# Patient Record
Sex: MALE | Race: WHITE | Employment: FULL TIME | URBAN - METROPOLITAN AREA
[De-identification: names, ages, dates, MRNs, and addresses within clinical notes are randomized per-mention and may not be internally consistent; named-entity substitution may affect disease eponyms.]

---

## 2017-06-12 ENCOUNTER — HOSPITAL ENCOUNTER (OUTPATIENT)
Dept: GENERAL RADIOLOGY | Age: 57
Discharge: HOME | End: 2017-06-12
Attending: PHYSICIAN ASSISTANT | Admitting: PHYSICIAN ASSISTANT

## 2017-06-12 LAB
ALBUMIN SERPL-MCNC: 4 GM/DL (ref 3.4–5)
ALP BLD-CCNC: 58 IU/L (ref 40–129)
ALT SERPL-CCNC: 21 U/L (ref 10–40)
ANION GAP SERPL CALCULATED.3IONS-SCNC: 10 MMOL/L (ref 4–16)
AST SERPL-CCNC: 22 IU/L (ref 15–37)
BASOPHILS ABSOLUTE: 0 K/CU MM
BASOPHILS RELATIVE PERCENT: 0.4 % (ref 0–1)
BILIRUB SERPL-MCNC: 0.5 MG/DL (ref 0–1)
BUN BLDV-MCNC: 19 MG/DL (ref 6–23)
CALCIUM SERPL-MCNC: 8.7 MG/DL (ref 8.3–10.6)
CHLORIDE BLD-SCNC: 105 MMOL/L (ref 99–110)
CO2: 23 MMOL/L (ref 21–32)
CREAT SERPL-MCNC: 1.1 MG/DL (ref 0.9–1.3)
D DIMER: <200 NG/ML(DDU)
DIFFERENTIAL TYPE: ABNORMAL
EOSINOPHILS ABSOLUTE: 0.4 K/CU MM
EOSINOPHILS RELATIVE PERCENT: 5.6 % (ref 0–3)
GFR AFRICAN AMERICAN: >60 ML/MIN/1.73M2
GFR NON-AFRICAN AMERICAN: >60 ML/MIN/1.73M2
GLUCOSE BLD-MCNC: 84 MG/DL (ref 70–140)
HCT VFR BLD CALC: 37.5 % (ref 42–52)
HEMOGLOBIN: 12.5 GM/DL (ref 13.5–18)
IMMATURE NEUTROPHIL %: 0.5 % (ref 0–0.43)
LYMPHOCYTES ABSOLUTE: 2.2 K/CU MM
LYMPHOCYTES RELATIVE PERCENT: 28.8 % (ref 24–44)
MCH RBC QN AUTO: 29.8 PG (ref 27–31)
MCHC RBC AUTO-ENTMCNC: 33.3 % (ref 32–36)
MCV RBC AUTO: 89.5 FL (ref 78–100)
MONOCYTES ABSOLUTE: 0.6 K/CU MM
MONOCYTES RELATIVE PERCENT: 7.8 % (ref 0–4)
NUCLEATED RBC %: 0 %
PDW BLD-RTO: 12.6 % (ref 11.7–14.9)
PLATELET # BLD: 139 K/CU MM (ref 140–440)
PMV BLD AUTO: 11.5 FL (ref 7.5–11.1)
POTASSIUM SERPL-SCNC: 4.3 MMOL/L (ref 3.5–5.1)
RBC # BLD: 4.19 M/CU MM (ref 4.6–6.2)
SEGMENTED NEUTROPHILS ABSOLUTE COUNT: 4.4 K/CU MM
SEGMENTED NEUTROPHILS RELATIVE PERCENT: 56.9 % (ref 36–66)
SODIUM BLD-SCNC: 138 MMOL/L (ref 135–145)
TOTAL IMMATURE NEUTOROPHIL: 0.04 K/CU MM
TOTAL NUCLEATED RBC: 0 K/CU MM
TOTAL PROTEIN: 6.1 GM/DL (ref 6.4–8.2)
TSH HIGH SENSITIVITY: 2.09 UIU/ML (ref 0.27–4.2)
WBC # BLD: 7.7 K/CU MM (ref 4–10.5)

## 2017-06-20 ENCOUNTER — HOSPITAL ENCOUNTER (OUTPATIENT)
Dept: GENERAL RADIOLOGY | Age: 57
Discharge: HOME | End: 2017-06-20
Attending: PHYSICIAN ASSISTANT | Admitting: PHYSICIAN ASSISTANT

## 2017-06-20 DIAGNOSIS — R06.02 SHORTNESS OF BREATH: ICD-10-CM

## 2017-07-05 ENCOUNTER — HOSPITAL ENCOUNTER (OUTPATIENT)
Dept: GENERAL RADIOLOGY | Age: 57
Discharge: HOME | End: 2017-07-05
Attending: PHYSICIAN ASSISTANT | Admitting: PHYSICIAN ASSISTANT

## 2017-07-05 DIAGNOSIS — R06.02 SHORTNESS OF BREATH: ICD-10-CM

## 2017-07-20 ENCOUNTER — HOSPITAL ENCOUNTER (OUTPATIENT)
Dept: CARDIOLOGY | Age: 57
Discharge: HOME | End: 2017-07-20
Attending: PHYSICIAN ASSISTANT | Admitting: PHYSICIAN ASSISTANT

## 2017-07-20 DIAGNOSIS — R07.9 CHEST PAIN, UNSPECIFIED: ICD-10-CM

## 2017-07-20 DIAGNOSIS — R06.09 OTHER FORMS OF DYSPNEA: ICD-10-CM

## 2017-07-20 LAB
LV EF: 49 %
LVEF MODALITY: NORMAL

## 2017-07-20 RX ORDER — SODIUM CHLORIDE 0.9 % (FLUSH) 0.9 %
10 SYRINGE (ML) INJECTION PRN
Status: DISCONTINUED | OUTPATIENT
Start: 2017-07-20 | End: 2017-07-21 | Stop reason: HOSPADM

## 2017-07-20 RX ADMIN — Medication 10 ML: at 11:21

## 2017-07-20 RX ADMIN — Medication 30 MILLICURIE: at 12:04

## 2017-07-20 RX ADMIN — Medication 10 MILLICURIE: at 12:04

## 2017-08-04 ENCOUNTER — HOSPITAL ENCOUNTER (OUTPATIENT)
Dept: GENERAL RADIOLOGY | Age: 57
Discharge: HOME | End: 2017-08-04
Attending: INTERNAL MEDICINE | Admitting: INTERNAL MEDICINE

## 2017-08-04 LAB
ANION GAP SERPL CALCULATED.3IONS-SCNC: 12 MMOL/L (ref 4–16)
APTT: 26 SECONDS (ref 21.2–33)
BASOPHILS ABSOLUTE: 0 K/CU MM
BASOPHILS RELATIVE PERCENT: 0.5 % (ref 0–1)
BUN BLDV-MCNC: 20 MG/DL (ref 6–23)
CALCIUM SERPL-MCNC: 8.8 MG/DL (ref 8.3–10.6)
CHLORIDE BLD-SCNC: 105 MMOL/L (ref 99–110)
CO2: 24 MMOL/L (ref 21–32)
CREAT SERPL-MCNC: 1.1 MG/DL (ref 0.9–1.3)
DIFFERENTIAL TYPE: ABNORMAL
EOSINOPHILS ABSOLUTE: 0.4 K/CU MM
EOSINOPHILS RELATIVE PERCENT: 5.1 % (ref 0–3)
GFR AFRICAN AMERICAN: >60 ML/MIN/1.73M2
GFR NON-AFRICAN AMERICAN: >60 ML/MIN/1.73M2
GLUCOSE BLD-MCNC: 95 MG/DL (ref 70–140)
HCT VFR BLD CALC: 39.9 % (ref 42–52)
HEMOGLOBIN: 13.6 GM/DL (ref 13.5–18)
IMMATURE NEUTROPHIL %: 0.7 % (ref 0–0.43)
INR BLD: 0.95 INDEX
LYMPHOCYTES ABSOLUTE: 3.1 K/CU MM
LYMPHOCYTES RELATIVE PERCENT: 40.7 % (ref 24–44)
MCH RBC QN AUTO: 30.4 PG (ref 27–31)
MCHC RBC AUTO-ENTMCNC: 34.1 % (ref 32–36)
MCV RBC AUTO: 89.1 FL (ref 78–100)
MONOCYTES ABSOLUTE: 0.6 K/CU MM
MONOCYTES RELATIVE PERCENT: 8.4 % (ref 0–4)
NUCLEATED RBC %: 0 %
PDW BLD-RTO: 11.8 % (ref 11.7–14.9)
PLATELET # BLD: 157 K/CU MM (ref 140–440)
PMV BLD AUTO: 12.6 FL (ref 7.5–11.1)
POTASSIUM SERPL-SCNC: 4.6 MMOL/L (ref 3.5–5.1)
PROTHROMBIN TIME: 10.8 SECONDS (ref 9.12–12.5)
RBC # BLD: 4.48 M/CU MM (ref 4.6–6.2)
SEGMENTED NEUTROPHILS ABSOLUTE COUNT: 3.4 K/CU MM
SEGMENTED NEUTROPHILS RELATIVE PERCENT: 44.6 % (ref 36–66)
SODIUM BLD-SCNC: 141 MMOL/L (ref 135–145)
TOTAL IMMATURE NEUTOROPHIL: 0.05 K/CU MM
TOTAL NUCLEATED RBC: 0 K/CU MM
WBC # BLD: 7.6 K/CU MM (ref 4–10.5)

## 2017-09-08 ENCOUNTER — HOSPITAL ENCOUNTER (OUTPATIENT)
Dept: SLEEP CENTER | Age: 57
Discharge: HOME | End: 2017-09-08
Attending: FAMILY MEDICINE | Admitting: FAMILY MEDICINE

## 2017-09-23 ENCOUNTER — HOSPITAL ENCOUNTER (OUTPATIENT)
Dept: SLEEP CENTER | Age: 57
Discharge: HOME | End: 2017-09-23
Attending: INTERNAL MEDICINE | Admitting: INTERNAL MEDICINE

## 2017-09-23 VITALS — WEIGHT: 273 LBS | HEIGHT: 74 IN | BODY MASS INDEX: 35.04 KG/M2

## 2017-09-23 RX ORDER — DOXYCYCLINE HYCLATE 100 MG/1
100 CAPSULE ORAL DAILY
COMMUNITY
End: 2019-08-30 | Stop reason: SDUPTHER

## 2017-09-23 RX ORDER — TADALAFIL 5 MG/1
5 TABLET ORAL PRN
COMMUNITY
End: 2019-08-30 | Stop reason: SDUPTHER

## 2017-09-23 RX ORDER — COLCHICINE 0.6 MG/1
0.6 TABLET ORAL DAILY PRN
Status: ON HOLD | COMMUNITY
End: 2019-12-16 | Stop reason: HOSPADM

## 2017-09-23 RX ORDER — NITROGLYCERIN 0.4 MG/1
0.4 TABLET SUBLINGUAL PRN
COMMUNITY
End: 2020-01-14

## 2017-10-13 ENCOUNTER — HOSPITAL ENCOUNTER (OUTPATIENT)
Dept: SLEEP CENTER | Age: 57
Discharge: HOME | End: 2017-10-13
Attending: INTERNAL MEDICINE | Admitting: INTERNAL MEDICINE

## 2017-10-13 VITALS — BODY MASS INDEX: 35.04 KG/M2 | WEIGHT: 273 LBS | HEIGHT: 74 IN

## 2017-10-13 ASSESSMENT — SLEEP AND FATIGUE QUESTIONNAIRES
HOW LIKELY ARE YOU TO NOD OFF OR FALL ASLEEP WHILE LYING DOWN TO REST IN THE AFTERNOON WHEN CIRCUMSTANCES PERMIT: 3
HOW LIKELY ARE YOU TO NOD OFF OR FALL ASLEEP WHILE SITTING QUIETLY AFTER LUNCH WITHOUT ALCOHOL: 2
HOW LIKELY ARE YOU TO NOD OFF OR FALL ASLEEP WHILE SITTING INACTIVE IN A PUBLIC PLACE: 2
NECK CIRCUMFERENCE (INCHES): 18
HOW LIKELY ARE YOU TO NOD OFF OR FALL ASLEEP IN A CAR, WHILE STOPPED FOR A FEW MINUTES IN TRAFFIC: 0
HOW LIKELY ARE YOU TO NOD OFF OR FALL ASLEEP WHILE SITTING AND TALKING TO SOMEONE: 0
HOW LIKELY ARE YOU TO NOD OFF OR FALL ASLEEP WHEN YOU ARE A PASSENGER IN A CAR FOR AN HOUR WITHOUT A BREAK: 1
HOW LIKELY ARE YOU TO NOD OFF OR FALL ASLEEP WHILE SITTING AND READING: 3
ESS TOTAL SCORE: 12
HOW LIKELY ARE YOU TO NOD OFF OR FALL ASLEEP WHILE WATCHING TV: 1

## 2017-10-20 NOTE — PROGRESS NOTES
10/13/2017  sleep study results for Carmen Stallings  1960 are finalized and available. Please see media tab.     Electronically signed by Brendan Craig on 10/20/2017 at 6:06 PM

## 2017-12-22 ENCOUNTER — HOSPITAL ENCOUNTER (OUTPATIENT)
Dept: SLEEP CENTER | Age: 57
Discharge: HOME | End: 2018-01-20
Attending: INTERNAL MEDICINE | Admitting: INTERNAL MEDICINE

## 2018-12-04 ENCOUNTER — HOSPITAL ENCOUNTER (OUTPATIENT)
Age: 58
Discharge: HOME | End: 2018-12-04
Payer: COMMERCIAL

## 2018-12-04 ENCOUNTER — HOSPITAL ENCOUNTER (OUTPATIENT)
Dept: GENERAL RADIOLOGY | Age: 58
Discharge: HOME | End: 2018-12-04
Payer: COMMERCIAL

## 2018-12-04 DIAGNOSIS — R06.02 SOB (SHORTNESS OF BREATH): ICD-10-CM

## 2018-12-04 PROCEDURE — 71046 X-RAY EXAM CHEST 2 VIEWS: CPT

## 2019-01-16 ENCOUNTER — HOSPITAL ENCOUNTER (OUTPATIENT)
Dept: PULMONOLOGY | Age: 59
Discharge: HOME | End: 2019-01-16
Payer: COMMERCIAL

## 2019-01-16 LAB
DLCO %PRED: 63 %
DLCO PRED: NORMAL ML/MIN/MMHG
DLCO/VA %PRED: NORMAL %
DLCO/VA PRED: NORMAL ML/MIN/MMHG
DLCO/VA: NORMAL ML/MIN/MMHG
DLCO: NORMAL ML/MIN/MMHG
EXPIRATORY TIME: NORMAL SEC
FEF 25-75% %PRED-PRE: NORMAL L/SEC
FEF 25-75% PRED: NORMAL L/SEC
FEF 25-75%-PRE: NORMAL L/SEC
FEV1 %PRED-PRE: 60 %
FEV1 PRED: NORMAL L
FEV1/FVC %PRED-PRE: NORMAL %
FEV1/FVC PRED: NORMAL %
FEV1/FVC: 73 %
FEV1: NORMAL L
FVC %PRED-PRE: NORMAL %
FVC PRED: NORMAL L
FVC: NORMAL L
GAW %PRED: NORMAL %
GAW PRED: NORMAL L/S/CMH2O
GAW: NORMAL L/S/CMH2O
IC %PRED: NORMAL %
IC PRED: NORMAL L
IC: NORMAL L
MVV %PRED-PRE: NORMAL %
MVV PRED: NORMAL L/MIN
MVV-PRE: NORMAL L/MIN
PEF %PRED-PRE: NORMAL L/SEC
PEF PRED: NORMAL L/SEC
PEF-PRE: NORMAL L/SEC
RAW %PRED: NORMAL %
RAW PRED: NORMAL CMH2O/L/S
RAW: NORMAL CMH2O/L/S
RV %PRED: NORMAL %
RV PRED: NORMAL L
RV: NORMAL L
SVC %PRED: NORMAL %
SVC PRED: NORMAL L
SVC: NORMAL L
TLC %PRED: 80 %
TLC PRED: NORMAL L
TLC: NORMAL L
VA %PRED: NORMAL %
VA PRED: NORMAL L
VA: NORMAL L
VTG %PRED: NORMAL %
VTG PRED: NORMAL L
VTG: NORMAL L

## 2019-01-16 PROCEDURE — 94726 PLETHYSMOGRAPHY LUNG VOLUMES: CPT

## 2019-01-16 PROCEDURE — 94729 DIFFUSING CAPACITY: CPT

## 2019-01-16 PROCEDURE — 94060 EVALUATION OF WHEEZING: CPT

## 2019-01-16 ASSESSMENT — PULMONARY FUNCTION TESTS
FEV1_PERCENT_PREDICTED_PRE: 60
FEV1/FVC: 73

## 2019-04-24 LAB
ANION GAP SERPL CALCULATED.3IONS-SCNC: 13 MMOL/L (ref 3–16)
BASOPHILS ABSOLUTE: 0 K/UL (ref 0–0.2)
BASOPHILS RELATIVE PERCENT: 0.4 %
BUN BLDV-MCNC: 22 MG/DL (ref 7–20)
CALCIUM SERPL-MCNC: 8.6 MG/DL (ref 8.3–10.6)
CHLORIDE BLD-SCNC: 102 MMOL/L (ref 99–110)
CO2: 24 MMOL/L (ref 21–32)
CREAT SERPL-MCNC: 1.1 MG/DL (ref 0.9–1.3)
EOSINOPHILS ABSOLUTE: 0.3 K/UL (ref 0–0.6)
EOSINOPHILS RELATIVE PERCENT: 4 %
GFR AFRICAN AMERICAN: >60
GFR NON-AFRICAN AMERICAN: >60
GLUCOSE BLD-MCNC: 97 MG/DL (ref 70–99)
HCT VFR BLD CALC: 39.5 % (ref 40.5–52.5)
HEMOGLOBIN: 13.5 G/DL (ref 13.5–17.5)
LYMPHOCYTES ABSOLUTE: 2.4 K/UL (ref 1–5.1)
LYMPHOCYTES RELATIVE PERCENT: 37 %
MCH RBC QN AUTO: 30.5 PG (ref 26–34)
MCHC RBC AUTO-ENTMCNC: 34.2 G/DL (ref 31–36)
MCV RBC AUTO: 89.2 FL (ref 80–100)
MONOCYTES ABSOLUTE: 0.5 K/UL (ref 0–1.3)
MONOCYTES RELATIVE PERCENT: 8.2 %
NEUTROPHILS ABSOLUTE: 3.3 K/UL (ref 1.7–7.7)
NEUTROPHILS RELATIVE PERCENT: 50.4 %
PDW BLD-RTO: 12.8 % (ref 12.4–15.4)
PLATELET # BLD: 160 K/UL (ref 135–450)
PMV BLD AUTO: 9.3 FL (ref 5–10.5)
POTASSIUM SERPL-SCNC: 4 MMOL/L (ref 3.5–5.1)
PROSTATE SPECIFIC ANTIGEN: 0.6 NG/ML (ref 0–4)
RBC # BLD: 4.42 M/UL (ref 4.2–5.9)
SODIUM BLD-SCNC: 139 MMOL/L (ref 136–145)
TSH SERPL DL<=0.05 MIU/L-ACNC: 2.62 UIU/ML (ref 0.27–4.2)
WBC # BLD: 6.5 K/UL (ref 4–11)

## 2019-04-27 LAB — TESTOSTERONE TOTAL: 144 NG/DL (ref 220–1000)

## 2019-06-02 ENCOUNTER — HOSPITAL ENCOUNTER (EMERGENCY)
Age: 59
Discharge: HOME | End: 2019-06-02
Payer: COMMERCIAL

## 2019-06-02 VITALS
HEART RATE: 65 BPM | DIASTOLIC BLOOD PRESSURE: 74 MMHG | TEMPERATURE: 98 F | WEIGHT: 280 LBS | BODY MASS INDEX: 35.94 KG/M2 | HEIGHT: 74 IN | RESPIRATION RATE: 17 BRPM | SYSTOLIC BLOOD PRESSURE: 127 MMHG | OXYGEN SATURATION: 99 %

## 2019-06-02 DIAGNOSIS — R10.9 ABDOMINAL PAIN, UNSPECIFIED ABDOMINAL LOCATION: ICD-10-CM

## 2019-06-02 DIAGNOSIS — R31.9 HEMATURIA, UNSPECIFIED TYPE: Primary | ICD-10-CM

## 2019-06-02 LAB
ANION GAP SERPL CALCULATED.3IONS-SCNC: 9 MMOL/L (ref 4–16)
APTT: 25.2 SECONDS (ref 21.2–33)
BACTERIA: NEGATIVE /HPF
BASOPHILS ABSOLUTE: 0 K/CU MM
BASOPHILS RELATIVE PERCENT: 0.5 % (ref 0–1)
BILIRUBIN URINE: NEGATIVE MG/DL
BLOOD, URINE: ABNORMAL
BUN BLDV-MCNC: 23 MG/DL (ref 6–23)
CALCIUM SERPL-MCNC: 8.9 MG/DL (ref 8.3–10.6)
CHLORIDE BLD-SCNC: 103 MMOL/L (ref 99–110)
CLARITY: CLEAR
CO2: 25 MMOL/L (ref 21–32)
COLOR: YELLOW
CREAT SERPL-MCNC: 1.1 MG/DL (ref 0.9–1.3)
DIFFERENTIAL TYPE: ABNORMAL
EOSINOPHILS ABSOLUTE: 0.3 K/CU MM
EOSINOPHILS RELATIVE PERCENT: 4.2 % (ref 0–3)
GFR AFRICAN AMERICAN: >60 ML/MIN/1.73M2
GFR NON-AFRICAN AMERICAN: >60 ML/MIN/1.73M2
GLUCOSE BLD-MCNC: 96 MG/DL (ref 70–99)
GLUCOSE, URINE: NEGATIVE MG/DL
HCT VFR BLD CALC: 40.9 % (ref 42–52)
HEMOGLOBIN: 13.2 GM/DL (ref 13.5–18)
HYALINE CASTS: 0 /LPF
IMMATURE NEUTROPHIL %: 0.9 % (ref 0–0.43)
INR BLD: 0.96 INDEX
KETONES, URINE: NEGATIVE MG/DL
LEUKOCYTE ESTERASE, URINE: NEGATIVE
LYMPHOCYTES ABSOLUTE: 2.6 K/CU MM
LYMPHOCYTES RELATIVE PERCENT: 39.8 % (ref 24–44)
MCH RBC QN AUTO: 29.1 PG (ref 27–31)
MCHC RBC AUTO-ENTMCNC: 32.3 % (ref 32–36)
MCV RBC AUTO: 90.3 FL (ref 78–100)
MONOCYTES ABSOLUTE: 0.6 K/CU MM
MONOCYTES RELATIVE PERCENT: 9.1 % (ref 0–4)
MUCUS: ABNORMAL HPF
NITRITE URINE, QUANTITATIVE: NEGATIVE
NUCLEATED RBC %: 0 %
PDW BLD-RTO: 12.1 % (ref 11.7–14.9)
PH, URINE: 5 (ref 5–8)
PLATELET # BLD: 155 K/CU MM (ref 140–440)
PMV BLD AUTO: 11.1 FL (ref 7.5–11.1)
POTASSIUM SERPL-SCNC: 4.2 MMOL/L (ref 3.5–5.1)
PROTEIN UA: NEGATIVE MG/DL
PROTHROMBIN TIME: 10.9 SECONDS (ref 9.12–12.5)
RBC # BLD: 4.53 M/CU MM (ref 4.6–6.2)
RBC URINE: 155 /HPF (ref 0–3)
SEGMENTED NEUTROPHILS ABSOLUTE COUNT: 2.9 K/CU MM
SEGMENTED NEUTROPHILS RELATIVE PERCENT: 45.5 % (ref 36–66)
SODIUM BLD-SCNC: 137 MMOL/L (ref 135–145)
SPECIFIC GRAVITY UA: 1.02 (ref 1–1.03)
SQUAMOUS EPITHELIAL: <1 /HPF
TOTAL IMMATURE NEUTOROPHIL: 0.06 K/CU MM
TOTAL NUCLEATED RBC: 0 K/CU MM
TRICHOMONAS: ABNORMAL /HPF
UROBILINOGEN, URINE: NORMAL MG/DL (ref 0.2–1)
WBC # BLD: 6.5 K/CU MM (ref 4–10.5)
WBC UA: ABNORMAL /HPF (ref 0–2)

## 2019-06-02 PROCEDURE — 99283 EMERGENCY DEPT VISIT LOW MDM: CPT

## 2019-06-02 PROCEDURE — 85610 PROTHROMBIN TIME: CPT

## 2019-06-02 PROCEDURE — 87086 URINE CULTURE/COLONY COUNT: CPT

## 2019-06-02 PROCEDURE — 80048 BASIC METABOLIC PNL TOTAL CA: CPT

## 2019-06-02 PROCEDURE — 81001 URINALYSIS AUTO W/SCOPE: CPT

## 2019-06-02 PROCEDURE — 85025 COMPLETE CBC W/AUTO DIFF WBC: CPT

## 2019-06-02 PROCEDURE — 85730 THROMBOPLASTIN TIME PARTIAL: CPT

## 2019-06-02 NOTE — ED TRIAGE NOTES
Pt reports having burning, pressure, urgency and bleeding upon urination. Reports these symptoms began today.

## 2019-06-03 NOTE — ED PROVIDER NOTES
Patient Identification  Melissa Zavala is a 66 y.o. male    Chief Complaint  Dysuria (Reports burning and bleeding upon urination)      HPI  (History provided by patient)  This is a 66 y.o. male who was brought in by self for chief complaint of dysuria and hematuria. Onset was this morning. Patient reports he's had intermittent episodes of right red blood in his urine as well as dysuria that occurs only when blood is in urine. Has episodes of normal urination in between these episodes. Reports he's had intermittent pain in his right lower abdomen for the last few days, mild. Does have a history of kidney stones. No urethral discharge, testicle pain, genital trauma, nausea or vomiting, fevers, bleeding from any other source noted. No recent antibiotics. States he had a similar problem a year ago and followed up with urology and they gave him \"medications\" and it cleared up. He does not remember what these were. He is currently on Plavix. REVIEW OF SYSTEMS    Constitutional:  Denies fever, chills  HENT:  Denies sore throat or ear pain   Eyes: Denies vision changes, eye pain  Cardiovascular:  Denies chest pain, syncope  Respiratory:  Denies shortness of breath, cough   GI:  Denies nausea, vomiting.  + RLQ pain. :  Denies discharge. + dysuria, hematuria  Musculoskeletal:  Denies back pain, joint pain  Skin:  Denies rash, pruritis  Neurologic:  Denies headache, focal weakness, or sensory changes     See HPI and nursing notes for additional information     I have reviewed the following nursing documentation:  Allergies: No Known Allergies    Past medical history:  has a past medical history of CAD (coronary artery disease), Heart disease, Hyperlipidemia, and Hypertension. Past surgical history:  has a past surgical history that includes back surgery; vascular surgery; and Coronary angioplasty with stent.     Home medications:   Prior to Admission medications    Medication Sig Start Date End Date Taking? Authorizing Provider   tadalafil (CIALIS) 5 MG tablet Take 5 mg by mouth daily    Historical Provider, MD   colchicine (COLCRYS) 0.6 MG tablet Take 0.6 mg by mouth daily    Historical Provider, MD   doxycycline hyclate (VIBRAMYCIN) 100 MG capsule Take 100 mg by mouth daily    Historical Provider, MD   nitroGLYCERIN (NITROSTAT) 0.4 MG SL tablet Place 0.4 mg under the tongue as needed for Chest pain up to max of 3 total doses. If no relief after 1 dose, call 911. Historical Provider, MD   aspirin 81 MG chewable tablet Take 1 tablet by mouth daily 8/9/17   Wilmer Cranker, MD   atorvastatin (LIPITOR) 20 MG tablet Take 1 tablet by mouth nightly 8/9/17   Wilmer Cranker, MD   clopidogrel (PLAVIX) 75 MG tablet Take 1 tablet by mouth daily 8/9/17   Wilmer Cranker, MD   HYDROcodone-acetaminophen (NORCO) 5-325 MG per tablet Take 1 tablet by mouth every 4 hours as needed for Pain . 12/18/16   Ric Gongora MD   dicyclomine (BENTYL) 10 MG capsule Take 1 capsule by mouth 4 times daily as needed 4/3/16   Daniel Enriquez MD   ondansetron [facility]) 4 MG tablet Take 1 tablet by mouth every 8 hours as needed for Nausea 4/3/16   Daniel Enriquez MD   ranitidine (ZANTAC) 150 MG tablet Take 150 mg by mouth 2 times daily. Historical Provider, MD   LISINOPRIL PO Take 20 mg by mouth daily     Historical Provider, MD   Tamsulosin HCl (FLOMAX PO) Take 0.4 mg by mouth daily     Historical Provider, MD   SIMVASTATIN PO Take 40 mg by mouth daily     Historical Provider, MD       Social history:  reports that he quit smoking about 2 years ago. His smoking use included cigarettes. He smoked 1.50 packs per day. He has never used smokeless tobacco. He reports that he drinks alcohol. He reports that he does not use drugs. Family history:  History reviewed. No pertinent family history.       Exam  /70   Pulse 58   Temp 97.9 °F (36.6 °C) (Oral)   Resp 18   Ht 6' 2\" (1.88 m)   Wt 280 lb (127 kg)   SpO2 98%   BMI 35.95 kg/m² NONE SEEN /HPF    Hyaline Casts, UA 0 /LPF   CBC Auto Differential   Result Value Ref Range    WBC 6.5 4.0 - 10.5 K/CU MM    RBC 4.53 (L) 4.6 - 6.2 M/CU MM    Hemoglobin 13.2 (L) 13.5 - 18.0 GM/DL    Hematocrit 40.9 (L) 42 - 52 %    MCV 90.3 78 - 100 FL    MCH 29.1 27 - 31 PG    MCHC 32.3 32.0 - 36.0 %    RDW 12.1 11.7 - 14.9 %    Platelets 340 377 - 052 K/CU MM    MPV 11.1 7.5 - 11.1 FL    Differential Type AUTOMATED DIFFERENTIAL     Segs Relative 45.5 36 - 66 %    Lymphocytes % 39.8 24 - 44 %    Monocytes % 9.1 (H) 0 - 4 %    Eosinophils % 4.2 (H) 0 - 3 %    Basophils % 0.5 0 - 1 %    Segs Absolute 2.9 K/CU MM    Lymphocytes # 2.6 K/CU MM    Monocytes # 0.6 K/CU MM    Eosinophils # 0.3 K/CU MM    Basophils # 0.0 K/CU MM    Nucleated RBC % 0.0 %    Total Nucleated RBC 0.0 K/CU MM    Total Immature Neutrophil 0.06 K/CU MM    Immature Neutrophil % 0.9 (H) 0 - 0.43 %   Basic Metabolic Panel   Result Value Ref Range    Sodium 137 135 - 145 MMOL/L    Potassium 4.2 3.5 - 5.1 MMOL/L    Chloride 103 99 - 110 mMol/L    CO2 25 21 - 32 MMOL/L    Anion Gap 9 4 - 16    BUN 23 6 - 23 MG/DL    CREATININE 1.1 0.9 - 1.3 MG/DL    Glucose 96 70 - 99 MG/DL    Calcium 8.9 8.3 - 10.6 MG/DL    GFR Non-African American >60 >60 mL/min/1.73m2    GFR African American >60 >60 mL/min/1.73m2   Protime/INR & PTT   Result Value Ref Range    Protime 10.9 9.12 - 12.5 SECONDS    INR 0.96 INDEX    aPTT 25.2 21.2 - 33.0 SECONDS         MDM  Patient presents for hematuria. He does have a small amount of right lower quadrant and flank tenderness. On physical exam he does not appear to have acute abdomen. His vital signs are unremarkable. Laboratory workup reveals no evidence of leukocytosis. He has a mild chronic unchanged anemia. INR and PTT are normal.  Renal function is normal.  UA shows hematuria without evidence of infection. Unclear source of symptoms, clinically he does not appear to have appendicitis.   He may have a small kidney stone he is passing that he appears in no distress currently. I have recommended he follow up with urology for evaluation of hematuria given that this is second ER visit in the past year and a half for this. Discussed that he may benefit from cystoscopy to rule out bladder carcinoma or vascular malformation. Recommended going to urology office tomorrow morning at 8 AM for follow-up appointment. If unable to follow up then and then make appointment wall at office. I estimate there is LOW risk for ACUTE APPENDICITIS, BOWEL OBSTRUCTION, CHOLECYSTITIS, DIVERTICULITIS, INCARCERATED HERNIA, PANCREATITIS, MESENTERIC ISCHEMIA, ABDOMINAL AORTIC ANEURYSM/DISSECTION, PERFORATED BOWEL, and PERFORATED ULCER, thus I consider the discharge disposition reasonable. Melissa Zvaala and I have discussed the diagnosis and risks, and we agree with discharging home with PCP and urology follow-up. We also discussed returning to the Emergency Department immediately if new or worsening symptoms occur. We have discussed the symptoms which are most concerning (e.g., bloody stool, fever, changing or worsening pain, intractable vomiting, chest pain) that necessitate immediate return. I have independently evaluated this patient. Final Impression  1. Hematuria, unspecified type    2. Abdominal pain, unspecified abdominal location        Blood pressure 138/70, pulse 58, temperature 97.9 °F (36.6 °C), temperature source Oral, resp. rate 18, height 6' 2\" (1.88 m), weight 280 lb (127 kg), SpO2 98 %. Disposition:  Discharge to home in stable condition. Patient was given scripts for the following medications. I counseled patient how to take these medications. New Prescriptions    No medications on file       This chart was generated using the [address] [place] [address] [employer] system. I created this record but it may contain dictation errors given the limitations of this technology.        [address] [facility]  06/02/19 [id number]

## 2019-06-03 NOTE — ED NOTES
Discharge instructions reviewed. All questions answered to pt satisfaction. Pt alert, oriented, and ambulatory upon discharge.      Jocelyn Lee, TAZ  06/02/19 [id number]

## 2019-06-04 LAB
CULTURE: NORMAL
Lab: NORMAL
SPECIMEN: NORMAL

## 2019-06-05 RX ORDER — MONTELUKAST SODIUM 10 MG/1
TABLET ORAL
Qty: 30 TABLET | Refills: 2 | Status: SHIPPED | OUTPATIENT
Start: 2019-06-05 | End: 2019-08-30

## 2019-06-07 ENCOUNTER — APPOINTMENT (OUTPATIENT)
Dept: CT IMAGING | Age: 59
End: 2019-06-07
Payer: COMMERCIAL

## 2019-06-07 ENCOUNTER — HOSPITAL ENCOUNTER (EMERGENCY)
Age: 59
Discharge: HOME | End: 2019-06-07
Payer: COMMERCIAL

## 2019-06-07 VITALS
OXYGEN SATURATION: 96 % | HEIGHT: 74 IN | WEIGHT: 282 LBS | TEMPERATURE: 98 F | RESPIRATION RATE: 16 BRPM | HEART RATE: 60 BPM | DIASTOLIC BLOOD PRESSURE: 75 MMHG | SYSTOLIC BLOOD PRESSURE: 119 MMHG | BODY MASS INDEX: 36.19 KG/M2

## 2019-06-07 DIAGNOSIS — R10.9 FLANK PAIN: Primary | ICD-10-CM

## 2019-06-07 LAB
ALBUMIN SERPL-MCNC: 4 GM/DL (ref 3.4–5)
ALP BLD-CCNC: 73 IU/L (ref 40–129)
ALT SERPL-CCNC: 24 U/L (ref 10–40)
ANION GAP SERPL CALCULATED.3IONS-SCNC: 10 MMOL/L (ref 4–16)
AST SERPL-CCNC: 25 IU/L (ref 15–37)
BACTERIA: NEGATIVE /HPF
BASOPHILS ABSOLUTE: 0 K/CU MM
BASOPHILS RELATIVE PERCENT: 0.5 % (ref 0–1)
BILIRUB SERPL-MCNC: 0.5 MG/DL (ref 0–1)
BILIRUBIN URINE: NEGATIVE MG/DL
BLOOD, URINE: NEGATIVE
BUN BLDV-MCNC: 19 MG/DL (ref 6–23)
CALCIUM SERPL-MCNC: 8.6 MG/DL (ref 8.3–10.6)
CHLORIDE BLD-SCNC: 102 MMOL/L (ref 99–110)
CLARITY: CLEAR
CO2: 25 MMOL/L (ref 21–32)
COLOR: YELLOW
CREAT SERPL-MCNC: 1.3 MG/DL (ref 0.9–1.3)
DIFFERENTIAL TYPE: ABNORMAL
EOSINOPHILS ABSOLUTE: 0.2 K/CU MM
EOSINOPHILS RELATIVE PERCENT: 3.5 % (ref 0–3)
GFR AFRICAN AMERICAN: >60 ML/MIN/1.73M2
GFR NON-AFRICAN AMERICAN: 57 ML/MIN/1.73M2
GLUCOSE BLD-MCNC: 135 MG/DL (ref 70–99)
GLUCOSE, URINE: NEGATIVE MG/DL
HCT VFR BLD CALC: 41.2 % (ref 42–52)
HEMOGLOBIN: 13.2 GM/DL (ref 13.5–18)
IMMATURE NEUTROPHIL %: 0.8 % (ref 0–0.43)
KETONES, URINE: NEGATIVE MG/DL
LEUKOCYTE ESTERASE, URINE: NEGATIVE
LIPASE: 34 IU/L (ref 13–60)
LYMPHOCYTES ABSOLUTE: 2.4 K/CU MM
LYMPHOCYTES RELATIVE PERCENT: 37.1 % (ref 24–44)
MCH RBC QN AUTO: 29.2 PG (ref 27–31)
MCHC RBC AUTO-ENTMCNC: 32 % (ref 32–36)
MCV RBC AUTO: 91.2 FL (ref 78–100)
MONOCYTES ABSOLUTE: 0.6 K/CU MM
MONOCYTES RELATIVE PERCENT: 8.7 % (ref 0–4)
MUCUS: ABNORMAL HPF
NITRITE URINE, QUANTITATIVE: NEGATIVE
NUCLEATED RBC %: 0 %
PDW BLD-RTO: 11.9 % (ref 11.7–14.9)
PH, URINE: 5 (ref 5–8)
PLATELET # BLD: 156 K/CU MM (ref 140–440)
PMV BLD AUTO: 10.7 FL (ref 7.5–11.1)
POTASSIUM SERPL-SCNC: 4 MMOL/L (ref 3.5–5.1)
PROTEIN UA: NEGATIVE MG/DL
RBC # BLD: 4.52 M/CU MM (ref 4.6–6.2)
RBC URINE: ABNORMAL /HPF (ref 0–3)
SEGMENTED NEUTROPHILS ABSOLUTE COUNT: 3.2 K/CU MM
SEGMENTED NEUTROPHILS RELATIVE PERCENT: 49.4 % (ref 36–66)
SODIUM BLD-SCNC: 137 MMOL/L (ref 135–145)
SPECIFIC GRAVITY UA: 1.02 (ref 1–1.03)
SQUAMOUS EPITHELIAL: <1 /HPF
TOTAL IMMATURE NEUTOROPHIL: 0.05 K/CU MM
TOTAL NUCLEATED RBC: 0 K/CU MM
TOTAL PROTEIN: 6.7 GM/DL (ref 6.4–8.2)
TRICHOMONAS: ABNORMAL /HPF
UROBILINOGEN, URINE: NORMAL MG/DL (ref 0.2–1)
WBC # BLD: 6.5 K/CU MM (ref 4–10.5)
WBC UA: <1 /HPF (ref 0–2)

## 2019-06-07 PROCEDURE — 81001 URINALYSIS AUTO W/SCOPE: CPT

## 2019-06-07 PROCEDURE — 74176 CT ABD & PELVIS W/O CONTRAST: CPT

## 2019-06-07 PROCEDURE — 80053 COMPREHEN METABOLIC PANEL: CPT

## 2019-06-07 PROCEDURE — 36415 COLL VENOUS BLD VENIPUNCTURE: CPT

## 2019-06-07 PROCEDURE — 85025 COMPLETE CBC W/AUTO DIFF WBC: CPT

## 2019-06-07 PROCEDURE — 6370000000 HC RX 637 (ALT 250 FOR IP): Performed by: PHYSICIAN ASSISTANT

## 2019-06-07 PROCEDURE — 99284 EMERGENCY DEPT VISIT MOD MDM: CPT

## 2019-06-07 PROCEDURE — 83690 ASSAY OF LIPASE: CPT

## 2019-06-07 RX ORDER — HYDROCODONE BITARTRATE AND ACETAMINOPHEN 5; 325 MG/1; MG/1
2 TABLET ORAL ONCE
Status: COMPLETED | OUTPATIENT
Start: 2019-06-07 | End: 2019-06-07

## 2019-06-07 RX ORDER — HYDROCODONE BITARTRATE AND ACETAMINOPHEN 5; 325 MG/1; MG/1
1 TABLET ORAL EVERY 6 HOURS PRN
Qty: 10 TABLET | Refills: 0 | Status: SHIPPED | OUTPATIENT
Start: 2019-06-07 | End: 2019-06-10

## 2019-06-07 RX ORDER — ONDANSETRON 4 MG/1
4 TABLET, ORALLY DISINTEGRATING ORAL EVERY 6 HOURS
Qty: 10 TABLET | Refills: 0 | Status: SHIPPED | OUTPATIENT
Start: 2019-06-07 | End: 2019-08-30

## 2019-06-07 RX ADMIN — HYDROCODONE BITARTRATE AND ACETAMINOPHEN 2 TABLET: 5; 325 TABLET ORAL at 21:14

## 2019-06-07 ASSESSMENT — PAIN SCALES - GENERAL
PAINLEVEL_OUTOF10: 8
PAINLEVEL_OUTOF10: 9

## 2019-06-07 ASSESSMENT — PAIN DESCRIPTION - DESCRIPTORS: DESCRIPTORS: DULL;ACHING

## 2019-06-07 ASSESSMENT — PAIN DESCRIPTION - PAIN TYPE: TYPE: ACUTE PAIN

## 2019-06-07 ASSESSMENT — PAIN DESCRIPTION - ORIENTATION: ORIENTATION: RIGHT

## 2019-06-07 ASSESSMENT — PAIN DESCRIPTION - LOCATION: LOCATION: ABDOMEN;FLANK

## 2019-06-08 NOTE — ED PROVIDER NOTES
eMERGENCY dEPARTMENT eNCOUnter      PCP: Camilo Harper MD    [facility]    Chief Complaint   Patient presents with    Flank Pain     right sided flank pain x 1 week; pt here on sunday due to blood in his urine; pt reports the urinary symptoms have resolved    Abdominal Pain     right lower back pain x 1 week; denies nausea or vomiting      Pt seen independently and was not evaluated personally by attending physician. HPI    Ja Sawant is a 66 y.o. male who presents with 1 week of right flank and back pain. Patient was seen on the second for dysuria hematuria. Did have blood in his urine but remaining workup was unremarkable. Patient does have a history of kidney stones and he did follow up with Dr. Jose Gamboa after this ED visit. Scheduled for a CT scan next week and a cystoscopy the following week. Patient reports that since previous ED visit he has had pain in his generalized lower abdomen and right flank and right back. He did not have any of the pain at previous ED visits. He does report that the dysuria hematuria has resolved. No fevers nausea or vomiting or other constitutional signs. No testicular symptoms.         REVIEW OF SYSTEMS    Constitutional:  Denies fever, chills, weight loss or weakness   HENT:  Denies sore throat or ear pain   Cardiovascular:  Denies chest pain, palpitations   Respiratory:  Denies cough or shortness of breath    GI:  See hpi  :  Denies any urinary symptoms   Musculoskeletal:  Right back pain,   Skin:  Denies rash  Neurologic:  Denies headache, focal weakness or sensory changes   Endocrine:  Denies polyuria or polydypsia   Lymphatic:  Denies swollen glands     All other review of systems are negative  See HPI and nursing notes for additional information     PAST MEDICAL AND SURGICAL HISTORY    Past Medical History:   Diagnosis Date    CAD (coronary artery disease)     Heart disease     Hyperlipidemia     Hypertension      Past Surgical History: Procedure Laterality Date    BACK SURGERY      CORONARY ANGIOPLASTY WITH STENT PLACEMENT      VASCULAR SURGERY         CURRENT MEDICATIONS    Current Outpatient Rx   Medication Sig Dispense Refill    montelukast (SINGULAIR) 10 MG tablet TAKE 1 TABLET BY MOUTH EVERY DAY 30 tablet 2    tadalafil (CIALIS) 5 MG tablet Take 5 mg by mouth daily      colchicine (COLCRYS) 0.6 MG tablet Take 0.6 mg by mouth daily      doxycycline hyclate (VIBRAMYCIN) 100 MG capsule Take 100 mg by mouth daily      nitroGLYCERIN (NITROSTAT) 0.4 MG SL tablet Place 0.4 mg under the tongue as needed for Chest pain up to max of 3 total doses. If no relief after 1 dose, call 911.  aspirin 81 MG chewable tablet Take 1 tablet by mouth daily 30 tablet 3    atorvastatin (LIPITOR) 20 MG tablet Take 1 tablet by mouth nightly 30 tablet 3    clopidogrel (PLAVIX) 75 MG tablet Take 1 tablet by mouth daily 30 tablet 3    HYDROcodone-acetaminophen (NORCO) 5-325 MG per tablet Take 1 tablet by mouth every 4 hours as needed for Pain . 10 tablet 0    dicyclomine (BENTYL) 10 MG capsule Take 1 capsule by mouth 4 times daily as needed 20 capsule 0    ondansetron (ZOFRAN) 4 MG tablet Take 1 tablet by mouth every 8 hours as needed for Nausea 10 tablet 0    ranitidine (ZANTAC) 150 MG tablet Take 150 mg by mouth 2 times daily.         LISINOPRIL PO Take 20 mg by mouth daily       Tamsulosin HCl (FLOMAX PO) Take 0.4 mg by mouth daily       SIMVASTATIN PO Take 40 mg by mouth daily          ALLERGIES    No Known Allergies    SOCIAL AND FAMILY HISTORY    Social History     Socioeconomic History    Marital status:      Spouse name: None    Number of children: None    Years of education: None    Highest education level: None   Occupational History    None   Social Needs    Financial resource strain: None    Food insecurity:     Worry: None     Inability: None    Transportation needs:     Medical: None     Non-medical: None   Tobacco Use  Smoking status: Former Smoker     Packs/day: 1.50     Types: Cigarettes     Last attempt to quit: 2017     Years since quittin.4    Smokeless tobacco: Never Used   Substance and Sexual Activity    Alcohol use: Yes     Comment: occ    Drug use: No    Sexual activity: None   Lifestyle    Physical activity:     Days per week: None     Minutes per session: None    Stress: None   Relationships    Social connections:     Talks on phone: None     Gets together: None     Attends Yazidi service: None     Active member of club or organization: None     Attends meetings of clubs or organizations: None     Relationship status: None    Intimate partner violence:     Fear of current or ex partner: None     Emotionally abused: None     Physically abused: None     Forced sexual activity: None   Other Topics Concern    None   Social History Narrative    None     History reviewed. No pertinent family history. PHYSICAL EXAM    VITAL SIGNS: /75   Pulse 60   Temp 98 °F (36.7 °C) (Oral)   Resp 16   Ht 6' 2\" (1.88 m)   Wt 282 lb (127.9 kg)   SpO2 96%   BMI 36.21 kg/m²    Constitutional:  Well developed, Well nourished  HENT:  Normocephalic, Atraumatic, PERRL. EOMI. Sclera clear. Conjunctiva normal, No discharge. Neck/Lymphatics: supple, no JVD, no swollen nodes  Cardiovascular:  Normal heart rate, Normal rhythm, No murmurs  Respiratory:  Nonlabored breathing. Normal breath sounds, No wheezing  Abdomen: Bowel sounds normal, Soft, No masses  Generalized lower abdominal tenderness without any focal or peritoneal findings  Right CVA tenderness, right flank tenderness to palpation  Musculoskeletal: No edema, No tenderness, No cyanosis  Integument:  Warm, Dry  Neurologic:  Alert & oriented , No focal deficits noted. Cranial nerves II through XII grossly intact. Finger to nose intact, rapid alternating movements intact.   Normal gross motor coordination & motor strength bilateral upper and lower extremities, lower extremity DTRs intact. Sensation intact.   Psychiatric:  Affect normal, Mood normal.       Labs:  Results for orders placed or performed during the hospital encounter of 06/07/19   CBC auto diff   Result Value Ref Range    WBC 6.5 4.0 - 10.5 K/CU MM    RBC 4.52 (L) 4.6 - 6.2 M/CU MM    Hemoglobin 13.2 (L) 13.5 - 18.0 GM/DL    Hematocrit 41.2 (L) 42 - 52 %    MCV 91.2 78 - 100 FL    MCH 29.2 27 - 31 PG    MCHC 32.0 32.0 - 36.0 %    RDW 11.9 11.7 - 14.9 %    Platelets 217 648 - 820 K/CU MM    MPV 10.7 7.5 - 11.1 FL    Differential Type AUTOMATED DIFFERENTIAL     Segs Relative 49.4 36 - 66 %    Lymphocytes % 37.1 24 - 44 %    Monocytes % 8.7 (H) 0 - 4 %    Eosinophils % 3.5 (H) 0 - 3 %    Basophils % 0.5 0 - 1 %    Segs Absolute 3.2 K/CU MM    Lymphocytes # 2.4 K/CU MM    Monocytes # 0.6 K/CU MM    Eosinophils # 0.2 K/CU MM    Basophils # 0.0 K/CU MM    Nucleated RBC % 0.0 %    Total Nucleated RBC 0.0 K/CU MM    Total Immature Neutrophil 0.05 K/CU MM    Immature Neutrophil % 0.8 (H) 0 - 0.43 %   CMP   Result Value Ref Range    Sodium 137 135 - 145 MMOL/L    Potassium 4.0 3.5 - 5.1 MMOL/L    Chloride 102 99 - 110 mMol/L    CO2 25 21 - 32 MMOL/L    BUN 19 6 - 23 MG/DL    CREATININE 1.3 0.9 - 1.3 MG/DL    Glucose 135 (H) 70 - 99 MG/DL    Calcium 8.6 8.3 - 10.6 MG/DL    Alb 4.0 3.4 - 5.0 GM/DL    Total Protein 6.7 6.4 - 8.2 GM/DL    Total Bilirubin 0.5 0.0 - 1.0 MG/DL    ALT 24 10 - 40 U/L    AST 25 15 - 37 IU/L    Alkaline Phosphatase 73 40 - 129 IU/L    GFR Non- 57 (L) >60 mL/min/1.73m2    GFR African American >60 >60 mL/min/1.73m2    Anion Gap 10 4 - 16   Urinalysis   Result Value Ref Range    Color, UA YELLOW YELLOW    Clarity, UA CLEAR CLEAR    Glucose, Urine NEGATIVE NEGATIVE MG/DL    Bilirubin Urine NEGATIVE NEGATIVE MG/DL    Ketones, Urine NEGATIVE NEGATIVE MG/DL    Specific Gravity, UA 1.016 1.001 - 1.035    Blood, Urine NEGATIVE NEGATIVE    pH, Urine 5.0 5.0 - 8.0    Protein, UA NEGATIVE NEGATIVE MG/DL    Urobilinogen, Urine NORMAL 0.2 - 1.0 MG/DL    Nitrite Urine, Quantitative NEGATIVE NEGATIVE    Leukocyte Esterase, Urine NEGATIVE NEGATIVE    RBC, UA NONE SEEN 0 - 3 /HPF    WBC, UA <1 0 - 2 /HPF    Bacteria, UA NEGATIVE NEGATIVE /HPF    Squam Epithel, UA <1 /HPF    Mucus, UA RARE (A) NEGATIVE HPF    Trichomonas, UA NONE SEEN NONE SEEN /HPF   Lipase   Result Value Ref Range    Lipase 34 13 - 60 IU/L       RADIOLOGY         CT ABDOMEN PELVIS WO CONTRAST Additional Contrast? None (Final result)   Result time 06/07/19 21:57:44   Final result by Andi Null MD (06/07/19 21:57:44)                Impression:    1. No nephrolithiasis or obstructive uropathy. 2. No other acute findings in the abdomen or pelvis. Narrative:    EXAMINATION:  CT OF THE ABDOMEN AND PELVIS WITHOUT CONTRAST 6/7/2019 9:31 pm    TECHNIQUE:  CT of the abdomen and pelvis was performed without the administration of  intravenous contrast. Multiplanar reformatted images are provided for review. Dose modulation, iterative reconstruction, and/or weight based adjustment of  the mA/kV was utilized to reduce the radiation dose to as low as reasonably  achievable. COMPARISON:  04/14/2018 CT    HISTORY:  ORDERING SYSTEM PROVIDED HISTORY: right flank and back pain  TECHNOLOGIST PROVIDED HISTORY:  Additional Contrast?->None  Ordering Physician Provided Reason for Exam: rt flank pain    FINDINGS:   System: The kidneys are normal in size.  There is no evidence of  nephrolithiasis or obstructive uropathy. The ureters taper normally.  The  bladder is normal.    Lower Chest:  The lung bases are clear.  The base of the heart is normal.    Organs: Gallbladder is absent.  The liver, biliary ducts, pancreas and spleen  are normal.  The adrenal are normal.    GI/Bowel:  The stomach, duodenum and small bowel are normal. The appendix is  absent.  The colon is normal.    Pelvis: Prostate is normal.    Peritoneum/Retroperitoneum: The aorta tapers normally.  No lymph node  enlargement. Bones/Soft Tissues: Posterior fusion L4-S1.  No evidence of complication. Additional degenerative changes are seen throughout the spine.  No acute  skeletal findings.  Remote displaced fractures of the right 9th through 11th  ribs are stable. ED COURSE & MEDICAL DECISION MAKING       Vital signs and nursing notes reviewed during ED course. Patient seen independently. Attending available throughout ed stay for consultation. All pertinent Lab data and radiographic results reviewed with patient at bedside. The patient and/or the family were informed of the results of any tests/labs/imaging, the treatment plan, and time was allotted to answer questions. Clinical  IMPRESSION    1. Flank pain      Pt presents as above. Has been following with urology for back and flank pain but new pain radiating into the abdomen. Has outpatient ct and cystoscopy scheduled and would like to obtain ct today. Given pain meds and on recheck feeling better. Discussed all results ith patient at this time and encouraged to call urology office to move up cystoscopy and appointment if possible now that ct complete. To return here for new or worsening symptoms. Pt verbalized understanding and agreement with the POC. Comment: Please note this report has been produced using speech recognition software and may contain errors related to that system including errors in grammar, punctuation, and spelling, as well as words and phrases that may be inappropriate. If there are any questions or concerns please feel free to contact the dictating provider for clarification.         Sakina Rubio PA-C  06/10/19 [id number]

## 2019-06-08 NOTE — ED NOTES
Patient discharged to home at this time. Discharge instructions and follow up care discussed, patient voices understanding.       Simuel Hamman, RN  06/07/19 [id number]

## 2019-06-17 RX ORDER — LORATADINE 10 MG/1
TABLET ORAL
Qty: 90 TABLET | Refills: 1 | Status: SHIPPED | OUTPATIENT
Start: 2019-06-17 | End: 2019-08-30 | Stop reason: SDUPTHER

## 2019-07-27 DIAGNOSIS — K21.9 GASTROESOPHAGEAL REFLUX DISEASE, ESOPHAGITIS PRESENCE NOT SPECIFIED: ICD-10-CM

## 2019-07-27 DIAGNOSIS — I25.10 CORONARY ARTERIOSCLEROSIS: ICD-10-CM

## 2019-07-27 DIAGNOSIS — N40.0 BENIGN PROSTATIC HYPERPLASIA, UNSPECIFIED WHETHER LOWER URINARY TRACT SYMPTOMS PRESENT: ICD-10-CM

## 2019-07-27 DIAGNOSIS — N52.9 IMPOTENCE: ICD-10-CM

## 2019-07-27 RX ORDER — TRIAMTERENE AND HYDROCHLOROTHIAZIDE 37.5; 25 MG/1; MG/1
1 TABLET ORAL DAILY
COMMUNITY
Start: 2019-04-21 | End: 2019-08-30 | Stop reason: SDUPTHER

## 2019-07-27 RX ORDER — ALLOPURINOL 100 MG/1
1 TABLET ORAL DAILY
COMMUNITY
Start: 2019-04-24 | End: 2019-08-30 | Stop reason: SDUPTHER

## 2019-07-27 RX ORDER — NAPROXEN 500 MG/1
1 TABLET ORAL 2 TIMES DAILY PRN
COMMUNITY
Start: 2019-04-24 | End: 2019-08-30 | Stop reason: SDUPTHER

## 2019-08-30 ENCOUNTER — OFFICE VISIT (OUTPATIENT)
Dept: FAMILY MEDICINE CLINIC | Age: 59
End: 2019-08-30
Payer: COMMERCIAL

## 2019-08-30 VITALS
HEIGHT: 71 IN | WEIGHT: 281 LBS | DIASTOLIC BLOOD PRESSURE: 86 MMHG | BODY MASS INDEX: 39.34 KG/M2 | SYSTOLIC BLOOD PRESSURE: 136 MMHG | HEART RATE: 84 BPM

## 2019-08-30 DIAGNOSIS — I25.10 CORONARY ARTERIOSCLEROSIS: Primary | ICD-10-CM

## 2019-08-30 DIAGNOSIS — E78.00 PURE HYPERCHOLESTEROLEMIA: ICD-10-CM

## 2019-08-30 DIAGNOSIS — I10 ESSENTIAL HYPERTENSION: ICD-10-CM

## 2019-08-30 DIAGNOSIS — N52.9 IMPOTENCE: ICD-10-CM

## 2019-08-30 PROBLEM — E78.5 HYPERLIPIDEMIA: Status: ACTIVE | Noted: 2019-08-30

## 2019-08-30 PROCEDURE — 99214 OFFICE O/P EST MOD 30 MIN: CPT | Performed by: FAMILY MEDICINE

## 2019-08-30 RX ORDER — DOXYCYCLINE HYCLATE 100 MG/1
100 CAPSULE ORAL DAILY
Qty: 90 CAPSULE | Refills: 1 | Status: SHIPPED | OUTPATIENT
Start: 2019-08-30 | End: 2020-02-26 | Stop reason: SDUPTHER

## 2019-08-30 RX ORDER — NAPROXEN 500 MG/1
500 TABLET ORAL 2 TIMES DAILY PRN
Qty: 180 TABLET | Refills: 1 | Status: ON HOLD | OUTPATIENT
Start: 2019-08-30 | End: 2019-12-16 | Stop reason: HOSPADM

## 2019-08-30 RX ORDER — TAMSULOSIN HYDROCHLORIDE 0.4 MG/1
0.4 CAPSULE ORAL DAILY
Qty: 90 CAPSULE | Refills: 1 | Status: SHIPPED | OUTPATIENT
Start: 2019-08-30 | End: 2020-03-12 | Stop reason: SDUPTHER

## 2019-08-30 RX ORDER — ALLOPURINOL 100 MG/1
100 TABLET ORAL DAILY
Qty: 90 TABLET | Refills: 1 | Status: ON HOLD | OUTPATIENT
Start: 2019-08-30 | End: 2020-01-24 | Stop reason: HOSPADM

## 2019-08-30 RX ORDER — NITROGLYCERIN 0.4 MG/1
0.4 TABLET SUBLINGUAL EVERY 5 MIN PRN
Qty: 25 TABLET | Refills: 3 | Status: ON HOLD | OUTPATIENT
Start: 2019-08-30 | End: 2020-01-24 | Stop reason: HOSPADM

## 2019-08-30 RX ORDER — TAMSULOSIN HYDROCHLORIDE 0.4 MG/1
1 CAPSULE ORAL DAILY
COMMUNITY
Start: 2019-06-17 | End: 2019-08-30 | Stop reason: SDUPTHER

## 2019-08-30 RX ORDER — LORATADINE 10 MG/1
TABLET ORAL
Qty: 90 TABLET | Refills: 1 | Status: SHIPPED | OUTPATIENT
Start: 2019-08-30 | End: 2020-03-12 | Stop reason: SDUPTHER

## 2019-08-30 RX ORDER — TRIAMTERENE AND HYDROCHLOROTHIAZIDE 37.5; 25 MG/1; MG/1
1 TABLET ORAL DAILY
Qty: 90 TABLET | Refills: 1 | Status: ON HOLD | OUTPATIENT
Start: 2019-08-30 | End: 2020-01-24 | Stop reason: HOSPADM

## 2019-08-30 RX ORDER — TADALAFIL 5 MG/1
5 TABLET ORAL DAILY
Qty: 90 TABLET | Refills: 1 | Status: ON HOLD | OUTPATIENT
Start: 2019-08-30 | End: 2020-01-24 | Stop reason: HOSPADM

## 2019-08-30 ASSESSMENT — ENCOUNTER SYMPTOMS
SORE THROAT: 0
SHORTNESS OF BREATH: 0
RHINORRHEA: 0
DIARRHEA: 0
CONSTIPATION: 0
ABDOMINAL PAIN: 0
SINUS PRESSURE: 0
CHEST TIGHTNESS: 0
COUGH: 0

## 2019-08-30 ASSESSMENT — PATIENT HEALTH QUESTIONNAIRE - PHQ9
SUM OF ALL RESPONSES TO PHQ9 QUESTIONS 1 & 2: 0
SUM OF ALL RESPONSES TO PHQ QUESTIONS 1-9: 0
1. LITTLE INTEREST OR PLEASURE IN DOING THINGS: 0
2. FEELING DOWN, DEPRESSED OR HOPELESS: 0
SUM OF ALL RESPONSES TO PHQ QUESTIONS 1-9: 0

## 2019-08-30 NOTE — PROGRESS NOTES
8/30/2019    Newton Nuñez    Chief Complaint   Patient presents with    Medication Refill     4 MONTH ROV.  Leg Swelling     SON IS CONCERNED WITH PATIENT'S LEGS SWELLING. THEY ARE WORSE WHEN HE COMES HOME FROM WORK. PATIENT C/O HAVING LEG CRAMPS AS WELL. HPI  Nahun Lorenzo is a 64 y.o. male who presents today with follow-up on multiple medical conditions. Patient denies orthostatic symptoms. He does not check home blood pressure numbers. His blood pressure is high today. He admits to eating salty snacks. We discussed nutrition improvements to include getting rid of potato chips and starting knots    Patient notes edema to his legs especially after working standing all day. He wears boots and socks but not supportive socks. States his legs are always smaller in the morning. He does not note varicose veins. Patient denies chest pain    REVIEW OF SYMPTOMS  Review of Systems   Constitutional: Negative for chills and fever. HENT: Negative for rhinorrhea, sinus pressure and sore throat. Respiratory: Negative for cough, chest tightness and shortness of breath. Gastrointestinal: Negative for abdominal pain, constipation and diarrhea. Genitourinary: Negative for dysuria and frequency. Musculoskeletal: Negative for myalgias.        PAST MEDICAL HISTORY  Past Medical History:   Diagnosis Date    BPH (benign prostatic hyperplasia) 7/27/2019    Coronary arteriosclerosis 7/27/2019    GERD (gastroesophageal reflux disease) 7/27/2019    Impotence 7/27/2019       FAMILY HISTORY  Family History   Problem Relation Age of Onset    Stroke Mother     Hypertension Mother     Coronary Art Dis Other 64        uncle       SOCIAL HISTORY  Social History     Socioeconomic History    Marital status:      Spouse name: Not on file    Number of children: Not on file    Years of education: Not on file    Highest education level: Not on file   Occupational History    Not on file   Social Needs MG tablet Take 1 tablet by mouth daily 90 tablet 1    doxycycline hyclate (VIBRAMYCIN) 100 MG capsule Take 1 capsule by mouth daily 90 capsule 1    naproxen (NAPROSYN) 500 MG tablet Take 1 tablet by mouth 2 times daily as needed for Pain 180 tablet 1    allopurinol (ZYLOPRIM) 100 MG tablet Take 1 tablet by mouth daily 90 tablet 1    nitroGLYCERIN (NITROSTAT) 0.4 MG SL tablet Place 1 tablet under the tongue every 5 minutes as needed for Chest pain up to max of 3 total doses. If no relief after 1 dose, call 911. 25 tablet 3    colchicine (COLCRYS) 0.6 MG tablet Take 0.6 mg by mouth daily as needed       nitroGLYCERIN (NITROSTAT) 0.4 MG SL tablet Place 0.4 mg under the tongue as needed for Chest pain up to max of 3 total doses. If no relief after 1 dose, call 911.  aspirin 81 MG chewable tablet Take 1 tablet by mouth daily 30 tablet 3    atorvastatin (LIPITOR) 20 MG tablet Take 1 tablet by mouth nightly 30 tablet 3    clopidogrel (PLAVIX) 75 MG tablet Take 1 tablet by mouth daily 30 tablet 3    ranitidine (ZANTAC) 150 MG tablet Take 150 mg by mouth 2 times daily. No current facility-administered medications for this visit. ALLERGIES  Allergies   Allergen Reactions    Chantix [Varenicline Tartrate] Other (See Comments)     depression    Lisinopril Other (See Comments)     cough    Testosterone Other (See Comments)     speeding    Wellbutrin [Bupropion]        PHYSICAL EXAM  /86 (Site: Left Upper Arm, Position: Sitting)   Pulse 84   Ht 5' 11\" (1.803 m)   Wt 281 lb (127.5 kg)   BMI 39.19 kg/m²     Physical Exam   Constitutional: He is oriented to person, place, and time. He appears well-developed. HENT:   Head: Normocephalic. Eyes: Conjunctivae and EOM are normal.   Neck: Neck supple. Cardiovascular: Normal rate, regular rhythm and normal heart sounds. Pulmonary/Chest: Effort normal and breath sounds normal.   Musculoskeletal: Normal range of motion.    Neurological: He is

## 2019-09-23 ENCOUNTER — TELEPHONE (OUTPATIENT)
Dept: FAMILY MEDICINE CLINIC | Age: 59
End: 2019-09-23

## 2019-09-23 RX ORDER — MONTELUKAST SODIUM 10 MG/1
10 TABLET ORAL NIGHTLY
Qty: 90 TABLET | Refills: 1 | Status: SHIPPED | OUTPATIENT
Start: 2019-09-23 | End: 2020-03-12 | Stop reason: SDUPTHER

## 2019-11-04 LAB
LV EF: 57 %
LVEF MODALITY: NORMAL

## 2019-12-06 PROBLEM — I71.20 THORACIC AORTIC ANEURYSM WITHOUT RUPTURE: Status: ACTIVE | Noted: 2019-12-06

## 2019-12-06 PROBLEM — J44.9 COPD (CHRONIC OBSTRUCTIVE PULMONARY DISEASE) (HCC): Status: ACTIVE | Noted: 2019-12-06

## 2019-12-06 PROBLEM — I35.9 AORTIC VALVE DISEASE: Status: ACTIVE | Noted: 2019-12-06

## 2019-12-09 ENCOUNTER — HOSPITAL ENCOUNTER (OUTPATIENT)
Age: 59
Discharge: HOME | End: 2019-12-09
Payer: COMMERCIAL

## 2019-12-09 LAB
ANION GAP SERPL CALCULATED.3IONS-SCNC: 11 MMOL/L (ref 4–16)
APTT: 26.3 SECONDS (ref 25.1–37.1)
BASOPHILS ABSOLUTE: 0 K/CU MM
BASOPHILS RELATIVE PERCENT: 0.4 % (ref 0–1)
BUN BLDV-MCNC: 19 MG/DL (ref 6–23)
CALCIUM SERPL-MCNC: 9 MG/DL (ref 8.3–10.6)
CHLORIDE BLD-SCNC: 105 MMOL/L (ref 99–110)
CO2: 24 MMOL/L (ref 21–32)
CREAT SERPL-MCNC: 1.1 MG/DL (ref 0.9–1.3)
DIFFERENTIAL TYPE: ABNORMAL
EOSINOPHILS ABSOLUTE: 0.3 K/CU MM
EOSINOPHILS RELATIVE PERCENT: 3.3 % (ref 0–3)
GFR AFRICAN AMERICAN: >60 ML/MIN/1.73M2
GFR NON-AFRICAN AMERICAN: >60 ML/MIN/1.73M2
GLUCOSE BLD-MCNC: 107 MG/DL (ref 70–99)
HCT VFR BLD CALC: 42.4 % (ref 42–52)
HEMOGLOBIN: 13.9 GM/DL (ref 13.5–18)
IMMATURE NEUTROPHIL %: 0.6 % (ref 0–0.43)
INR BLD: 0.92 INDEX
LYMPHOCYTES ABSOLUTE: 2.8 K/CU MM
LYMPHOCYTES RELATIVE PERCENT: 35.1 % (ref 24–44)
MCH RBC QN AUTO: 29.3 PG (ref 27–31)
MCHC RBC AUTO-ENTMCNC: 32.8 % (ref 32–36)
MCV RBC AUTO: 89.3 FL (ref 78–100)
MONOCYTES ABSOLUTE: 0.6 K/CU MM
MONOCYTES RELATIVE PERCENT: 7.9 % (ref 0–4)
NUCLEATED RBC %: 0 %
PDW BLD-RTO: 12 % (ref 11.7–14.9)
PLATELET # BLD: 166 K/CU MM (ref 140–440)
PMV BLD AUTO: 11 FL (ref 7.5–11.1)
POTASSIUM SERPL-SCNC: 3.7 MMOL/L (ref 3.5–5.1)
PROTHROMBIN TIME: 11.1 SECONDS (ref 11.7–14.5)
RBC # BLD: 4.75 M/CU MM (ref 4.6–6.2)
SEGMENTED NEUTROPHILS ABSOLUTE COUNT: 4.2 K/CU MM
SEGMENTED NEUTROPHILS RELATIVE PERCENT: 52.7 % (ref 36–66)
SODIUM BLD-SCNC: 140 MMOL/L (ref 135–145)
TOTAL IMMATURE NEUTOROPHIL: 0.05 K/CU MM
TOTAL NUCLEATED RBC: 0 K/CU MM
WBC # BLD: 8 K/CU MM (ref 4–10.5)

## 2019-12-09 PROCEDURE — 85610 PROTHROMBIN TIME: CPT

## 2019-12-09 PROCEDURE — 80048 BASIC METABOLIC PNL TOTAL CA: CPT

## 2019-12-09 PROCEDURE — 85730 THROMBOPLASTIN TIME PARTIAL: CPT

## 2019-12-09 PROCEDURE — 85025 COMPLETE CBC W/AUTO DIFF WBC: CPT

## 2019-12-09 PROCEDURE — 36415 COLL VENOUS BLD VENIPUNCTURE: CPT

## 2019-12-16 ENCOUNTER — HOSPITAL ENCOUNTER (OUTPATIENT)
Dept: CARDIAC CATH/INVASIVE PROCEDURES | Age: 59
Discharge: HOME | End: 2019-12-16
Attending: INTERNAL MEDICINE | Admitting: INTERNAL MEDICINE
Payer: COMMERCIAL

## 2019-12-16 ENCOUNTER — HOSPITAL ENCOUNTER (OUTPATIENT)
Dept: NON INVASIVE DIAGNOSTICS | Age: 59
Discharge: HOME | End: 2019-12-16
Payer: COMMERCIAL

## 2019-12-16 VITALS
OXYGEN SATURATION: 96 % | RESPIRATION RATE: 12 BRPM | SYSTOLIC BLOOD PRESSURE: 114 MMHG | BODY MASS INDEX: 35.29 KG/M2 | DIASTOLIC BLOOD PRESSURE: 81 MMHG | TEMPERATURE: 98.1 F | WEIGHT: 275 LBS | HEART RATE: 55 BPM | HEIGHT: 74 IN

## 2019-12-16 LAB
BASE EXCESS MIXED: ABNORMAL (ref 0–1.2)
CARBON MONOXIDE, BLOOD: 3 % (ref 0–5)
CO2 CONTENT: 26.7 MMOL/L (ref 19–24)
COMMENT: ABNORMAL
ESTIMATED AVERAGE GLUCOSE: 111 MG/DL
HBA1C MFR BLD: 5.5 % (ref 4.2–6.3)
HCO3 ARTERIAL: 25.4 MMOL/L (ref 18–23)
LV EF: 53 %
LVEF MODALITY: NORMAL
METHEMOGLOBIN ARTERIAL: 0.6 %
O2 SATURATION: 96.8 % (ref 96–97)
PCO2 ARTERIAL: 41 MMHG (ref 32–45)
PH BLOOD: 7.4 (ref 7.34–7.45)
PO2 ARTERIAL: 73 MMHG (ref 75–100)

## 2019-12-16 PROCEDURE — 93458 L HRT ARTERY/VENTRICLE ANGIO: CPT

## 2019-12-16 PROCEDURE — 93312 ECHO TRANSESOPHAGEAL: CPT

## 2019-12-16 PROCEDURE — C1894 INTRO/SHEATH, NON-LASER: HCPCS

## 2019-12-16 PROCEDURE — 82803 BLOOD GASES ANY COMBINATION: CPT

## 2019-12-16 PROCEDURE — 6360000004 HC RX CONTRAST MEDICATION

## 2019-12-16 PROCEDURE — 2709999900 HC NON-CHARGEABLE SUPPLY

## 2019-12-16 PROCEDURE — 7100000001 HC PACU RECOVERY - ADDTL 15 MIN

## 2019-12-16 PROCEDURE — 7100000000 HC PACU RECOVERY - FIRST 15 MIN

## 2019-12-16 PROCEDURE — 83036 HEMOGLOBIN GLYCOSYLATED A1C: CPT

## 2019-12-16 PROCEDURE — C1887 CATHETER, GUIDING: HCPCS

## 2019-12-16 PROCEDURE — C1769 GUIDE WIRE: HCPCS

## 2019-12-16 PROCEDURE — 6360000002 HC RX W HCPCS

## 2019-12-16 RX ORDER — ATROPINE SULFATE 0.4 MG/ML
0.5 AMPUL (ML) INJECTION
Status: DISCONTINUED | OUTPATIENT
Start: 2019-12-16 | End: 2019-12-16 | Stop reason: HOSPADM

## 2019-12-16 RX ORDER — SODIUM CHLORIDE 9 MG/ML
INJECTION, SOLUTION INTRAVENOUS CONTINUOUS
Status: DISCONTINUED | OUTPATIENT
Start: 2019-12-16 | End: 2019-12-16 | Stop reason: HOSPADM

## 2019-12-16 RX ORDER — DIPHENHYDRAMINE HCL 25 MG
25 TABLET ORAL ONCE
Status: DISCONTINUED | OUTPATIENT
Start: 2019-12-16 | End: 2019-12-16 | Stop reason: HOSPADM

## 2019-12-16 RX ORDER — SODIUM CHLORIDE 0.9 % (FLUSH) 0.9 %
10 SYRINGE (ML) INJECTION EVERY 12 HOURS SCHEDULED
Status: DISCONTINUED | OUTPATIENT
Start: 2019-12-16 | End: 2019-12-16 | Stop reason: HOSPADM

## 2019-12-16 RX ORDER — DIAZEPAM 5 MG/1
5 TABLET ORAL ONCE
Status: DISCONTINUED | OUTPATIENT
Start: 2019-12-16 | End: 2019-12-16 | Stop reason: HOSPADM

## 2019-12-16 RX ORDER — LOSARTAN POTASSIUM 50 MG/1
50 TABLET ORAL DAILY
Qty: 30 TABLET | Refills: 5 | Status: ON HOLD | OUTPATIENT
Start: 2019-12-16 | End: 2020-01-24 | Stop reason: HOSPADM

## 2019-12-16 RX ORDER — ACETAMINOPHEN 325 MG/1
650 TABLET ORAL EVERY 4 HOURS PRN
Status: DISCONTINUED | OUTPATIENT
Start: 2019-12-16 | End: 2019-12-16 | Stop reason: HOSPADM

## 2019-12-16 RX ORDER — MORPHINE SULFATE 2 MG/ML
1 INJECTION, SOLUTION INTRAMUSCULAR; INTRAVENOUS
Status: DISCONTINUED | OUTPATIENT
Start: 2019-12-16 | End: 2019-12-16 | Stop reason: HOSPADM

## 2019-12-16 RX ORDER — SODIUM CHLORIDE 0.9 % (FLUSH) 0.9 %
10 SYRINGE (ML) INJECTION PRN
Status: DISCONTINUED | OUTPATIENT
Start: 2019-12-16 | End: 2019-12-16 | Stop reason: HOSPADM

## 2020-01-06 RX ORDER — RANITIDINE 150 MG/1
TABLET ORAL
Qty: 180 TABLET | Refills: 0 | Status: SHIPPED | OUTPATIENT
Start: 2020-01-06 | End: 2020-03-12 | Stop reason: SDUPTHER

## 2020-01-10 ENCOUNTER — ANESTHESIA EVENT (OUTPATIENT)
Dept: OPERATING ROOM | Age: 60
DRG: 220 | End: 2020-01-10
Payer: COMMERCIAL

## 2020-01-10 NOTE — ANESTHESIA PRE PROCEDURE
Elizabeth Tatum MD   atorvastatin (LIPITOR) 20 MG tablet Take 1 tablet by mouth nightly 8/9/17   Elizabteh Tatum MD   clopidogrel (PLAVIX) 75 MG tablet Take 1 tablet by mouth daily 8/9/17   Elizabeth Tatum MD       Current medications:    Current Outpatient Medications   Medication Sig Dispense Refill    ranitidine (ZANTAC) 150 MG tablet TAKE 1 TABLET BY MOUTH TWICE A  tablet 0    losartan (COZAAR) 50 MG tablet Take 1 tablet by mouth daily 30 tablet 5    montelukast (SINGULAIR) 10 MG tablet Take 1 tablet by mouth nightly 90 tablet 1    triamterene-hydrochlorothiazide (MAXZIDE-25) 37.5-25 MG per tablet Take 1 tablet by mouth daily 90 tablet 1    tamsulosin (FLOMAX) 0.4 MG capsule Take 1 capsule by mouth daily 90 capsule 1    loratadine (CLARITIN) 10 MG tablet TAKE 1 TABLET BY MOUTH EVERY DAY 90 tablet 1    tadalafil (CIALIS) 5 MG tablet Take 1 tablet by mouth daily 90 tablet 1    doxycycline hyclate (VIBRAMYCIN) 100 MG capsule Take 1 capsule by mouth daily 90 capsule 1    allopurinol (ZYLOPRIM) 100 MG tablet Take 1 tablet by mouth daily 90 tablet 1    nitroGLYCERIN (NITROSTAT) 0.4 MG SL tablet Place 1 tablet under the tongue every 5 minutes as needed for Chest pain up to max of 3 total doses. If no relief after 1 dose, call 911. 25 tablet 3    nitroGLYCERIN (NITROSTAT) 0.4 MG SL tablet Place 0.4 mg under the tongue as needed for Chest pain up to max of 3 total doses. If no relief after 1 dose, call 911.  aspirin 81 MG chewable tablet Take 1 tablet by mouth daily 30 tablet 3    atorvastatin (LIPITOR) 20 MG tablet Take 1 tablet by mouth nightly 30 tablet 3    clopidogrel (PLAVIX) 75 MG tablet Take 1 tablet by mouth daily 30 tablet 3     No current facility-administered medications for this encounter. Allergies:     Allergies   Allergen Reactions    Chantix [Varenicline Tartrate] Other (See Comments)     depression    Lisinopril Other (See Comments)     cough    Testosterone Other (See around 15 mmHg to 20 mmHg present. 2.  Right coronary artery mid stents are widely patent. There is a  collateral circulation filling the OM-2 branch which is unchanged from  before. It fills large PD and PL branch. There is mild disease noted. 3.  Left main is patent. 4. LAD has mild disease noted. 5.  Circ has mid 99% stenosis present, which is also unchanged from before. 6.  OM-1 and OM-2 branches are filling. 7.  The patient has dilated aortic root aneurysm present. 8.  The patient has mid circ disease present, which is unchanged from before. RAY 2019  EF 50-55%   Left ventricular function is normal,   There is no evidence of thrombus in the Left Atrial Appendage. Bubble study was negative . Mild aortic regurgitation is noted. Mild mitral regurgitation is present. Dilation of the aortic root and the ascending aorta. CARDIOVASCULAR:  Normal apical impulse, regular rate and rhythm, 2/6 murmur RSB in sitting position    CP: NO   Exercise: NO       EK2020  Sinus bradycardia   Minimal voltage criteria for LVH, may be normal variant   Borderline ECG   When compared with ECG of 09-AUG-2017 07:06,   No significant change was found      Neuro/Psych:   (+) neuromuscular disease (s/p L4-5 carmelo, 2004):,             GI/Hepatic/Renal:   (+) hiatal hernia, GERD:, renal disease (BPH, slow stream):, morbid obesity (BMI: 35.9)         ROS comment: S/p marleen/appy. Endo/Other:              Pt had PAT visit. ROS comment: Hx genital warts     Fall with rib fracture of rib -2016 Abdominal:           Vascular:           ROS comment: Thoracic aneurysm  1.  Fusiform aneurysmal dilatation of the ascending thoracic aorta measures up to 4.5 x 5.2 cm. No prior imaging is available for comparison at the time of the dictation. These findings were discussed with Venus Haney  at 2019 10:36 AM EST.   2.  Mild fusiform aneurysmal dilatation of the descending thoracic aorta measures 2.6 x 3.1 cm.  3.  Small hiatal hernia. 4.  Calcifications of the right lobe of the thyroid gland, further evaluation with dedicated ultrasound of the thyroid gland is recommended. 5.  Coronary artery disease. Carotid US 1/14/2020  The right internal carotid artery demonstrates 0-50% stenosis .     The left internal carotid artery demonstrates 0-50% stenosis . Jennifer Mercedes Anesthesia Plan      general     ASA 3       Induction: intravenous. arterial line, BIS, central line, CVP, PA catheter and RAY  MIPS: Postoperative ventilation. Anesthetic plan and risks discussed with patient. Plan discussed with CRNA and attending.     Attending anesthesiologist reviewed and agrees with Pre Eval content              Puja Bond, FABIENNE - CNP   1/10/2020

## 2020-01-14 ENCOUNTER — HOSPITAL ENCOUNTER (OUTPATIENT)
Age: 60
Discharge: HOME | End: 2020-01-14
Payer: COMMERCIAL

## 2020-01-14 ENCOUNTER — HOSPITAL ENCOUNTER (OUTPATIENT)
Dept: GENERAL RADIOLOGY | Age: 60
Discharge: HOME | End: 2020-01-14
Payer: COMMERCIAL

## 2020-01-14 ENCOUNTER — HOSPITAL ENCOUNTER (OUTPATIENT)
Dept: PULMONOLOGY | Age: 60
Discharge: HOME | End: 2020-01-14
Payer: COMMERCIAL

## 2020-01-14 ENCOUNTER — HOSPITAL ENCOUNTER (OUTPATIENT)
Dept: ULTRASOUND IMAGING | Age: 60
Discharge: HOME | End: 2020-01-14
Payer: COMMERCIAL

## 2020-01-14 ENCOUNTER — HOSPITAL ENCOUNTER (OUTPATIENT)
Dept: PREADMISSION TESTING | Age: 60
Discharge: HOME | End: 2020-01-18
Payer: COMMERCIAL

## 2020-01-14 VITALS
BODY MASS INDEX: 35.83 KG/M2 | OXYGEN SATURATION: 98 % | TEMPERATURE: 97.6 F | SYSTOLIC BLOOD PRESSURE: 127 MMHG | HEIGHT: 74 IN | WEIGHT: 279.2 LBS | DIASTOLIC BLOOD PRESSURE: 89 MMHG | HEART RATE: 57 BPM | RESPIRATION RATE: 16 BRPM

## 2020-01-14 LAB
ANION GAP SERPL CALCULATED.3IONS-SCNC: 12 MMOL/L (ref 4–16)
ANISOCYTOSIS: ABNORMAL
APTT: 26.5 SECONDS (ref 25.1–37.1)
BACTERIA: ABNORMAL /HPF
BANDED NEUTROPHILS ABSOLUTE COUNT: 0.48 K/CU MM
BANDED NEUTROPHILS RELATIVE PERCENT: 6 % (ref 5–11)
BILIRUBIN URINE: NEGATIVE MG/DL
BLOOD, URINE: NEGATIVE
BUN BLDV-MCNC: 21 MG/DL (ref 6–23)
CALCIUM SERPL-MCNC: 9.2 MG/DL (ref 8.3–10.6)
CHLORIDE BLD-SCNC: 102 MMOL/L (ref 99–110)
CLARITY: CLEAR
CO2: 24 MMOL/L (ref 21–32)
COLOR: YELLOW
CREAT SERPL-MCNC: 1.1 MG/DL (ref 0.9–1.3)
DIFFERENTIAL TYPE: ABNORMAL
EKG ATRIAL RATE: 48 BPM
EKG DIAGNOSIS: NORMAL
EKG P AXIS: 55 DEGREES
EKG P-R INTERVAL: 148 MS
EKG Q-T INTERVAL: 450 MS
EKG QRS DURATION: 90 MS
EKG QTC CALCULATION (BAZETT): 402 MS
EKG R AXIS: -16 DEGREES
EKG T AXIS: 34 DEGREES
EKG VENTRICULAR RATE: 48 BPM
EOSINOPHILS ABSOLUTE: 0.2 K/CU MM
EOSINOPHILS RELATIVE PERCENT: 3 % (ref 0–3)
GFR AFRICAN AMERICAN: >60 ML/MIN/1.73M2
GFR NON-AFRICAN AMERICAN: >60 ML/MIN/1.73M2
GLUCOSE BLD-MCNC: 111 MG/DL (ref 70–99)
GLUCOSE, URINE: 50 MG/DL
HCT VFR BLD CALC: 44.1 % (ref 42–52)
HEMOGLOBIN: 14.3 GM/DL (ref 13.5–18)
INR BLD: 0.86 INDEX
KETONES, URINE: NEGATIVE MG/DL
LEUKOCYTE ESTERASE, URINE: NEGATIVE
LYMPHOCYTES ABSOLUTE: 3 K/CU MM
LYMPHOCYTES RELATIVE PERCENT: 38 % (ref 24–44)
MAGNESIUM: 1.9 MG/DL (ref 1.8–2.4)
MCH RBC QN AUTO: 28.9 PG (ref 27–31)
MCHC RBC AUTO-ENTMCNC: 32.4 % (ref 32–36)
MCV RBC AUTO: 89.3 FL (ref 78–100)
METAMYELOCYTES ABSOLUTE COUNT: 0.08 K/CU MM
METAMYELOCYTES PERCENT: 1 %
MONOCYTES ABSOLUTE: 0.8 K/CU MM
MONOCYTES RELATIVE PERCENT: 10 % (ref 0–4)
MUCUS: ABNORMAL HPF
NITRITE URINE, QUANTITATIVE: NEGATIVE
PDW BLD-RTO: 11.8 % (ref 11.7–14.9)
PH, URINE: 5 (ref 5–8)
PLATELET # BLD: 157 K/CU MM (ref 140–440)
PMV BLD AUTO: 11.4 FL (ref 7.5–11.1)
POTASSIUM SERPL-SCNC: 4.1 MMOL/L (ref 3.5–5.1)
PROTEIN UA: NEGATIVE MG/DL
PROTHROMBIN TIME: 10.4 SECONDS (ref 11.7–14.5)
RBC # BLD: 4.94 M/CU MM (ref 4.6–6.2)
RBC URINE: <1 /HPF (ref 0–3)
SEGMENTED NEUTROPHILS ABSOLUTE COUNT: 3.4 K/CU MM
SEGMENTED NEUTROPHILS RELATIVE PERCENT: 42 % (ref 36–66)
SODIUM BLD-SCNC: 138 MMOL/L (ref 135–145)
SPECIFIC GRAVITY UA: 1.02 (ref 1–1.03)
SQUAMOUS EPITHELIAL: <1 /HPF
TRICHOMONAS: ABNORMAL /HPF
UROBILINOGEN, URINE: NORMAL MG/DL (ref 0.2–1)
WBC # BLD: 8 K/CU MM (ref 4–10.5)
WBC # BLD: ABNORMAL 10*3/UL
WBC UA: 1 /HPF (ref 0–2)

## 2020-01-14 PROCEDURE — 86900 BLOOD TYPING SEROLOGIC ABO: CPT

## 2020-01-14 PROCEDURE — 71046 X-RAY EXAM CHEST 2 VIEWS: CPT

## 2020-01-14 PROCEDURE — 80048 BASIC METABOLIC PNL TOTAL CA: CPT

## 2020-01-14 PROCEDURE — P9016 RBC LEUKOCYTES REDUCED: HCPCS

## 2020-01-14 PROCEDURE — 85730 THROMBOPLASTIN TIME PARTIAL: CPT

## 2020-01-14 PROCEDURE — 85610 PROTHROMBIN TIME: CPT

## 2020-01-14 PROCEDURE — 93880 EXTRACRANIAL BILAT STUDY: CPT

## 2020-01-14 PROCEDURE — 85007 BL SMEAR W/DIFF WBC COUNT: CPT

## 2020-01-14 PROCEDURE — 87081 CULTURE SCREEN ONLY: CPT

## 2020-01-14 PROCEDURE — 81001 URINALYSIS AUTO W/SCOPE: CPT

## 2020-01-14 PROCEDURE — 85027 COMPLETE CBC AUTOMATED: CPT

## 2020-01-14 PROCEDURE — 86901 BLOOD TYPING SEROLOGIC RH(D): CPT

## 2020-01-14 PROCEDURE — 93005 ELECTROCARDIOGRAM TRACING: CPT | Performed by: SURGERY

## 2020-01-14 PROCEDURE — 93010 ELECTROCARDIOGRAM REPORT: CPT | Performed by: INTERNAL MEDICINE

## 2020-01-14 PROCEDURE — 86850 RBC ANTIBODY SCREEN: CPT

## 2020-01-14 PROCEDURE — 94010 BREATHING CAPACITY TEST: CPT

## 2020-01-14 PROCEDURE — 93971 EXTREMITY STUDY: CPT

## 2020-01-14 PROCEDURE — 86922 COMPATIBILITY TEST ANTIGLOB: CPT

## 2020-01-14 PROCEDURE — 83735 ASSAY OF MAGNESIUM: CPT

## 2020-01-14 RX ORDER — CEFAZOLIN SODIUM 2 G/100ML
2 INJECTION, SOLUTION INTRAVENOUS ONCE
Status: CANCELLED | OUTPATIENT
Start: 2020-01-20

## 2020-01-14 RX ORDER — SIMVASTATIN 40 MG
40 TABLET ORAL ONCE
Status: CANCELLED | OUTPATIENT
Start: 2020-01-20

## 2020-01-14 RX ORDER — CARVEDILOL 3.12 MG/1
3.12 TABLET ORAL ONCE
Status: CANCELLED | OUTPATIENT
Start: 2020-01-20

## 2020-01-14 RX ORDER — CHLORHEXIDINE GLUCONATE 0.12 MG/ML
30 RINSE ORAL ONCE
Status: CANCELLED | OUTPATIENT
Start: 2020-01-20

## 2020-01-14 ASSESSMENT — ENCOUNTER SYMPTOMS
SHORTNESS OF BREATH: 1
DYSPNEA ACTIVITY LEVEL: AFTER AMBULATING 1 FLIGHT OF STAIRS

## 2020-01-14 NOTE — PROGRESS NOTES
Cardiac Surgery Risk Factor Worksheet      STS Criteria  Possible Score Yes/No Score ICD 10 Notes   Emergency Case 6 No 0  Mag 1.9   Serum Creatinine      Ca 9.2   >1.2 0 No 0     >1.6 to <1.8mg/dl 1 No 0  Na 138   >.1.9 4 No 0  K 4.1   Acute/Chronic Renal Failure/Renal Insufficiency 0   No 0 GFR  Stage of CKD BUN 21  Creat 1.1   Left Ventricular Dysfunction(EF<40%) 3   No 0  EF 50-55%   Operative Mitral Valve Insufficiency 3   No 0  Mild MR   Reoperation 3 No 0     Age >71 and <74 years 1  No   0  Age 64   Age >75 years 2 No 0     Prior Vascular Surgery 2   Yes 2  PTCA 2010   COPD +History of Patient Use of Bronchodilators 2   Yes 2 Acuity of  COPD Moderately severe obstructive airways disease   COPD 0 Yes 0     Current Smoker of History of Smoking  0   Yes 0  Smoked 2 and 1/2 PPD x 41 years  Quit 2017   Anemia (Hemotocrit<0.34) 2   No 0  Hgb 14.3  Hct 44.1   ASA / Anticoagulants/ Bleeding Tendiencies / Antiplatelets 0   No 0  PT 10.4  INR 0.86   PTT 26.5  Plt 157   Operative Aortic Valve Stenosis 1 Yes 1  Moderate AI     Weight<143 lbs (  BMI<18.5) 1   No 0 Obesity with  BMI    Weight >200 lbs (BMI >30    0   Yes 0  Ht 6'2\"  Wt 126 kg  BMI 35.9   Diabetes Oral or Insulin Therapy 1   No 0 Type & Control A1c 5.5   Cerebrovascular Disease 1   No 0  0-50% bilateral carotids    Impaired Mobility 0 No 0     Walker/Cane/Wheelchair 0 No 0     Recent MI 0 No 0     Hypertension 0 Yes 0     Peripheral Vascular Disease 0 No 0     Lives Alone 0 No 0     Other (GI Auto Immune Hepatic etc) 0 No 0 Malnutrition &   Acuity    CHF & Type & Acuity GERD   TOTAL   5     Note The surgeon must be notified for all risk scores >7    Notified byLEFTY Gonzalez RN   1/14/2020

## 2020-01-14 NOTE — PROGRESS NOTES
his recovery period   He demonstrates a positive attitude and states he is willing to do the work of recovery   All questions answered to his satisfaction  Provided tour of [facility] waiting area and CVICU  And introduced to staff  Escorted to main entrance at 11:15

## 2020-01-15 LAB
CULTURE: NORMAL
Lab: NORMAL
SPECIMEN: NORMAL

## 2020-01-20 ENCOUNTER — HOSPITAL ENCOUNTER (INPATIENT)
Age: 60
LOS: 4 days | Discharge: HOME HEALTH | DRG: 220 | End: 2020-01-24
Attending: SURGERY | Admitting: SURGERY
Payer: COMMERCIAL

## 2020-01-20 ENCOUNTER — ANESTHESIA (OUTPATIENT)
Dept: OPERATING ROOM | Age: 60
DRG: 220 | End: 2020-01-20
Payer: COMMERCIAL

## 2020-01-20 ENCOUNTER — APPOINTMENT (OUTPATIENT)
Dept: GENERAL RADIOLOGY | Age: 60
DRG: 220 | End: 2020-01-20
Attending: SURGERY
Payer: COMMERCIAL

## 2020-01-20 VITALS
TEMPERATURE: 98.2 F | DIASTOLIC BLOOD PRESSURE: 67 MMHG | SYSTOLIC BLOOD PRESSURE: 99 MMHG | RESPIRATION RATE: 24 BRPM | OXYGEN SATURATION: 100 %

## 2020-01-20 PROBLEM — I71.21 ASCENDING AORTIC ANEURYSM (HCC): Status: ACTIVE | Noted: 2020-01-20

## 2020-01-20 LAB
ANION GAP SERPL CALCULATED.3IONS-SCNC: 14 MMOL/L (ref 4–16)
APTT: 27.7 SECONDS (ref 25.1–37.1)
BASE EXCESS MIXED: 0.6 (ref 0–1.2)
BASE EXCESS MIXED: 0.7 (ref 0–1.2)
BASE EXCESS MIXED: 0.7 (ref 0–1.2)
BASE EXCESS MIXED: 0.8 (ref 0–1.2)
BASE EXCESS MIXED: 1.2 (ref 0–1.2)
BASE EXCESS MIXED: 1.5 (ref 0–1.2)
BASE EXCESS MIXED: 1.8 (ref 0–1.2)
BASE EXCESS MIXED: 2.1 (ref 0–1.2)
BASE EXCESS MIXED: 2.2 (ref 0–1.2)
BASE EXCESS MIXED: 2.3 (ref 0–1.2)
BASE EXCESS MIXED: 2.6 (ref 0–1.2)
BASE EXCESS MIXED: 2.7 (ref 0–1.2)
BASE EXCESS MIXED: 3.1 (ref 0–1.2)
BASE EXCESS MIXED: 4.9 (ref 0–1.2)
BASE EXCESS MIXED: 6 (ref 0–1.2)
BASE EXCESS MIXED: 6.1 (ref 0–1.2)
BASE EXCESS: ABNORMAL (ref 0–3.3)
BUN BLDV-MCNC: 17 MG/DL (ref 6–23)
CALCIUM SERPL-MCNC: 8.1 MG/DL (ref 8.3–10.6)
CHLORIDE BLD-SCNC: 108 MMOL/L (ref 99–110)
CO2 CONTENT: 27.3 MMOL/L (ref 19–24)
CO2: 22 MMOL/L (ref 21–32)
CO2: 23 MMOL/L (ref 21–32)
CO2: 24 MMOL/L (ref 21–32)
CO2: 24 MMOL/L (ref 21–32)
CO2: 26 MMOL/L (ref 21–32)
CO2: 26 MMOL/L (ref 21–32)
CO2: 27 MMOL/L (ref 21–32)
CO2: 28 MMOL/L (ref 21–32)
CREAT SERPL-MCNC: 1.2 MG/DL (ref 0.9–1.3)
GFR AFRICAN AMERICAN: >60 ML/MIN/1.73M2
GFR AFRICAN AMERICAN: >60 ML/MIN/1.73M2
GFR NON-AFRICAN AMERICAN: 53 ML/MIN/1.73M2
GFR NON-AFRICAN AMERICAN: >60 ML/MIN/1.73M2
GLUCOSE BLD-MCNC: 103 MG/DL (ref 70–99)
GLUCOSE BLD-MCNC: 104 MG/DL (ref 70–99)
GLUCOSE BLD-MCNC: 108 MG/DL (ref 70–99)
GLUCOSE BLD-MCNC: 122 MG/DL (ref 70–99)
GLUCOSE BLD-MCNC: 124 MG/DL (ref 70–99)
GLUCOSE BLD-MCNC: 129 MG/DL (ref 70–99)
GLUCOSE BLD-MCNC: 132 MG/DL (ref 70–99)
GLUCOSE BLD-MCNC: 136 MG/DL (ref 70–99)
GLUCOSE BLD-MCNC: 138 MG/DL (ref 70–99)
GLUCOSE BLD-MCNC: 143 MG/DL (ref 70–99)
GLUCOSE BLD-MCNC: 147 MG/DL (ref 70–99)
GLUCOSE BLD-MCNC: 148 MG/DL (ref 70–99)
GLUCOSE BLD-MCNC: 150 MG/DL (ref 70–99)
GLUCOSE BLD-MCNC: 153 MG/DL (ref 70–99)
GLUCOSE BLD-MCNC: 171 MG/DL (ref 70–99)
GLUCOSE BLD-MCNC: 195 MG/DL (ref 70–99)
GLUCOSE BLD-MCNC: 81 MG/DL (ref 70–99)
HCO3 ARTERIAL: 20.6 MMOL/L (ref 18–23)
HCO3 ARTERIAL: 21.1 MMOL/L (ref 18–23)
HCO3 ARTERIAL: 21.2 MMOL/L (ref 18–23)
HCO3 ARTERIAL: 21.6 MMOL/L (ref 18–23)
HCO3 ARTERIAL: 22.1 MMOL/L (ref 18–23)
HCO3 ARTERIAL: 22.2 MMOL/L (ref 18–23)
HCO3 ARTERIAL: 23.1 MMOL/L (ref 18–23)
HCO3 ARTERIAL: 24.6 MMOL/L (ref 18–23)
HCO3 ARTERIAL: 24.6 MMOL/L (ref 18–23)
HCO3 ARTERIAL: 25 MMOL/L (ref 18–23)
HCO3 ARTERIAL: 25.1 MMOL/L (ref 18–23)
HCO3 ARTERIAL: 25.2 MMOL/L (ref 18–23)
HCO3 ARTERIAL: 26.1 MMOL/L (ref 18–23)
HCO3 ARTERIAL: 26.3 MMOL/L (ref 18–23)
HCO3 ARTERIAL: 26.6 MMOL/L (ref 18–23)
HCO3 ARTERIAL: 26.8 MMOL/L (ref 18–23)
HCT VFR BLD CALC: 24 % (ref 42–52)
HCT VFR BLD CALC: 25 % (ref 42–52)
HCT VFR BLD CALC: 26 % (ref 42–52)
HCT VFR BLD CALC: 27 % (ref 42–52)
HCT VFR BLD CALC: 28 % (ref 42–52)
HCT VFR BLD CALC: 28 % (ref 42–52)
HCT VFR BLD CALC: 30 % (ref 42–52)
HCT VFR BLD CALC: 32 % (ref 42–52)
HCT VFR BLD CALC: 32 % (ref 42–52)
HCT VFR BLD CALC: 33 % (ref 42–52)
HCT VFR BLD CALC: 33 % (ref 42–52)
HCT VFR BLD CALC: 35 % (ref 42–52)
HCT VFR BLD CALC: 35 % (ref 42–52)
HCT VFR BLD CALC: 37 % (ref 42–52)
HCT VFR BLD CALC: 38.3 % (ref 42–52)
HEMOGLOBIN: 10.2 GM/DL (ref 13.5–18)
HEMOGLOBIN: 10.7 GM/DL (ref 13.5–18)
HEMOGLOBIN: 11 GM/DL (ref 13.5–18)
HEMOGLOBIN: 11.3 GM/DL (ref 13.5–18)
HEMOGLOBIN: 11.4 GM/DL (ref 13.5–18)
HEMOGLOBIN: 11.8 GM/DL (ref 13.5–18)
HEMOGLOBIN: 11.8 GM/DL (ref 13.5–18)
HEMOGLOBIN: 12.3 GM/DL (ref 13.5–18)
HEMOGLOBIN: 12.6 GM/DL (ref 13.5–18)
HEMOGLOBIN: 8.2 GM/DL (ref 13.5–18)
HEMOGLOBIN: 8.6 GM/DL (ref 13.5–18)
HEMOGLOBIN: 8.9 GM/DL (ref 13.5–18)
HEMOGLOBIN: 9.3 GM/DL (ref 13.5–18)
HEMOGLOBIN: 9.4 GM/DL (ref 13.5–18)
HEMOGLOBIN: 9.6 GM/DL (ref 13.5–18)
INR BLD: 1.26 INDEX
MAGNESIUM: 2 MG/DL (ref 1.8–2.4)
MCH RBC QN AUTO: 29.4 PG (ref 27–31)
MCHC RBC AUTO-ENTMCNC: 32.1 % (ref 32–36)
MCV RBC AUTO: 91.4 FL (ref 78–100)
O2 SATURATION: 100 % (ref 96–97)
O2 SATURATION: 49.1 % (ref 96–97)
O2 SATURATION: 95 % (ref 96–97)
O2 SATURATION: 95.5 % (ref 96–97)
O2 SATURATION: 96 % (ref 96–97)
O2 SATURATION: 98.5 % (ref 96–97)
O2 SATURATION: 99.3 % (ref 96–97)
O2 SATURATION: 99.5 % (ref 96–97)
O2 SATURATION: 99.9 % (ref 96–97)
PCO2 ARTERIAL: 30.8 MMHG (ref 32–45)
PCO2 ARTERIAL: 32.4 MMHG (ref 32–45)
PCO2 ARTERIAL: 36.6 MMHG (ref 32–45)
PCO2 ARTERIAL: 38.3 MMHG (ref 32–45)
PCO2 ARTERIAL: 40 MMHG (ref 32–45)
PCO2 ARTERIAL: 40.2 MMHG (ref 32–45)
PCO2 ARTERIAL: 44.3 MMHG (ref 32–45)
PCO2 ARTERIAL: 44.6 MMHG (ref 32–45)
PCO2 ARTERIAL: 44.7 MMHG (ref 32–45)
PCO2 ARTERIAL: 46.3 MMHG (ref 32–45)
PCO2 ARTERIAL: 46.5 MMHG (ref 32–45)
PCO2 ARTERIAL: 46.9 MMHG (ref 32–45)
PCO2 ARTERIAL: 50.5 MMHG (ref 32–45)
PCO2 ARTERIAL: 50.8 MMHG (ref 32–45)
PCO2 ARTERIAL: 54.8 MMHG (ref 32–45)
PCO2 ARTERIAL: 55.6 MMHG (ref 32–45)
PDW BLD-RTO: 12.2 % (ref 11.7–14.9)
PH BLOOD: 7.25 (ref 7.34–7.45)
PH BLOOD: 7.26 (ref 7.34–7.45)
PH BLOOD: 7.26 (ref 7.34–7.45)
PH BLOOD: 7.27 (ref 7.34–7.45)
PH BLOOD: 7.29 (ref 7.34–7.45)
PH BLOOD: 7.3 (ref 7.34–7.45)
PH BLOOD: 7.34 (ref 7.34–7.45)
PH BLOOD: 7.35 (ref 7.34–7.45)
PH BLOOD: 7.36 (ref 7.34–7.45)
PH BLOOD: 7.36 (ref 7.34–7.45)
PH BLOOD: 7.37 (ref 7.34–7.45)
PH BLOOD: 7.39 (ref 7.34–7.45)
PH BLOOD: 7.42 (ref 7.34–7.45)
PH BLOOD: 7.43 (ref 7.34–7.45)
PH BLOOD: 7.45 (ref 7.34–7.45)
PH BLOOD: 7.45 (ref 7.34–7.45)
PLATELET # BLD: ABNORMAL K/CU MM (ref 140–440)
PMV BLD AUTO: 11.1 FL (ref 7.5–11.1)
PO2 ARTERIAL: 120.7 MMHG (ref 75–100)
PO2 ARTERIAL: 176.4 MMHG (ref 75–100)
PO2 ARTERIAL: 181.8 MMHG (ref 75–100)
PO2 ARTERIAL: 27.8 MMHG (ref 75–100)
PO2 ARTERIAL: 301.5 MMHG (ref 75–100)
PO2 ARTERIAL: 304.9 MMHG (ref 75–100)
PO2 ARTERIAL: 307.8 MMHG (ref 75–100)
PO2 ARTERIAL: 440.4 MMHG (ref 75–100)
PO2 ARTERIAL: 444 MMHG (ref 75–100)
PO2 ARTERIAL: 618.3 MMHG (ref 75–100)
PO2 ARTERIAL: 634.4 MMHG (ref 75–100)
PO2 ARTERIAL: 652 MMHG (ref 75–100)
PO2 ARTERIAL: 653.6 MMHG (ref 75–100)
PO2 ARTERIAL: 74 MMHG (ref 75–100)
PO2 ARTERIAL: 74.4 MMHG (ref 75–100)
PO2 ARTERIAL: 86.9 MMHG (ref 75–100)
POC ACT PLUS: 100 SEC
POC ACT PLUS: 106 SEC
POC ACT PLUS: 114 SEC
POC ACT PLUS: 407 SEC
POC ACT PLUS: 460 SEC
POC ACT PLUS: 463 SEC
POC ACT PLUS: 509 SEC
POC ACT PLUS: 540 SEC
POC CALCIUM: 1 MMOL/L (ref 1.12–1.32)
POC CALCIUM: 1.07 MMOL/L (ref 1.12–1.32)
POC CALCIUM: 1.08 MMOL/L (ref 1.12–1.32)
POC CALCIUM: 1.08 MMOL/L (ref 1.12–1.32)
POC CALCIUM: 1.16 MMOL/L (ref 1.12–1.32)
POC CALCIUM: 1.16 MMOL/L (ref 1.12–1.32)
POC CALCIUM: 1.17 MMOL/L (ref 1.12–1.32)
POC CALCIUM: 1.18 MMOL/L (ref 1.12–1.32)
POC CALCIUM: 1.2 MMOL/L (ref 1.12–1.32)
POC CALCIUM: 1.21 MMOL/L (ref 1.12–1.32)
POC CALCIUM: 1.24 MMOL/L (ref 1.12–1.32)
POC CALCIUM: 1.26 MMOL/L (ref 1.12–1.32)
POC CALCIUM: 1.27 MMOL/L (ref 1.12–1.32)
POC CALCIUM: 1.28 MMOL/L (ref 1.12–1.32)
POC CALCIUM: 1.34 MMOL/L (ref 1.12–1.32)
POC CALCIUM: 1.4 MMOL/L (ref 1.12–1.32)
POC CHLORIDE: 104 MMOL/L (ref 98–109)
POC CHLORIDE: 105 MMOL/L (ref 98–109)
POC CHLORIDE: 106 MMOL/L (ref 98–109)
POC CHLORIDE: 106 MMOL/L (ref 98–109)
POC CHLORIDE: 107 MMOL/L (ref 98–109)
POC CHLORIDE: 108 MMOL/L (ref 98–109)
POC CHLORIDE: 108 MMOL/L (ref 98–109)
POC CHLORIDE: 109 MMOL/L (ref 98–109)
POC CHLORIDE: 109 MMOL/L (ref 98–109)
POC CHLORIDE: 110 MMOL/L (ref 98–109)
POC CREATININE: 0.8 MG/DL (ref 0.9–1.3)
POC CREATININE: 0.8 MG/DL (ref 0.9–1.3)
POC CREATININE: 0.9 MG/DL (ref 0.9–1.3)
POC CREATININE: 1.1 MG/DL (ref 0.9–1.3)
POC CREATININE: 1.1 MG/DL (ref 0.9–1.3)
POC CREATININE: 1.2 MG/DL (ref 0.9–1.3)
POC CREATININE: 1.2 MG/DL (ref 0.9–1.3)
POC CREATININE: 1.3 MG/DL (ref 0.9–1.3)
POC CREATININE: 1.4 MG/DL (ref 0.9–1.3)
POC CREATININE: 3.7 MG/DL (ref 0.9–1.3)
POTASSIUM SERPL-SCNC: 3.6 MMOL/L (ref 3.5–4.5)
POTASSIUM SERPL-SCNC: 4 MMOL/L (ref 3.5–4.5)
POTASSIUM SERPL-SCNC: 4.2 MMOL/L (ref 3.5–4.5)
POTASSIUM SERPL-SCNC: 4.3 MMOL/L (ref 3.5–4.5)
POTASSIUM SERPL-SCNC: 4.3 MMOL/L (ref 3.5–4.5)
POTASSIUM SERPL-SCNC: 4.4 MMOL/L (ref 3.5–4.5)
POTASSIUM SERPL-SCNC: 4.5 MMOL/L (ref 3.5–4.5)
POTASSIUM SERPL-SCNC: 4.6 MMOL/L (ref 3.5–4.5)
POTASSIUM SERPL-SCNC: 4.7 MMOL/L (ref 3.5–4.5)
POTASSIUM SERPL-SCNC: 4.7 MMOL/L (ref 3.5–4.5)
POTASSIUM SERPL-SCNC: 4.7 MMOL/L (ref 3.5–5.1)
POTASSIUM SERPL-SCNC: 4.7 MMOL/L (ref 3.5–5.1)
PROTHROMBIN TIME: 15.3 SECONDS (ref 11.7–14.5)
RBC # BLD: 4.19 M/CU MM (ref 4.6–6.2)
SODIUM BLD-SCNC: 139 MMOL/L (ref 138–146)
SODIUM BLD-SCNC: 141 MMOL/L (ref 138–146)
SODIUM BLD-SCNC: 141 MMOL/L (ref 138–146)
SODIUM BLD-SCNC: 142 MMOL/L (ref 138–146)
SODIUM BLD-SCNC: 143 MMOL/L (ref 138–146)
SODIUM BLD-SCNC: 143 MMOL/L (ref 138–146)
SODIUM BLD-SCNC: 144 MMOL/L (ref 135–145)
SODIUM BLD-SCNC: 144 MMOL/L (ref 138–146)
SODIUM BLD-SCNC: 145 MMOL/L (ref 138–146)
SODIUM BLD-SCNC: 145 MMOL/L (ref 138–146)
SODIUM BLD-SCNC: 146 MMOL/L (ref 138–146)
SODIUM BLD-SCNC: 147 MMOL/L (ref 138–146)
SODIUM BLD-SCNC: 149 MMOL/L (ref 138–146)
SOURCE, BLOOD GAS: ABNORMAL
WBC # BLD: 17.9 K/CU MM (ref 4–10.5)

## 2020-01-20 PROCEDURE — 3600000018 HC SURGERY OHS ADDTL 15MIN: Performed by: SURGERY

## 2020-01-20 PROCEDURE — 6370000000 HC RX 637 (ALT 250 FOR IP): Performed by: SURGERY

## 2020-01-20 PROCEDURE — 6370000000 HC RX 637 (ALT 250 FOR IP)

## 2020-01-20 PROCEDURE — C1768 GRAFT, VASCULAR: HCPCS | Performed by: SURGERY

## 2020-01-20 PROCEDURE — 85730 THROMBOPLASTIN TIME PARTIAL: CPT

## 2020-01-20 PROCEDURE — 94640 AIRWAY INHALATION TREATMENT: CPT

## 2020-01-20 PROCEDURE — 6360000002 HC RX W HCPCS: Performed by: NURSE ANESTHETIST, CERTIFIED REGISTERED

## 2020-01-20 PROCEDURE — 2580000003 HC RX 258: Performed by: NURSE ANESTHETIST, CERTIFIED REGISTERED

## 2020-01-20 PROCEDURE — 6360000002 HC RX W HCPCS: Performed by: SURGERY

## 2020-01-20 PROCEDURE — 7100000000 HC PACU RECOVERY - FIRST 15 MIN

## 2020-01-20 PROCEDURE — 82803 BLOOD GASES ANY COMBINATION: CPT

## 2020-01-20 PROCEDURE — 84295 ASSAY OF SERUM SODIUM: CPT

## 2020-01-20 PROCEDURE — 82330 ASSAY OF CALCIUM: CPT

## 2020-01-20 PROCEDURE — C1751 CATH, INF, PER/CENT/MIDLINE: HCPCS | Performed by: SURGERY

## 2020-01-20 PROCEDURE — 2720000010 HC SURG SUPPLY STERILE: Performed by: SURGERY

## 2020-01-20 PROCEDURE — P9045 ALBUMIN (HUMAN), 5%, 250 ML: HCPCS | Performed by: SURGERY

## 2020-01-20 PROCEDURE — 2709999900 HC NON-CHARGEABLE SUPPLY: Performed by: SURGERY

## 2020-01-20 PROCEDURE — 2580000003 HC RX 258: Performed by: SURGERY

## 2020-01-20 PROCEDURE — 2500000003 HC RX 250 WO HCPCS: Performed by: SURGERY

## 2020-01-20 PROCEDURE — 02RX0JZ REPLACEMENT OF THORACIC AORTA, ASCENDING/ARCH WITH SYNTHETIC SUBSTITUTE, OPEN APPROACH: ICD-10-PCS | Performed by: SURGERY

## 2020-01-20 PROCEDURE — 6370000000 HC RX 637 (ALT 250 FOR IP): Performed by: NURSE ANESTHETIST, CERTIFIED REGISTERED

## 2020-01-20 PROCEDURE — 84132 ASSAY OF SERUM POTASSIUM: CPT

## 2020-01-20 PROCEDURE — L8670 VASCULAR GRAFT, SYNTHETIC: HCPCS | Performed by: SURGERY

## 2020-01-20 PROCEDURE — 2580000003 HC RX 258: Performed by: PHYSICIAN ASSISTANT

## 2020-01-20 PROCEDURE — 6370000000 HC RX 637 (ALT 250 FOR IP): Performed by: PHYSICIAN ASSISTANT

## 2020-01-20 PROCEDURE — 88305 TISSUE EXAM BY PATHOLOGIST: CPT

## 2020-01-20 PROCEDURE — 80048 BASIC METABOLIC PNL TOTAL CA: CPT

## 2020-01-20 PROCEDURE — 85018 HEMOGLOBIN: CPT

## 2020-01-20 PROCEDURE — 36620 INSERTION CATHETER ARTERY: CPT

## 2020-01-20 PROCEDURE — 2100000000 HC CCU R&B

## 2020-01-20 PROCEDURE — 3700000000 HC ANESTHESIA ATTENDED CARE: Performed by: SURGERY

## 2020-01-20 PROCEDURE — 83735 ASSAY OF MAGNESIUM: CPT

## 2020-01-20 PROCEDURE — 2500000003 HC RX 250 WO HCPCS: Performed by: NURSE ANESTHETIST, CERTIFIED REGISTERED

## 2020-01-20 PROCEDURE — 5A1221Z PERFORMANCE OF CARDIAC OUTPUT, CONTINUOUS: ICD-10-PCS | Performed by: SURGERY

## 2020-01-20 PROCEDURE — 02RF0JZ REPLACEMENT OF AORTIC VALVE WITH SYNTHETIC SUBSTITUTE, OPEN APPROACH: ICD-10-PCS | Performed by: SURGERY

## 2020-01-20 PROCEDURE — 3600000008 HC SURGERY OHS BASE: Performed by: SURGERY

## 2020-01-20 PROCEDURE — 85027 COMPLETE CBC AUTOMATED: CPT

## 2020-01-20 PROCEDURE — 82962 GLUCOSE BLOOD TEST: CPT

## 2020-01-20 PROCEDURE — C1729 CATH, DRAINAGE: HCPCS | Performed by: SURGERY

## 2020-01-20 PROCEDURE — 2780000006 HC MISC HEART VALVE: Performed by: SURGERY

## 2020-01-20 PROCEDURE — 85347 COAGULATION TIME ACTIVATED: CPT

## 2020-01-20 PROCEDURE — 85014 HEMATOCRIT: CPT

## 2020-01-20 PROCEDURE — 3700000001 HC ADD 15 MINUTES (ANESTHESIA): Performed by: SURGERY

## 2020-01-20 PROCEDURE — 2580000003 HC RX 258: Performed by: ANESTHESIOLOGY

## 2020-01-20 PROCEDURE — 94002 VENT MGMT INPAT INIT DAY: CPT

## 2020-01-20 PROCEDURE — P9045 ALBUMIN (HUMAN), 5%, 250 ML: HCPCS | Performed by: NURSE ANESTHETIST, CERTIFIED REGISTERED

## 2020-01-20 PROCEDURE — 71045 X-RAY EXAM CHEST 1 VIEW: CPT

## 2020-01-20 PROCEDURE — C1894 INTRO/SHEATH, NON-LASER: HCPCS | Performed by: SURGERY

## 2020-01-20 PROCEDURE — 36556 INSERT NON-TUNNEL CV CATH: CPT

## 2020-01-20 PROCEDURE — 6360000002 HC RX W HCPCS: Performed by: PHYSICIAN ASSISTANT

## 2020-01-20 PROCEDURE — 85610 PROTHROMBIN TIME: CPT

## 2020-01-20 DEVICE — IMPLANTABLE DEVICE: Type: IMPLANTABLE DEVICE | Site: HEART | Status: FUNCTIONAL

## 2020-01-20 DEVICE — FELT SURG W6XL6IN THK1.65MM PTFE FOR THE REP OF SEPT DEFCT: Type: IMPLANTABLE DEVICE | Site: HEART | Status: FUNCTIONAL

## 2020-01-20 DEVICE — VALVE AORT DIA27MM H18.5MM ORIFICE DIA24MM SUT RNG DIA32MM: Type: IMPLANTABLE DEVICE | Site: HEART | Status: FUNCTIONAL

## 2020-01-20 RX ORDER — PROMETHAZINE HYDROCHLORIDE 25 MG/ML
6.25 INJECTION, SOLUTION INTRAMUSCULAR; INTRAVENOUS ONCE
Status: COMPLETED | OUTPATIENT
Start: 2020-01-20 | End: 2020-01-21

## 2020-01-20 RX ORDER — IPRATROPIUM BROMIDE AND ALBUTEROL SULFATE 2.5; .5 MG/3ML; MG/3ML
1 SOLUTION RESPIRATORY (INHALATION)
Status: DISCONTINUED | OUTPATIENT
Start: 2020-01-20 | End: 2020-01-24 | Stop reason: HOSPADM

## 2020-01-20 RX ORDER — HYDROMORPHONE HCL 110MG/55ML
PATIENT CONTROLLED ANALGESIA SYRINGE INTRAVENOUS PRN
Status: DISCONTINUED | OUTPATIENT
Start: 2020-01-20 | End: 2020-01-20 | Stop reason: SDUPTHER

## 2020-01-20 RX ORDER — SODIUM PHOSPHATE, DIBASIC AND SODIUM PHOSPHATE, MONOBASIC 7; 19 G/133ML; G/133ML
1 ENEMA RECTAL DAILY PRN
Status: DISCONTINUED | OUTPATIENT
Start: 2020-01-20 | End: 2020-01-24 | Stop reason: HOSPADM

## 2020-01-20 RX ORDER — MONTELUKAST SODIUM 10 MG/1
10 TABLET ORAL NIGHTLY
Status: DISCONTINUED | OUTPATIENT
Start: 2020-01-20 | End: 2020-01-24 | Stop reason: HOSPADM

## 2020-01-20 RX ORDER — CHLORHEXIDINE GLUCONATE 0.12 MG/ML
30 RINSE ORAL ONCE
Status: COMPLETED | OUTPATIENT
Start: 2020-01-20 | End: 2020-01-20

## 2020-01-20 RX ORDER — CARVEDILOL 3.12 MG/1
3.12 TABLET ORAL 2 TIMES DAILY
Status: DISCONTINUED | OUTPATIENT
Start: 2020-01-20 | End: 2020-01-23 | Stop reason: DRUGHIGH

## 2020-01-20 RX ORDER — AMIODARONE HYDROCHLORIDE 200 MG/1
200 TABLET ORAL 3 TIMES DAILY
Status: DISPENSED | OUTPATIENT
Start: 2020-01-20 | End: 2020-01-23

## 2020-01-20 RX ORDER — ASPIRIN 81 MG/1
81 TABLET ORAL DAILY
Status: DISCONTINUED | OUTPATIENT
Start: 2020-01-20 | End: 2020-01-24 | Stop reason: HOSPADM

## 2020-01-20 RX ORDER — MAGNESIUM SULFATE IN WATER 40 MG/ML
2 INJECTION, SOLUTION INTRAVENOUS PRN
Status: DISCONTINUED | OUTPATIENT
Start: 2020-01-20 | End: 2020-01-24 | Stop reason: HOSPADM

## 2020-01-20 RX ORDER — ONDANSETRON 2 MG/ML
INJECTION INTRAMUSCULAR; INTRAVENOUS PRN
Status: DISCONTINUED | OUTPATIENT
Start: 2020-01-20 | End: 2020-01-20 | Stop reason: SDUPTHER

## 2020-01-20 RX ORDER — ACETAMINOPHEN 10 MG/ML
1000 INJECTION, SOLUTION INTRAVENOUS EVERY 6 HOURS
Status: COMPLETED | OUTPATIENT
Start: 2020-01-20 | End: 2020-01-21

## 2020-01-20 RX ORDER — SODIUM CHLORIDE 0.9 % (FLUSH) 0.9 %
10 SYRINGE (ML) INJECTION PRN
Status: DISCONTINUED | OUTPATIENT
Start: 2020-01-20 | End: 2020-01-24 | Stop reason: HOSPADM

## 2020-01-20 RX ORDER — ALBUMIN, HUMAN INJ 5% 5 %
12.5 SOLUTION INTRAVENOUS ONCE
Status: COMPLETED | OUTPATIENT
Start: 2020-01-20 | End: 2020-01-20

## 2020-01-20 RX ORDER — SODIUM CHLORIDE 450 MG/100ML
INJECTION, SOLUTION INTRAVENOUS CONTINUOUS
Status: DISCONTINUED | OUTPATIENT
Start: 2020-01-20 | End: 2020-01-23

## 2020-01-20 RX ORDER — DEXTROSE MONOHYDRATE 25 G/50ML
12.5 INJECTION, SOLUTION INTRAVENOUS PRN
Status: DISCONTINUED | OUTPATIENT
Start: 2020-01-20 | End: 2020-01-24 | Stop reason: HOSPADM

## 2020-01-20 RX ORDER — SODIUM CHLORIDE 0.9 % (FLUSH) 0.9 %
10 SYRINGE (ML) INJECTION EVERY 12 HOURS SCHEDULED
Status: DISCONTINUED | OUTPATIENT
Start: 2020-01-20 | End: 2020-01-24 | Stop reason: HOSPADM

## 2020-01-20 RX ORDER — NICOTINE POLACRILEX 4 MG
15 LOZENGE BUCCAL PRN
Status: DISCONTINUED | OUTPATIENT
Start: 2020-01-20 | End: 2020-01-24 | Stop reason: HOSPADM

## 2020-01-20 RX ORDER — SUCCINYLCHOLINE/SOD CL,ISO/PF 100 MG/5ML
SYRINGE (ML) INTRAVENOUS PRN
Status: DISCONTINUED | OUTPATIENT
Start: 2020-01-20 | End: 2020-01-20 | Stop reason: SDUPTHER

## 2020-01-20 RX ORDER — SODIUM CHLORIDE, SODIUM LACTATE, POTASSIUM CHLORIDE, CALCIUM CHLORIDE 600; 310; 30; 20 MG/100ML; MG/100ML; MG/100ML; MG/100ML
INJECTION, SOLUTION INTRAVENOUS CONTINUOUS
Status: DISCONTINUED | OUTPATIENT
Start: 2020-01-20 | End: 2020-01-20

## 2020-01-20 RX ORDER — PANTOPRAZOLE SODIUM 40 MG/1
40 TABLET, DELAYED RELEASE ORAL DAILY
Status: DISCONTINUED | OUTPATIENT
Start: 2020-01-20 | End: 2020-01-24 | Stop reason: HOSPADM

## 2020-01-20 RX ORDER — TAMSULOSIN HYDROCHLORIDE 0.4 MG/1
0.4 CAPSULE ORAL DAILY
Status: DISCONTINUED | OUTPATIENT
Start: 2020-01-20 | End: 2020-01-24 | Stop reason: HOSPADM

## 2020-01-20 RX ORDER — NITROGLYCERIN 20 MG/100ML
10 INJECTION INTRAVENOUS CONTINUOUS PRN
Status: DISCONTINUED | OUTPATIENT
Start: 2020-01-20 | End: 2020-01-24 | Stop reason: HOSPADM

## 2020-01-20 RX ORDER — PROPOFOL 10 MG/ML
INJECTION, EMULSION INTRAVENOUS PRN
Status: DISCONTINUED | OUTPATIENT
Start: 2020-01-20 | End: 2020-01-20 | Stop reason: SDUPTHER

## 2020-01-20 RX ORDER — FENTANYL CITRATE 50 UG/ML
50 INJECTION, SOLUTION INTRAMUSCULAR; INTRAVENOUS
Status: DISCONTINUED | OUTPATIENT
Start: 2020-01-20 | End: 2020-01-20

## 2020-01-20 RX ORDER — VECURONIUM BROMIDE 1 MG/ML
INJECTION, POWDER, LYOPHILIZED, FOR SOLUTION INTRAVENOUS PRN
Status: DISCONTINUED | OUTPATIENT
Start: 2020-01-20 | End: 2020-01-20 | Stop reason: SDUPTHER

## 2020-01-20 RX ORDER — ALBUMIN, HUMAN INJ 5% 5 %
SOLUTION INTRAVENOUS PRN
Status: DISCONTINUED | OUTPATIENT
Start: 2020-01-20 | End: 2020-01-20 | Stop reason: SDUPTHER

## 2020-01-20 RX ORDER — FENTANYL CITRATE 50 UG/ML
50 INJECTION, SOLUTION INTRAMUSCULAR; INTRAVENOUS
Status: DISPENSED | OUTPATIENT
Start: 2020-01-20 | End: 2020-01-21

## 2020-01-20 RX ORDER — MIDAZOLAM HYDROCHLORIDE 1 MG/ML
INJECTION INTRAMUSCULAR; INTRAVENOUS PRN
Status: DISCONTINUED | OUTPATIENT
Start: 2020-01-20 | End: 2020-01-20 | Stop reason: SDUPTHER

## 2020-01-20 RX ORDER — DIPHENHYDRAMINE HYDROCHLORIDE 50 MG/ML
INJECTION INTRAMUSCULAR; INTRAVENOUS PRN
Status: DISCONTINUED | OUTPATIENT
Start: 2020-01-20 | End: 2020-01-20 | Stop reason: SDUPTHER

## 2020-01-20 RX ORDER — METOPROLOL TARTRATE 5 MG/5ML
2.5 INJECTION INTRAVENOUS EVERY 10 MIN PRN
Status: DISCONTINUED | OUTPATIENT
Start: 2020-01-20 | End: 2020-01-24 | Stop reason: HOSPADM

## 2020-01-20 RX ORDER — HYDROMORPHONE HCL 110MG/55ML
PATIENT CONTROLLED ANALGESIA SYRINGE INTRAVENOUS PRN
Status: DISCONTINUED | OUTPATIENT
Start: 2020-01-20 | End: 2020-01-20

## 2020-01-20 RX ORDER — AMIODARONE HYDROCHLORIDE 200 MG/1
200 TABLET ORAL DAILY
Status: DISCONTINUED | OUTPATIENT
Start: 2020-01-24 | End: 2020-01-24 | Stop reason: HOSPADM

## 2020-01-20 RX ORDER — FUROSEMIDE 10 MG/ML
20 INJECTION INTRAMUSCULAR; INTRAVENOUS
Status: ACTIVE | OUTPATIENT
Start: 2020-01-20 | End: 2020-01-20

## 2020-01-20 RX ORDER — CEFAZOLIN SODIUM 2 G/100ML
2 INJECTION, SOLUTION INTRAVENOUS ONCE
Status: COMPLETED | OUTPATIENT
Start: 2020-01-20 | End: 2020-01-20

## 2020-01-20 RX ORDER — PHENYLEPHRINE HYDROCHLORIDE 10 MG/ML
INJECTION INTRAVENOUS PRN
Status: DISCONTINUED | OUTPATIENT
Start: 2020-01-20 | End: 2020-01-20 | Stop reason: SDUPTHER

## 2020-01-20 RX ORDER — BISACODYL 10 MG
10 SUPPOSITORY, RECTAL RECTAL DAILY PRN
Status: DISCONTINUED | OUTPATIENT
Start: 2020-01-20 | End: 2020-01-24 | Stop reason: HOSPADM

## 2020-01-20 RX ORDER — ACETAMINOPHEN 325 MG/1
650 TABLET ORAL EVERY 4 HOURS PRN
Status: DISCONTINUED | OUTPATIENT
Start: 2020-01-20 | End: 2020-01-24 | Stop reason: HOSPADM

## 2020-01-20 RX ORDER — HYDROCODONE BITARTRATE AND ACETAMINOPHEN 5; 325 MG/1; MG/1
2 TABLET ORAL EVERY 4 HOURS PRN
Status: DISCONTINUED | OUTPATIENT
Start: 2020-01-20 | End: 2020-01-24 | Stop reason: HOSPADM

## 2020-01-20 RX ORDER — FENTANYL CITRATE 50 UG/ML
25 INJECTION, SOLUTION INTRAMUSCULAR; INTRAVENOUS
Status: DISCONTINUED | OUTPATIENT
Start: 2020-01-20 | End: 2020-01-20

## 2020-01-20 RX ORDER — ALBUMIN, HUMAN INJ 5% 5 %
12.5 SOLUTION INTRAVENOUS ONCE
Status: DISCONTINUED | OUTPATIENT
Start: 2020-01-20 | End: 2020-01-20 | Stop reason: SDUPTHER

## 2020-01-20 RX ORDER — HYDROCODONE BITARTRATE AND ACETAMINOPHEN 5; 325 MG/1; MG/1
1 TABLET ORAL EVERY 4 HOURS PRN
Status: DISCONTINUED | OUTPATIENT
Start: 2020-01-20 | End: 2020-01-24 | Stop reason: HOSPADM

## 2020-01-20 RX ORDER — ATORVASTATIN CALCIUM 20 MG/1
20 TABLET, FILM COATED ORAL NIGHTLY
Status: DISCONTINUED | OUTPATIENT
Start: 2020-01-21 | End: 2020-01-24 | Stop reason: HOSPADM

## 2020-01-20 RX ORDER — M-VIT,TX,IRON,MINS/CALC/FOLIC 27MG-0.4MG
1 TABLET ORAL
Status: DISCONTINUED | OUTPATIENT
Start: 2020-01-21 | End: 2020-01-24 | Stop reason: HOSPADM

## 2020-01-20 RX ORDER — SUFENTANIL CITRATE 50 UG/ML
INJECTION EPIDURAL; INTRAVENOUS PRN
Status: DISCONTINUED | OUTPATIENT
Start: 2020-01-20 | End: 2020-01-20 | Stop reason: SDUPTHER

## 2020-01-20 RX ORDER — ALBUMIN, HUMAN INJ 5% 5 %
25 SOLUTION INTRAVENOUS PRN
Status: DISCONTINUED | OUTPATIENT
Start: 2020-01-20 | End: 2020-01-24 | Stop reason: HOSPADM

## 2020-01-20 RX ORDER — DEXAMETHASONE SODIUM PHOSPHATE 4 MG/ML
INJECTION, SOLUTION INTRA-ARTICULAR; INTRALESIONAL; INTRAMUSCULAR; INTRAVENOUS; SOFT TISSUE PRN
Status: DISCONTINUED | OUTPATIENT
Start: 2020-01-20 | End: 2020-01-20 | Stop reason: SDUPTHER

## 2020-01-20 RX ORDER — ONDANSETRON 2 MG/ML
4 INJECTION INTRAMUSCULAR; INTRAVENOUS EVERY 8 HOURS PRN
Status: DISCONTINUED | OUTPATIENT
Start: 2020-01-20 | End: 2020-01-24 | Stop reason: HOSPADM

## 2020-01-20 RX ORDER — CARVEDILOL 3.12 MG/1
3.12 TABLET ORAL ONCE
Status: COMPLETED | OUTPATIENT
Start: 2020-01-20 | End: 2020-01-20

## 2020-01-20 RX ORDER — POTASSIUM CHLORIDE 20MEQ/15ML
LIQUID (ML) ORAL PRN
Status: DISCONTINUED | OUTPATIENT
Start: 2020-01-20 | End: 2020-01-20 | Stop reason: SDUPTHER

## 2020-01-20 RX ORDER — SIMVASTATIN 40 MG
40 TABLET ORAL ONCE
Status: COMPLETED | OUTPATIENT
Start: 2020-01-20 | End: 2020-01-20

## 2020-01-20 RX ORDER — CALCIUM CHLORIDE 100 MG/ML
INJECTION INTRAVENOUS; INTRAVENTRICULAR PRN
Status: DISCONTINUED | OUTPATIENT
Start: 2020-01-20 | End: 2020-01-20 | Stop reason: SDUPTHER

## 2020-01-20 RX ORDER — HYDRALAZINE HYDROCHLORIDE 20 MG/ML
5 INJECTION INTRAMUSCULAR; INTRAVENOUS EVERY 5 MIN PRN
Status: DISCONTINUED | OUTPATIENT
Start: 2020-01-20 | End: 2020-01-24 | Stop reason: HOSPADM

## 2020-01-20 RX ORDER — PROCHLORPERAZINE EDISYLATE 5 MG/ML
10 INJECTION INTRAMUSCULAR; INTRAVENOUS EVERY 6 HOURS PRN
Status: DISCONTINUED | OUTPATIENT
Start: 2020-01-20 | End: 2020-01-24 | Stop reason: HOSPADM

## 2020-01-20 RX ORDER — PROTAMINE SULFATE 10 MG/ML
INJECTION, SOLUTION INTRAVENOUS PRN
Status: DISCONTINUED | OUTPATIENT
Start: 2020-01-20 | End: 2020-01-20 | Stop reason: SDUPTHER

## 2020-01-20 RX ORDER — DOCUSATE SODIUM 100 MG/1
100 CAPSULE, LIQUID FILLED ORAL 2 TIMES DAILY
Status: DISCONTINUED | OUTPATIENT
Start: 2020-01-20 | End: 2020-01-24 | Stop reason: HOSPADM

## 2020-01-20 RX ORDER — EPINEPHRINE 1 MG/ML
INJECTION, SOLUTION, CONCENTRATE INTRAVENOUS PRN
Status: DISCONTINUED | OUTPATIENT
Start: 2020-01-20 | End: 2020-01-20 | Stop reason: SDUPTHER

## 2020-01-20 RX ORDER — VASOPRESSIN 20 U/ML
INJECTION PARENTERAL PRN
Status: DISCONTINUED | OUTPATIENT
Start: 2020-01-20 | End: 2020-01-20 | Stop reason: SDUPTHER

## 2020-01-20 RX ORDER — DEXTROSE MONOHYDRATE 50 MG/ML
100 INJECTION, SOLUTION INTRAVENOUS PRN
Status: DISCONTINUED | OUTPATIENT
Start: 2020-01-20 | End: 2020-01-24 | Stop reason: HOSPADM

## 2020-01-20 RX ORDER — POTASSIUM CHLORIDE 29.8 MG/ML
20 INJECTION INTRAVENOUS PRN
Status: DISCONTINUED | OUTPATIENT
Start: 2020-01-20 | End: 2020-01-24 | Stop reason: HOSPADM

## 2020-01-20 RX ORDER — HEPARIN SODIUM 1000 [USP'U]/ML
INJECTION, SOLUTION INTRAVENOUS; SUBCUTANEOUS PRN
Status: DISCONTINUED | OUTPATIENT
Start: 2020-01-20 | End: 2020-01-20 | Stop reason: SDUPTHER

## 2020-01-20 RX ORDER — FENTANYL CITRATE 50 UG/ML
50 INJECTION, SOLUTION INTRAMUSCULAR; INTRAVENOUS ONCE
Status: COMPLETED | OUTPATIENT
Start: 2020-01-20 | End: 2020-01-20

## 2020-01-20 RX ADMIN — DEXTROSE MONOHYDRATE 3 G: 50 INJECTION, SOLUTION INTRAVENOUS at 19:01

## 2020-01-20 RX ADMIN — HYDROMORPHONE HYDROCHLORIDE 1 MG: 2 INJECTION INTRAMUSCULAR; INTRAVENOUS; SUBCUTANEOUS at 14:26

## 2020-01-20 RX ADMIN — VASOPRESSIN 2 UNITS: 20 INJECTION INTRAVENOUS at 14:01

## 2020-01-20 RX ADMIN — VASOPRESSIN 2 UNITS: 20 INJECTION INTRAVENOUS at 12:10

## 2020-01-20 RX ADMIN — PROCHLORPERAZINE EDISYLATE 10 MG: 5 INJECTION INTRAMUSCULAR; INTRAVENOUS at 19:16

## 2020-01-20 RX ADMIN — EPINEPHRINE 20 MCG: 1 INJECTION, SOLUTION INTRAMUSCULAR; SUBCUTANEOUS at 12:36

## 2020-01-20 RX ADMIN — SUFENTANIL CITRATE 40 MCG: 50 INJECTION EPIDURAL; INTRAVENOUS at 07:49

## 2020-01-20 RX ADMIN — HEPARIN SODIUM 40000 UNITS: 1000 INJECTION, SOLUTION INTRAVENOUS; SUBCUTANEOUS at 08:11

## 2020-01-20 RX ADMIN — EPINEPHRINE 20 MCG: 1 INJECTION, SOLUTION INTRAMUSCULAR; SUBCUTANEOUS at 12:08

## 2020-01-20 RX ADMIN — VASOPRESSIN 1 UNITS: 20 INJECTION INTRAVENOUS at 12:37

## 2020-01-20 RX ADMIN — VECURONIUM BROMIDE FOR INJECTION 2 MG: 1 INJECTION, POWDER, LYOPHILIZED, FOR SOLUTION INTRAVENOUS at 12:01

## 2020-01-20 RX ADMIN — ALBUMIN (HUMAN) 12.5 G: 12.5 INJECTION, SOLUTION INTRAVENOUS at 16:28

## 2020-01-20 RX ADMIN — DEXMEDETOMIDINE 0.2 MCG/KG/HR: 100 INJECTION, SOLUTION, CONCENTRATE INTRAVENOUS at 17:13

## 2020-01-20 RX ADMIN — SODIUM CHLORIDE, PRESERVATIVE FREE 10 ML: 5 INJECTION INTRAVENOUS at 21:30

## 2020-01-20 RX ADMIN — DIPHENHYDRAMINE HYDROCHLORIDE 25 MG: 50 INJECTION INTRAMUSCULAR; INTRAVENOUS at 11:44

## 2020-01-20 RX ADMIN — EPINEPHRINE 5 MCG/MIN: 1 INJECTION, SOLUTION, CONCENTRATE INTRAVENOUS at 11:00

## 2020-01-20 RX ADMIN — VASOPRESSIN 2 UNITS: 20 INJECTION INTRAVENOUS at 13:55

## 2020-01-20 RX ADMIN — PROTAMINE SULFATE 200 MG: 10 INJECTION, SOLUTION INTRAVENOUS at 11:46

## 2020-01-20 RX ADMIN — POTASSIUM CHLORIDE 40 MEQ: 1.5 SOLUTION ORAL at 14:35

## 2020-01-20 RX ADMIN — PHENYLEPHRINE HYDROCHLORIDE 100 MCG: 10 INJECTION INTRAVENOUS at 07:45

## 2020-01-20 RX ADMIN — VASOPRESSIN 0.03 UNITS/MIN: 20 INJECTION INTRAVENOUS at 21:19

## 2020-01-20 RX ADMIN — SODIUM CHLORIDE, POTASSIUM CHLORIDE, SODIUM LACTATE AND CALCIUM CHLORIDE: 600; 310; 30; 20 INJECTION, SOLUTION INTRAVENOUS at 06:54

## 2020-01-20 RX ADMIN — PHENYLEPHRINE HYDROCHLORIDE 100 MCG: 10 INJECTION INTRAVENOUS at 11:47

## 2020-01-20 RX ADMIN — PROPOFOL 80 MG: 10 INJECTION, EMULSION INTRAVENOUS at 07:07

## 2020-01-20 RX ADMIN — AMINOCAPROIC ACID 1 G: 250 INJECTION, SOLUTION INTRAVENOUS at 09:00

## 2020-01-20 RX ADMIN — Medication 50 MEQ: at 11:48

## 2020-01-20 RX ADMIN — Medication 50 MEQ: at 12:45

## 2020-01-20 RX ADMIN — VASOPRESSIN 2 UNITS: 20 INJECTION INTRAVENOUS at 13:15

## 2020-01-20 RX ADMIN — PHENYLEPHRINE HYDROCHLORIDE 100 MCG: 10 INJECTION INTRAVENOUS at 08:21

## 2020-01-20 RX ADMIN — VECURONIUM BROMIDE FOR INJECTION 5 MG: 1 INJECTION, POWDER, LYOPHILIZED, FOR SOLUTION INTRAVENOUS at 08:23

## 2020-01-20 RX ADMIN — FENTANYL CITRATE 50 MCG: 50 INJECTION, SOLUTION INTRAMUSCULAR; INTRAVENOUS at 15:26

## 2020-01-20 RX ADMIN — ALBUMIN (HUMAN) 250 ML: 12.5 INJECTION, SOLUTION INTRAVENOUS at 08:07

## 2020-01-20 RX ADMIN — PHENYLEPHRINE HYDROCHLORIDE 100 MCG: 10 INJECTION INTRAVENOUS at 08:01

## 2020-01-20 RX ADMIN — EPINEPHRINE 10 MCG: 1 INJECTION, SOLUTION INTRAMUSCULAR; SUBCUTANEOUS at 07:59

## 2020-01-20 RX ADMIN — VASOPRESSIN 2 UNITS: 20 INJECTION INTRAVENOUS at 11:51

## 2020-01-20 RX ADMIN — EPINEPHRINE 20 MCG: 1 INJECTION, SOLUTION INTRAMUSCULAR; SUBCUTANEOUS at 12:30

## 2020-01-20 RX ADMIN — ALBUMIN (HUMAN) 250 ML: 12.5 INJECTION, SOLUTION INTRAVENOUS at 07:55

## 2020-01-20 RX ADMIN — PHENYLEPHRINE HYDROCHLORIDE 100 MCG: 10 INJECTION INTRAVENOUS at 11:27

## 2020-01-20 RX ADMIN — CEFAZOLIN SODIUM 2 G: 2 INJECTION, SOLUTION INTRAVENOUS at 11:15

## 2020-01-20 RX ADMIN — SUFENTANIL CITRATE 20 MCG: 50 INJECTION EPIDURAL; INTRAVENOUS at 07:28

## 2020-01-20 RX ADMIN — ALBUMIN (HUMAN) 12.5 G: 12.5 INJECTION, SOLUTION INTRAVENOUS at 19:33

## 2020-01-20 RX ADMIN — MIDAZOLAM 4 MG: 1 INJECTION INTRAMUSCULAR; INTRAVENOUS at 07:09

## 2020-01-20 RX ADMIN — CALCIUM CHLORIDE 1 G: 100 INJECTION, SOLUTION INTRAVENOUS; INTRAVENTRICULAR at 12:57

## 2020-01-20 RX ADMIN — PHENYLEPHRINE HYDROCHLORIDE 100 MCG: 10 INJECTION INTRAVENOUS at 07:35

## 2020-01-20 RX ADMIN — ACETAMINOPHEN 1000 MG: 10 INJECTION, SOLUTION INTRAVENOUS at 17:09

## 2020-01-20 RX ADMIN — PHENYLEPHRINE HYDROCHLORIDE 100 MCG: 10 INJECTION INTRAVENOUS at 08:07

## 2020-01-20 RX ADMIN — Medication: at 21:19

## 2020-01-20 RX ADMIN — PHENYLEPHRINE HYDROCHLORIDE 100 MCG: 10 INJECTION INTRAVENOUS at 07:25

## 2020-01-20 RX ADMIN — AMIODARONE HYDROCHLORIDE 200 MG: 200 TABLET ORAL at 21:19

## 2020-01-20 RX ADMIN — PROPOFOL 50 MG: 10 INJECTION, EMULSION INTRAVENOUS at 14:27

## 2020-01-20 RX ADMIN — EPINEPHRINE 20 MCG: 1 INJECTION, SOLUTION INTRAMUSCULAR; SUBCUTANEOUS at 13:27

## 2020-01-20 RX ADMIN — VASOPRESSIN 2 UNITS: 20 INJECTION INTRAVENOUS at 14:06

## 2020-01-20 RX ADMIN — NOREPINEPHRINE BITARTRATE 5 MCG/MIN: 1 INJECTION, SOLUTION, CONCENTRATE INTRAVENOUS at 11:25

## 2020-01-20 RX ADMIN — PROTAMINE SULFATE 200 MG: 10 INJECTION, SOLUTION INTRAVENOUS at 11:51

## 2020-01-20 RX ADMIN — AMINOCAPROIC ACID 1 G: 250 INJECTION, SOLUTION INTRAVENOUS at 08:10

## 2020-01-20 RX ADMIN — EPINEPHRINE 20 MCG: 1 INJECTION, SOLUTION INTRAMUSCULAR; SUBCUTANEOUS at 12:05

## 2020-01-20 RX ADMIN — HYDROMORPHONE HYDROCHLORIDE 0.4 MG: 2 INJECTION INTRAMUSCULAR; INTRAVENOUS; SUBCUTANEOUS at 14:00

## 2020-01-20 RX ADMIN — PHENYLEPHRINE HYDROCHLORIDE 100 MCG: 10 INJECTION INTRAVENOUS at 08:14

## 2020-01-20 RX ADMIN — ONDANSETRON 4 MG: 2 INJECTION INTRAMUSCULAR; INTRAVENOUS at 07:07

## 2020-01-20 RX ADMIN — Medication 50 MEQ: at 14:01

## 2020-01-20 RX ADMIN — PROPOFOL 40 MG: 10 INJECTION, EMULSION INTRAVENOUS at 07:11

## 2020-01-20 RX ADMIN — SODIUM CHLORIDE 6 UNITS/HR: 9 INJECTION, SOLUTION INTRAVENOUS at 13:32

## 2020-01-20 RX ADMIN — SODIUM CHLORIDE, POTASSIUM CHLORIDE, SODIUM LACTATE AND CALCIUM CHLORIDE: 600; 310; 30; 20 INJECTION, SOLUTION INTRAVENOUS at 13:00

## 2020-01-20 RX ADMIN — SUFENTANIL CITRATE 20 MCG: 50 INJECTION EPIDURAL; INTRAVENOUS at 07:40

## 2020-01-20 RX ADMIN — VASOPRESSIN 1 UNITS: 20 INJECTION INTRAVENOUS at 11:59

## 2020-01-20 RX ADMIN — PHENYLEPHRINE HYDROCHLORIDE 100 MCG: 10 INJECTION INTRAVENOUS at 07:56

## 2020-01-20 RX ADMIN — VASOPRESSIN 0.04 UNITS/MIN: 20 INJECTION INTRAVENOUS at 13:45

## 2020-01-20 RX ADMIN — FENTANYL CITRATE 25 MCG: 50 INJECTION INTRAMUSCULAR; INTRAVENOUS at 15:06

## 2020-01-20 RX ADMIN — PHENYLEPHRINE HYDROCHLORIDE 100 MCG: 10 INJECTION INTRAVENOUS at 07:09

## 2020-01-20 RX ADMIN — MONTELUKAST 10 MG: 10 TABLET, FILM COATED ORAL at 21:19

## 2020-01-20 RX ADMIN — DOCUSATE SODIUM 100 MG: 100 CAPSULE, LIQUID FILLED ORAL at 21:19

## 2020-01-20 RX ADMIN — EPINEPHRINE 20 MCG: 1 INJECTION, SOLUTION INTRAMUSCULAR; SUBCUTANEOUS at 11:41

## 2020-01-20 RX ADMIN — ALBUMIN (HUMAN) 250 ML: 12.5 INJECTION, SOLUTION INTRAVENOUS at 14:01

## 2020-01-20 RX ADMIN — PHENYLEPHRINE HYDROCHLORIDE 100 MCG: 10 INJECTION INTRAVENOUS at 08:18

## 2020-01-20 RX ADMIN — SODIUM CHLORIDE 4.3 UNITS/HR: 9 INJECTION, SOLUTION INTRAVENOUS at 22:31

## 2020-01-20 RX ADMIN — SODIUM CHLORIDE, POTASSIUM CHLORIDE, SODIUM LACTATE AND CALCIUM CHLORIDE: 600; 310; 30; 20 INJECTION, SOLUTION INTRAVENOUS at 12:00

## 2020-01-20 RX ADMIN — PHENYLEPHRINE HYDROCHLORIDE 100 MCG: 10 INJECTION INTRAVENOUS at 11:20

## 2020-01-20 RX ADMIN — ONDANSETRON 4 MG: 2 INJECTION INTRAMUSCULAR; INTRAVENOUS at 16:25

## 2020-01-20 RX ADMIN — PHENYLEPHRINE HYDROCHLORIDE 100 MCG: 10 INJECTION INTRAVENOUS at 11:39

## 2020-01-20 RX ADMIN — VASOPRESSIN 2 UNITS: 20 INJECTION INTRAVENOUS at 13:49

## 2020-01-20 RX ADMIN — EPINEPHRINE 10 MCG: 1 INJECTION, SOLUTION INTRAMUSCULAR; SUBCUTANEOUS at 07:46

## 2020-01-20 RX ADMIN — Medication: at 06:46

## 2020-01-20 RX ADMIN — EPINEPHRINE 10 MCG: 1 INJECTION, SOLUTION INTRAMUSCULAR; SUBCUTANEOUS at 07:53

## 2020-01-20 RX ADMIN — PROPOFOL 80 MG: 10 INJECTION, EMULSION INTRAVENOUS at 14:23

## 2020-01-20 RX ADMIN — ACETAMINOPHEN 1000 MG: 10 INJECTION, SOLUTION INTRAVENOUS at 21:19

## 2020-01-20 RX ADMIN — EPINEPHRINE 20 MCG: 1 INJECTION, SOLUTION INTRAMUSCULAR; SUBCUTANEOUS at 13:38

## 2020-01-20 RX ADMIN — Medication 200 MG: at 07:07

## 2020-01-20 RX ADMIN — CHLORHEXIDINE GLUCONATE 0.12% ORAL RINSE 30 ML: 1.2 LIQUID ORAL at 06:42

## 2020-01-20 RX ADMIN — SUFENTANIL CITRATE 20 MCG: 50 INJECTION EPIDURAL; INTRAVENOUS at 07:09

## 2020-01-20 RX ADMIN — CARVEDILOL 3.12 MG: 3.12 TABLET, FILM COATED ORAL at 06:42

## 2020-01-20 RX ADMIN — VECURONIUM BROMIDE FOR INJECTION 4 MG: 1 INJECTION, POWDER, LYOPHILIZED, FOR SOLUTION INTRAVENOUS at 07:13

## 2020-01-20 RX ADMIN — EPINEPHRINE 20 MCG: 1 INJECTION, SOLUTION INTRAMUSCULAR; SUBCUTANEOUS at 11:45

## 2020-01-20 RX ADMIN — SODIUM CHLORIDE, POTASSIUM CHLORIDE, SODIUM LACTATE AND CALCIUM CHLORIDE: 600; 310; 30; 20 INJECTION, SOLUTION INTRAVENOUS at 08:09

## 2020-01-20 RX ADMIN — CALCIUM CHLORIDE 1 G: 100 INJECTION, SOLUTION INTRAVENOUS; INTRAVENTRICULAR at 11:48

## 2020-01-20 RX ADMIN — AMINOCAPROIC ACID 1 G: 250 INJECTION, SOLUTION INTRAVENOUS at 10:00

## 2020-01-20 RX ADMIN — DEXAMETHASONE SODIUM PHOSPHATE 8 MG: 4 INJECTION, SOLUTION INTRAMUSCULAR; INTRAVENOUS at 07:07

## 2020-01-20 RX ADMIN — IPRATROPIUM BROMIDE AND ALBUTEROL SULFATE 1 AMPULE: .5; 3 SOLUTION RESPIRATORY (INHALATION) at 19:26

## 2020-01-20 RX ADMIN — SODIUM CHLORIDE: 4.5 INJECTION, SOLUTION INTRAVENOUS at 16:09

## 2020-01-20 RX ADMIN — ALBUMIN (HUMAN) 12.5 G: 12.5 INJECTION, SOLUTION INTRAVENOUS at 16:09

## 2020-01-20 RX ADMIN — ALBUMIN (HUMAN) 250 ML: 12.5 INJECTION, SOLUTION INTRAVENOUS at 14:25

## 2020-01-20 RX ADMIN — AMINOCAPROIC ACID 1 G: 250 INJECTION, SOLUTION INTRAVENOUS at 11:00

## 2020-01-20 RX ADMIN — VECURONIUM BROMIDE FOR INJECTION 3 MG: 1 INJECTION, POWDER, LYOPHILIZED, FOR SOLUTION INTRAVENOUS at 10:15

## 2020-01-20 RX ADMIN — MIDAZOLAM 2 MG: 1 INJECTION INTRAMUSCULAR; INTRAVENOUS at 08:23

## 2020-01-20 RX ADMIN — VECURONIUM BROMIDE FOR INJECTION 6 MG: 1 INJECTION, POWDER, LYOPHILIZED, FOR SOLUTION INTRAVENOUS at 07:45

## 2020-01-20 RX ADMIN — VASOPRESSIN 2 UNITS: 20 INJECTION INTRAVENOUS at 12:16

## 2020-01-20 RX ADMIN — PHENYLEPHRINE HYDROCHLORIDE 100 MCG: 10 INJECTION INTRAVENOUS at 11:54

## 2020-01-20 RX ADMIN — CEFAZOLIN SODIUM 2 G: 2 INJECTION, SOLUTION INTRAVENOUS at 07:15

## 2020-01-20 RX ADMIN — EPINEPHRINE 10 MCG: 1 INJECTION, SOLUTION INTRAMUSCULAR; SUBCUTANEOUS at 08:06

## 2020-01-20 RX ADMIN — VASOPRESSIN 2 UNITS: 20 INJECTION INTRAVENOUS at 12:47

## 2020-01-20 RX ADMIN — AMINOCAPROIC ACID 1 G: 250 INJECTION, SOLUTION INTRAVENOUS at 12:00

## 2020-01-20 RX ADMIN — HYDROMORPHONE HYDROCHLORIDE 0.6 MG: 2 INJECTION INTRAMUSCULAR; INTRAVENOUS; SUBCUTANEOUS at 12:21

## 2020-01-20 RX ADMIN — AMINOCAPROIC ACID 5 G: 250 INJECTION, SOLUTION INTRAVENOUS at 07:40

## 2020-01-20 RX ADMIN — Medication 50 MEQ: at 14:26

## 2020-01-20 RX ADMIN — SIMVASTATIN 40 MG: 40 TABLET, FILM COATED ORAL at 06:42

## 2020-01-20 ASSESSMENT — PULMONARY FUNCTION TESTS
PIF_VALUE: 22
PIF_VALUE: 22
PIF_VALUE: 17
PIF_VALUE: 18
PIF_VALUE: -8
PIF_VALUE: 22
PIF_VALUE: 27
PIF_VALUE: 22
PIF_VALUE: 23
PIF_VALUE: 20
PIF_VALUE: 21
PIF_VALUE: 17
PIF_VALUE: 0
PIF_VALUE: 20
PIF_VALUE: -8
PIF_VALUE: -7
PIF_VALUE: 21
PIF_VALUE: 0
PIF_VALUE: -7
PIF_VALUE: 16
PIF_VALUE: 17
PIF_VALUE: 21
PIF_VALUE: 23
PIF_VALUE: 22
PIF_VALUE: 16
PIF_VALUE: 1
PIF_VALUE: -7
PIF_VALUE: 0
PIF_VALUE: 18
PIF_VALUE: 19
PIF_VALUE: 23
PIF_VALUE: -6
PIF_VALUE: 21
PIF_VALUE: 20
PIF_VALUE: 16
PIF_VALUE: 22
PIF_VALUE: -6
PIF_VALUE: -7
PIF_VALUE: 18
PIF_VALUE: 20
PIF_VALUE: -8
PIF_VALUE: 21
PIF_VALUE: 16
PIF_VALUE: -8
PIF_VALUE: 21
PIF_VALUE: 29
PIF_VALUE: -7
PIF_VALUE: 22
PIF_VALUE: 0
PIF_VALUE: 16
PIF_VALUE: 28
PIF_VALUE: 0
PIF_VALUE: 21
PIF_VALUE: -8
PIF_VALUE: -8
PIF_VALUE: 16
PIF_VALUE: 4
PIF_VALUE: 0
PIF_VALUE: 21
PIF_VALUE: 0
PIF_VALUE: 21
PIF_VALUE: 16
PIF_VALUE: 1
PIF_VALUE: 23
PIF_VALUE: 1
PIF_VALUE: -8
PIF_VALUE: 0
PIF_VALUE: 3
PIF_VALUE: -6
PIF_VALUE: 20
PIF_VALUE: -8
PIF_VALUE: 1
PIF_VALUE: 18
PIF_VALUE: 22
PIF_VALUE: 23
PIF_VALUE: 23
PIF_VALUE: -6
PIF_VALUE: 0
PIF_VALUE: -8
PIF_VALUE: 22
PIF_VALUE: 22
PIF_VALUE: 20
PIF_VALUE: 25
PIF_VALUE: 24
PIF_VALUE: 22
PIF_VALUE: 21
PIF_VALUE: -7
PIF_VALUE: 1
PIF_VALUE: 23
PIF_VALUE: 0
PIF_VALUE: 0
PIF_VALUE: 23
PIF_VALUE: 22
PIF_VALUE: 22
PIF_VALUE: 28
PIF_VALUE: 16
PIF_VALUE: 21
PIF_VALUE: -1
PIF_VALUE: 19
PIF_VALUE: -7
PIF_VALUE: 18
PIF_VALUE: -8
PIF_VALUE: 18
PIF_VALUE: 29
PIF_VALUE: 20
PIF_VALUE: -7
PIF_VALUE: -6
PIF_VALUE: 25
PIF_VALUE: 23
PIF_VALUE: 21
PIF_VALUE: 18
PIF_VALUE: 21
PIF_VALUE: 22
PIF_VALUE: 0
PIF_VALUE: 22
PIF_VALUE: 22
PIF_VALUE: 0
PIF_VALUE: -8
PIF_VALUE: -7
PIF_VALUE: 20
PIF_VALUE: 22
PIF_VALUE: 21
PIF_VALUE: 17
PIF_VALUE: 19
PIF_VALUE: 22
PIF_VALUE: -7
PIF_VALUE: 0
PIF_VALUE: 21
PIF_VALUE: -3
PIF_VALUE: 19
PIF_VALUE: 23
PIF_VALUE: 22
PIF_VALUE: -8
PIF_VALUE: -5
PIF_VALUE: -8
PIF_VALUE: 15
PIF_VALUE: -7
PIF_VALUE: -7
PIF_VALUE: 21
PIF_VALUE: 0
PIF_VALUE: 24
PIF_VALUE: -7
PIF_VALUE: -6
PIF_VALUE: 25
PIF_VALUE: -8
PIF_VALUE: 22
PIF_VALUE: 0
PIF_VALUE: 1
PIF_VALUE: 0
PIF_VALUE: 29
PIF_VALUE: 7
PIF_VALUE: -8
PIF_VALUE: 19
PIF_VALUE: 18
PIF_VALUE: 20
PIF_VALUE: 22
PIF_VALUE: 22
PIF_VALUE: 0
PIF_VALUE: 22
PIF_VALUE: 24
PIF_VALUE: 28
PIF_VALUE: 22
PIF_VALUE: 0
PIF_VALUE: 19
PIF_VALUE: 3
PIF_VALUE: 19
PIF_VALUE: 17
PIF_VALUE: 0
PIF_VALUE: 19
PIF_VALUE: 21
PIF_VALUE: 22
PIF_VALUE: 0
PIF_VALUE: 23
PIF_VALUE: -7
PIF_VALUE: 24
PIF_VALUE: 18
PIF_VALUE: 22
PIF_VALUE: 16
PIF_VALUE: 16
PIF_VALUE: 17
PIF_VALUE: 18
PIF_VALUE: -7
PIF_VALUE: -8
PIF_VALUE: 19
PIF_VALUE: 19
PIF_VALUE: 21
PIF_VALUE: 19
PIF_VALUE: -6
PIF_VALUE: 16
PIF_VALUE: -7
PIF_VALUE: 21
PIF_VALUE: 22
PIF_VALUE: -3
PIF_VALUE: 21
PIF_VALUE: 0
PIF_VALUE: -7
PIF_VALUE: -6
PIF_VALUE: 24
PIF_VALUE: 21
PIF_VALUE: -7
PIF_VALUE: 22
PIF_VALUE: 21
PIF_VALUE: 21
PIF_VALUE: 22
PIF_VALUE: -8
PIF_VALUE: 21
PIF_VALUE: -7
PIF_VALUE: 23
PIF_VALUE: 0
PIF_VALUE: 19
PIF_VALUE: 16
PIF_VALUE: -8
PIF_VALUE: 22
PIF_VALUE: -7
PIF_VALUE: 16
PIF_VALUE: 24
PIF_VALUE: 26
PIF_VALUE: 22
PIF_VALUE: -7
PIF_VALUE: 22
PIF_VALUE: 21
PIF_VALUE: -7
PIF_VALUE: 19
PIF_VALUE: 18
PIF_VALUE: 21
PIF_VALUE: -7
PIF_VALUE: 21
PIF_VALUE: -8
PIF_VALUE: 19
PIF_VALUE: 0
PIF_VALUE: 22
PIF_VALUE: -4
PIF_VALUE: 17
PIF_VALUE: 22
PIF_VALUE: 0
PIF_VALUE: 0
PIF_VALUE: 30
PIF_VALUE: 21
PIF_VALUE: 0
PIF_VALUE: 23
PIF_VALUE: -7
PIF_VALUE: 18
PIF_VALUE: 16
PIF_VALUE: 19
PIF_VALUE: -7
PIF_VALUE: 20
PIF_VALUE: 21
PIF_VALUE: 1
PIF_VALUE: 19
PIF_VALUE: 22
PIF_VALUE: 22
PIF_VALUE: -6
PIF_VALUE: 17
PIF_VALUE: -8
PIF_VALUE: 22
PIF_VALUE: -6
PIF_VALUE: 22
PIF_VALUE: 3
PIF_VALUE: 20
PIF_VALUE: -6
PIF_VALUE: 20
PIF_VALUE: 20
PIF_VALUE: 18
PIF_VALUE: 33
PIF_VALUE: 22
PIF_VALUE: -7
PIF_VALUE: 0
PIF_VALUE: 0
PIF_VALUE: -8
PIF_VALUE: -7
PIF_VALUE: 0
PIF_VALUE: 18
PIF_VALUE: 0
PIF_VALUE: 21
PIF_VALUE: -8
PIF_VALUE: 24
PIF_VALUE: 20
PIF_VALUE: 19
PIF_VALUE: -7
PIF_VALUE: -8
PIF_VALUE: 18
PIF_VALUE: 27
PIF_VALUE: -3
PIF_VALUE: 30
PIF_VALUE: 20
PIF_VALUE: 18
PIF_VALUE: -7
PIF_VALUE: 21
PIF_VALUE: 0
PIF_VALUE: 0
PIF_VALUE: 15
PIF_VALUE: 21
PIF_VALUE: 0
PIF_VALUE: 26
PIF_VALUE: -8
PIF_VALUE: 22
PIF_VALUE: 16
PIF_VALUE: 22
PIF_VALUE: 25
PIF_VALUE: 0
PIF_VALUE: 11
PIF_VALUE: 24
PIF_VALUE: -8
PIF_VALUE: 21
PIF_VALUE: -7
PIF_VALUE: 21
PIF_VALUE: 25
PIF_VALUE: 21
PIF_VALUE: 0
PIF_VALUE: 21
PIF_VALUE: -7
PIF_VALUE: -7
PIF_VALUE: 20
PIF_VALUE: 22
PIF_VALUE: 18
PIF_VALUE: 17
PIF_VALUE: 22
PIF_VALUE: 22
PIF_VALUE: -4
PIF_VALUE: 8
PIF_VALUE: 22
PIF_VALUE: 19
PIF_VALUE: -7
PIF_VALUE: 22
PIF_VALUE: -8
PIF_VALUE: 15
PIF_VALUE: 19
PIF_VALUE: 16
PIF_VALUE: 1
PIF_VALUE: 22
PIF_VALUE: 22
PIF_VALUE: 21
PIF_VALUE: -4
PIF_VALUE: 18
PIF_VALUE: 24
PIF_VALUE: 0
PIF_VALUE: 1
PIF_VALUE: 19
PIF_VALUE: 1
PIF_VALUE: 29
PIF_VALUE: 27
PIF_VALUE: 18
PIF_VALUE: 21
PIF_VALUE: 0
PIF_VALUE: 0
PIF_VALUE: 21
PIF_VALUE: -8
PIF_VALUE: 19
PIF_VALUE: 23
PIF_VALUE: -7
PIF_VALUE: 0
PIF_VALUE: -3
PIF_VALUE: 22
PIF_VALUE: 21
PIF_VALUE: 0
PIF_VALUE: -7
PIF_VALUE: -7
PIF_VALUE: -8
PIF_VALUE: 17
PIF_VALUE: 22
PIF_VALUE: 0
PIF_VALUE: 23
PIF_VALUE: 16
PIF_VALUE: 1
PIF_VALUE: 15
PIF_VALUE: 0
PIF_VALUE: 0
PIF_VALUE: 1
PIF_VALUE: 21
PIF_VALUE: 1
PIF_VALUE: -8
PIF_VALUE: 0
PIF_VALUE: 22
PIF_VALUE: 22
PIF_VALUE: -7
PIF_VALUE: 20
PIF_VALUE: 15
PIF_VALUE: 19
PIF_VALUE: -7
PIF_VALUE: -8
PIF_VALUE: 21
PIF_VALUE: 22
PIF_VALUE: 1
PIF_VALUE: 1
PIF_VALUE: 18
PIF_VALUE: 21
PIF_VALUE: 21
PIF_VALUE: -1
PIF_VALUE: 0
PIF_VALUE: -7
PIF_VALUE: 22
PIF_VALUE: 20
PIF_VALUE: -8
PIF_VALUE: 21
PIF_VALUE: 24
PIF_VALUE: 23
PIF_VALUE: 24
PIF_VALUE: 18
PIF_VALUE: 5
PIF_VALUE: 22
PIF_VALUE: 18
PIF_VALUE: 22
PIF_VALUE: 9
PIF_VALUE: 24
PIF_VALUE: -8
PIF_VALUE: -6
PIF_VALUE: 5
PIF_VALUE: 22
PIF_VALUE: 17
PIF_VALUE: 24
PIF_VALUE: 19
PIF_VALUE: -6
PIF_VALUE: 19
PIF_VALUE: 17
PIF_VALUE: -4
PIF_VALUE: 28
PIF_VALUE: 18
PIF_VALUE: 16
PIF_VALUE: 1
PIF_VALUE: 21
PIF_VALUE: 21
PIF_VALUE: 18
PIF_VALUE: 15
PIF_VALUE: 0
PIF_VALUE: 21
PIF_VALUE: 0
PIF_VALUE: 1
PIF_VALUE: 21
PIF_VALUE: 16
PIF_VALUE: 19
PIF_VALUE: 16
PIF_VALUE: 25
PIF_VALUE: 40
PIF_VALUE: -8
PIF_VALUE: 0
PIF_VALUE: 22
PIF_VALUE: 25
PIF_VALUE: 27
PIF_VALUE: 16
PIF_VALUE: 18
PIF_VALUE: 28
PIF_VALUE: 20
PIF_VALUE: 0
PIF_VALUE: -7
PIF_VALUE: -6
PIF_VALUE: 21
PIF_VALUE: -8
PIF_VALUE: 21
PIF_VALUE: 39
PIF_VALUE: 21
PIF_VALUE: 20
PIF_VALUE: -7
PIF_VALUE: -4
PIF_VALUE: 12
PIF_VALUE: 22
PIF_VALUE: 0
PIF_VALUE: 17
PIF_VALUE: 20
PIF_VALUE: 1
PIF_VALUE: 26
PIF_VALUE: 0
PIF_VALUE: 0
PIF_VALUE: 18
PIF_VALUE: 21
PIF_VALUE: -2
PIF_VALUE: 19
PIF_VALUE: 22
PIF_VALUE: -8
PIF_VALUE: 22
PIF_VALUE: 22
PIF_VALUE: 27
PIF_VALUE: -7
PIF_VALUE: 22
PIF_VALUE: 22
PIF_VALUE: -7
PIF_VALUE: 1
PIF_VALUE: 19
PIF_VALUE: 22
PIF_VALUE: 21
PIF_VALUE: -8
PIF_VALUE: 16
PIF_VALUE: 3

## 2020-01-20 ASSESSMENT — LIFESTYLE VARIABLES: SMOKING_STATUS: 1

## 2020-01-20 ASSESSMENT — PAIN SCALES - GENERAL: PAINLEVEL_OUTOF10: 7

## 2020-01-20 NOTE — ANESTHESIA POSTPROCEDURE EVALUATION
Department of Anesthesiology  Postprocedure Note    Patient: Андрей Mas  MRN: [id number]  YOB: 1960  Date of evaluation: 1/20/2020  Time:  3:06 PM     Procedure Summary     Date:  01/20/20 Room / Location:  [address]    Anesthesia Start:  0156 Anesthesia Stop:  5557    Procedures:       THORACIC ASCENDING AORTIC ANEURYSM REPAIR (N/A Chest)      MINIMALLY INVASIVE AORTIC VALVE REPLACEMENT, INTRAOPERATIVE RAY, INDUCED HYPOTHERMIA (N/A Chest) Diagnosis:  (AORTIC VALVE DISEASE)    Surgeon:  Pippa London MD Responsible Provider:  Eloise Canavan, MD    Anesthesia Type:  general ASA Status:  3          Anesthesia Type: general    Kylie Phase I:      Kylie Phase II:      Last vitals: Reviewed and per EMR flowsheets.        Anesthesia Post Evaluation    Patient location during evaluation: ICU  Patient participation: complete - patient cannot participate  Level of consciousness: sedated and ventilated  Pain score: 0  Airway patency: patent  Nausea & Vomiting: no nausea and no vomiting  Complications: no  Cardiovascular status: hemodynamically stable and vasoactive/inotropes  Respiratory status: acceptable and ventilator  Hydration status: euvolemic

## 2020-01-20 NOTE — FLOWSHEET NOTE
Security code #[id number] given to patient's wife and son. Updated on visitation policy.      Anthony Baumann RN 5:40 PM

## 2020-01-20 NOTE — PROGRESS NOTES
Patient extubated at 18:25 pm, Lina RT. Mouth care provided, CO2 nasal canula placed on patient 3 liters oxygen.

## 2020-01-20 NOTE — PROGRESS NOTES
Dr. Daiana Jimenez at patient bedside, updated on hemodynamics at 16:00 pm along with ABG results. New orders start Precedex drip for agitation and restlessness.  Keep patient on SIMV on vent at this time and recheck ABG's at 17:00pm.

## 2020-01-21 ENCOUNTER — APPOINTMENT (OUTPATIENT)
Dept: GENERAL RADIOLOGY | Age: 60
DRG: 220 | End: 2020-01-21
Attending: SURGERY
Payer: COMMERCIAL

## 2020-01-21 LAB
ANION GAP SERPL CALCULATED.3IONS-SCNC: 10 MMOL/L (ref 4–16)
BASE EXCESS MIXED: 1.5 (ref 0–1.2)
BASE EXCESS MIXED: 6.2 (ref 0–1.2)
BASE EXCESS: ABNORMAL (ref 0–3.3)
BASE EXCESS: ABNORMAL (ref 0–3.3)
BUN BLDV-MCNC: 20 MG/DL (ref 6–23)
CALCIUM IONIZED: 4.2 MG/DL (ref 4.48–5.28)
CALCIUM SERPL-MCNC: 7.8 MG/DL (ref 8.3–10.6)
CHLORIDE BLD-SCNC: 105 MMOL/L (ref 99–110)
CO2 CONTENT: 27.7 MMOL/L (ref 19–24)
CO2: 19 MMOL/L (ref 21–32)
CO2: 23 MMOL/L (ref 21–32)
CREAT SERPL-MCNC: 1.2 MG/DL (ref 0.9–1.3)
GFR AFRICAN AMERICAN: >60 ML/MIN/1.73M2
GFR NON-AFRICAN AMERICAN: >60 ML/MIN/1.73M2
GLUCOSE BLD-MCNC: 108 MG/DL (ref 70–99)
GLUCOSE BLD-MCNC: 119 MG/DL (ref 70–99)
GLUCOSE BLD-MCNC: 121 MG/DL (ref 70–99)
GLUCOSE BLD-MCNC: 123 MG/DL (ref 70–99)
GLUCOSE BLD-MCNC: 123 MG/DL (ref 70–99)
GLUCOSE BLD-MCNC: 127 MG/DL (ref 70–99)
GLUCOSE BLD-MCNC: 131 MG/DL (ref 70–99)
GLUCOSE BLD-MCNC: 139 MG/DL (ref 70–99)
GLUCOSE BLD-MCNC: 142 MG/DL (ref 70–99)
GLUCOSE BLD-MCNC: 143 MG/DL (ref 70–99)
GLUCOSE BLD-MCNC: 149 MG/DL (ref 70–99)
GLUCOSE BLD-MCNC: 153 MG/DL (ref 70–99)
GLUCOSE BLD-MCNC: 154 MG/DL (ref 70–99)
GLUCOSE BLD-MCNC: 156 MG/DL (ref 70–99)
GLUCOSE BLD-MCNC: 73 MG/DL (ref 70–99)
GLUCOSE BLD-MCNC: 75 MG/DL (ref 70–99)
GLUCOSE BLD-MCNC: 81 MG/DL (ref 70–99)
GLUCOSE BLD-MCNC: 96 MG/DL (ref 70–99)
HCO3 ARTERIAL: 18.4 MMOL/L (ref 18–23)
HCO3 ARTERIAL: 26.4 MMOL/L (ref 18–23)
HCT VFR BLD CALC: 17 % (ref 42–52)
HCT VFR BLD CALC: 27 % (ref 42–52)
HCT VFR BLD CALC: 29.7 % (ref 42–52)
HEMOGLOBIN: 5.9 GM/DL (ref 13.5–18)
HEMOGLOBIN: 9.2 GM/DL (ref 13.5–18)
HEMOGLOBIN: 9.6 GM/DL (ref 13.5–18)
IONIZED CA: 1.05 MMOL/L (ref 1.12–1.32)
MAGNESIUM: 1.8 MG/DL (ref 1.8–2.4)
MCH RBC QN AUTO: 29.1 PG (ref 27–31)
MCHC RBC AUTO-ENTMCNC: 32.3 % (ref 32–36)
MCV RBC AUTO: 90 FL (ref 78–100)
O2 SATURATION: 55.1 % (ref 96–97)
O2 SATURATION: 90.2 % (ref 96–97)
PCO2 ARTERIAL: 31.4 MMHG (ref 32–45)
PCO2 ARTERIAL: 42.2 MMHG (ref 32–45)
PDW BLD-RTO: 12.5 % (ref 11.7–14.9)
PH BLOOD: 7.38 (ref 7.34–7.45)
PH BLOOD: 7.41 (ref 7.34–7.45)
PLATELET # BLD: 57 K/CU MM (ref 140–440)
PMV BLD AUTO: 11.3 FL (ref 7.5–11.1)
PO2 ARTERIAL: 29.2 MMHG (ref 75–100)
PO2 ARTERIAL: 59 MMHG (ref 75–100)
POC CALCIUM: 0.88 MMOL/L (ref 1.12–1.32)
POC CALCIUM: 1.17 MMOL/L (ref 1.12–1.32)
POC CHLORIDE: 120 MMOL/L (ref 98–109)
POC CREATININE: 1 MG/DL (ref 0.9–1.3)
POTASSIUM SERPL-SCNC: 2.3 MMOL/L (ref 3.5–4.5)
POTASSIUM SERPL-SCNC: 3.9 MMOL/L (ref 3.5–5.1)
POTASSIUM SERPL-SCNC: 4 MMOL/L (ref 3.5–4.5)
POTASSIUM SERPL-SCNC: 4.1 MMOL/L (ref 3.5–5.1)
RBC # BLD: 3.3 M/CU MM (ref 4.6–6.2)
SODIUM BLD-SCNC: 138 MMOL/L (ref 135–145)
SODIUM BLD-SCNC: 146 MMOL/L (ref 138–146)
SODIUM BLD-SCNC: 152 MMOL/L (ref 138–146)
SOURCE, BLOOD GAS: ABNORMAL
SOURCE, BLOOD GAS: ABNORMAL
WBC # BLD: 11.2 K/CU MM (ref 4–10.5)

## 2020-01-21 PROCEDURE — 94761 N-INVAS EAR/PLS OXIMETRY MLT: CPT

## 2020-01-21 PROCEDURE — 82330 ASSAY OF CALCIUM: CPT

## 2020-01-21 PROCEDURE — 94640 AIRWAY INHALATION TREATMENT: CPT

## 2020-01-21 PROCEDURE — 2100000000 HC CCU R&B

## 2020-01-21 PROCEDURE — 97530 THERAPEUTIC ACTIVITIES: CPT

## 2020-01-21 PROCEDURE — 6360000002 HC RX W HCPCS: Performed by: SURGERY

## 2020-01-21 PROCEDURE — 85018 HEMOGLOBIN: CPT

## 2020-01-21 PROCEDURE — 6360000002 HC RX W HCPCS: Performed by: PHYSICIAN ASSISTANT

## 2020-01-21 PROCEDURE — 83735 ASSAY OF MAGNESIUM: CPT

## 2020-01-21 PROCEDURE — 82962 GLUCOSE BLOOD TEST: CPT

## 2020-01-21 PROCEDURE — 94150 VITAL CAPACITY TEST: CPT

## 2020-01-21 PROCEDURE — 84132 ASSAY OF SERUM POTASSIUM: CPT

## 2020-01-21 PROCEDURE — 6360000002 HC RX W HCPCS: Performed by: ANESTHESIOLOGY

## 2020-01-21 PROCEDURE — 97166 OT EVAL MOD COMPLEX 45 MIN: CPT

## 2020-01-21 PROCEDURE — 2580000003 HC RX 258: Performed by: PHYSICIAN ASSISTANT

## 2020-01-21 PROCEDURE — 82803 BLOOD GASES ANY COMBINATION: CPT

## 2020-01-21 PROCEDURE — 71045 X-RAY EXAM CHEST 1 VIEW: CPT

## 2020-01-21 PROCEDURE — 80048 BASIC METABOLIC PNL TOTAL CA: CPT

## 2020-01-21 PROCEDURE — 85027 COMPLETE CBC AUTOMATED: CPT

## 2020-01-21 PROCEDURE — 2580000003 HC RX 258

## 2020-01-21 PROCEDURE — 97162 PT EVAL MOD COMPLEX 30 MIN: CPT

## 2020-01-21 PROCEDURE — 85014 HEMATOCRIT: CPT

## 2020-01-21 PROCEDURE — P9045 ALBUMIN (HUMAN), 5%, 250 ML: HCPCS | Performed by: PHYSICIAN ASSISTANT

## 2020-01-21 PROCEDURE — 84295 ASSAY OF SERUM SODIUM: CPT

## 2020-01-21 PROCEDURE — 6370000000 HC RX 637 (ALT 250 FOR IP): Performed by: PHYSICIAN ASSISTANT

## 2020-01-21 RX ORDER — SODIUM CHLORIDE 9 MG/ML
INJECTION, SOLUTION INTRAVENOUS
Status: COMPLETED
Start: 2020-01-21 | End: 2020-01-21

## 2020-01-21 RX ADMIN — SODIUM CHLORIDE: 4.5 INJECTION, SOLUTION INTRAVENOUS at 06:42

## 2020-01-21 RX ADMIN — DEXTROSE MONOHYDRATE 3 G: 50 INJECTION, SOLUTION INTRAVENOUS at 04:00

## 2020-01-21 RX ADMIN — FENTANYL CITRATE 50 MCG: 50 INJECTION, SOLUTION INTRAMUSCULAR; INTRAVENOUS at 01:50

## 2020-01-21 RX ADMIN — TAMSULOSIN HYDROCHLORIDE 0.4 MG: 0.4 CAPSULE ORAL at 08:13

## 2020-01-21 RX ADMIN — CALCIUM GLUCONATE 2 G: 98 INJECTION, SOLUTION INTRAVENOUS at 09:19

## 2020-01-21 RX ADMIN — SODIUM CHLORIDE: 4.5 INJECTION, SOLUTION INTRAVENOUS at 22:28

## 2020-01-21 RX ADMIN — SODIUM CHLORIDE 500 ML: 9 INJECTION, SOLUTION INTRAVENOUS at 22:33

## 2020-01-21 RX ADMIN — IPRATROPIUM BROMIDE AND ALBUTEROL SULFATE 1 AMPULE: .5; 3 SOLUTION RESPIRATORY (INHALATION) at 07:14

## 2020-01-21 RX ADMIN — ACETAMINOPHEN 1000 MG: 10 INJECTION, SOLUTION INTRAVENOUS at 03:34

## 2020-01-21 RX ADMIN — ACETAMINOPHEN 1000 MG: 10 INJECTION, SOLUTION INTRAVENOUS at 08:13

## 2020-01-21 RX ADMIN — MONTELUKAST 10 MG: 10 TABLET, FILM COATED ORAL at 20:22

## 2020-01-21 RX ADMIN — MAGNESIUM SULFATE HEPTAHYDRATE 2 G: 40 INJECTION, SOLUTION INTRAVENOUS at 08:13

## 2020-01-21 RX ADMIN — AMIODARONE HYDROCHLORIDE 200 MG: 200 TABLET ORAL at 20:18

## 2020-01-21 RX ADMIN — PANTOPRAZOLE SODIUM 40 MG: 40 TABLET, DELAYED RELEASE ORAL at 08:13

## 2020-01-21 RX ADMIN — IPRATROPIUM BROMIDE AND ALBUTEROL SULFATE 1 AMPULE: .5; 3 SOLUTION RESPIRATORY (INHALATION) at 11:42

## 2020-01-21 RX ADMIN — SODIUM CHLORIDE, PRESERVATIVE FREE 10 ML: 5 INJECTION INTRAVENOUS at 22:34

## 2020-01-21 RX ADMIN — ALBUMIN (HUMAN) 25 G: 12.5 INJECTION, SOLUTION INTRAVENOUS at 06:36

## 2020-01-21 RX ADMIN — HYDROCODONE BITARTRATE AND ACETAMINOPHEN 2 TABLET: 5; 325 TABLET ORAL at 12:30

## 2020-01-21 RX ADMIN — HYDROCODONE BITARTRATE AND ACETAMINOPHEN 2 TABLET: 5; 325 TABLET ORAL at 22:27

## 2020-01-21 RX ADMIN — FENTANYL CITRATE 50 MCG: 50 INJECTION, SOLUTION INTRAMUSCULAR; INTRAVENOUS at 03:29

## 2020-01-21 RX ADMIN — IPRATROPIUM BROMIDE AND ALBUTEROL SULFATE 1 AMPULE: .5; 3 SOLUTION RESPIRATORY (INHALATION) at 15:20

## 2020-01-21 RX ADMIN — DOCUSATE SODIUM 100 MG: 100 CAPSULE, LIQUID FILLED ORAL at 08:13

## 2020-01-21 RX ADMIN — PROMETHAZINE HYDROCHLORIDE 6.25 MG: 25 INJECTION INTRAMUSCULAR; INTRAVENOUS at 00:29

## 2020-01-21 RX ADMIN — HYDROCODONE BITARTRATE AND ACETAMINOPHEN 2 TABLET: 5; 325 TABLET ORAL at 08:14

## 2020-01-21 RX ADMIN — Medication: at 22:34

## 2020-01-21 RX ADMIN — Medication: at 08:25

## 2020-01-21 RX ADMIN — ASPIRIN 81 MG: 81 TABLET, COATED ORAL at 08:13

## 2020-01-21 RX ADMIN — IPRATROPIUM BROMIDE AND ALBUTEROL SULFATE 1 AMPULE: .5; 3 SOLUTION RESPIRATORY (INHALATION) at 19:51

## 2020-01-21 RX ADMIN — AMIODARONE HYDROCHLORIDE 200 MG: 200 TABLET ORAL at 08:13

## 2020-01-21 RX ADMIN — HYDROCODONE BITARTRATE AND ACETAMINOPHEN 2 TABLET: 5; 325 TABLET ORAL at 17:23

## 2020-01-21 RX ADMIN — CARVEDILOL 3.12 MG: 3.12 TABLET, FILM COATED ORAL at 20:18

## 2020-01-21 RX ADMIN — DOCUSATE SODIUM 100 MG: 100 CAPSULE, LIQUID FILLED ORAL at 20:22

## 2020-01-21 RX ADMIN — AMIODARONE HYDROCHLORIDE 200 MG: 200 TABLET ORAL at 12:30

## 2020-01-21 RX ADMIN — ATORVASTATIN CALCIUM 20 MG: 20 TABLET, FILM COATED ORAL at 20:22

## 2020-01-21 ASSESSMENT — PAIN DESCRIPTION - DESCRIPTORS
DESCRIPTORS: ACHING
DESCRIPTORS: ACHING;DISCOMFORT
DESCRIPTORS: ACHING
DESCRIPTORS: ACHING;DISCOMFORT

## 2020-01-21 ASSESSMENT — PAIN SCALES - GENERAL
PAINLEVEL_OUTOF10: 7
PAINLEVEL_OUTOF10: 8
PAINLEVEL_OUTOF10: 2
PAINLEVEL_OUTOF10: 2
PAINLEVEL_OUTOF10: 8
PAINLEVEL_OUTOF10: 2
PAINLEVEL_OUTOF10: 8
PAINLEVEL_OUTOF10: 8
PAINLEVEL_OUTOF10: 6
PAINLEVEL_OUTOF10: 7
PAINLEVEL_OUTOF10: 0
PAINLEVEL_OUTOF10: 6

## 2020-01-21 ASSESSMENT — PAIN DESCRIPTION - PAIN TYPE
TYPE: ACUTE PAIN
TYPE: SURGICAL PAIN
TYPE: ACUTE PAIN
TYPE: SURGICAL PAIN

## 2020-01-21 ASSESSMENT — PAIN - FUNCTIONAL ASSESSMENT
PAIN_FUNCTIONAL_ASSESSMENT: PREVENTS OR INTERFERES SOME ACTIVE ACTIVITIES AND ADLS
PAIN_FUNCTIONAL_ASSESSMENT: ACTIVITIES ARE NOT PREVENTED
PAIN_FUNCTIONAL_ASSESSMENT: ACTIVITIES ARE NOT PREVENTED
PAIN_FUNCTIONAL_ASSESSMENT: PREVENTS OR INTERFERES SOME ACTIVE ACTIVITIES AND ADLS

## 2020-01-21 ASSESSMENT — PAIN DESCRIPTION - ORIENTATION
ORIENTATION: MID
ORIENTATION: MID

## 2020-01-21 ASSESSMENT — PAIN DESCRIPTION - ONSET
ONSET: ON-GOING
ONSET: ON-GOING
ONSET: GRADUAL
ONSET: ON-GOING

## 2020-01-21 ASSESSMENT — PAIN DESCRIPTION - PROGRESSION
CLINICAL_PROGRESSION: GRADUALLY WORSENING
CLINICAL_PROGRESSION: NOT CHANGED
CLINICAL_PROGRESSION: GRADUALLY WORSENING
CLINICAL_PROGRESSION: NOT CHANGED

## 2020-01-21 ASSESSMENT — PAIN DESCRIPTION - LOCATION
LOCATION: GENERALIZED
LOCATION: GENERALIZED
LOCATION: CHEST;GENERALIZED
LOCATION: CHEST

## 2020-01-21 ASSESSMENT — PAIN DESCRIPTION - FREQUENCY
FREQUENCY: CONTINUOUS
FREQUENCY: CONTINUOUS
FREQUENCY: INTERMITTENT
FREQUENCY: INTERMITTENT

## 2020-01-21 NOTE — PLAN OF CARE
Problem: Pain:  Goal: Pain level will decrease  Description  Pain level will decrease  Outcome: Ongoing  Goal: Control of acute pain  Description  Control of acute pain  Outcome: Ongoing  Goal: Control of chronic pain  Description  Control of chronic pain  Outcome: Ongoing     Problem: Discharge Planning:  Goal: Discharged to appropriate level of care  Description  Discharged to appropriate level of care  Outcome: Ongoing     Problem: Cardiac Output - Decreased:  Goal: Cardiac output within specified parameters  Description  Cardiac output within specified parameters  Outcome: Ongoing     Problem: Infection - Surgical Site:  Goal: Will show no infection signs and symptoms  Description  Will show no infection signs and symptoms  Outcome: Ongoing     Problem: Pain - Acute:  Goal: Pain level will decrease  Description  Pain level will decrease  Outcome: Ongoing     Problem: Venous Thromboembolism:  Goal: Will show no signs or symptoms of venous thromboembolism  Description  Will show no signs or symptoms of venous thromboembolism  Outcome: Ongoing  Goal: Absence of signs or symptoms of impaired coagulation  Description  Absence of signs or symptoms of impaired coagulation  Outcome: Ongoing

## 2020-01-21 NOTE — PLAN OF CARE
Problem: Pain:  Goal: Pain level will decrease  Description  Pain level will decrease  Outcome: Ongoing  Goal: Control of acute pain  Description  Control of acute pain  Outcome: Ongoing  Goal: Control of chronic pain  Description  Control of chronic pain  Outcome: Ongoing     Problem: Discharge Planning:  Goal: Discharged to appropriate level of care  Description  Discharged to appropriate level of care  Outcome: Ongoing     Problem: Cardiac Output - Decreased:  Goal: Cardiac output within specified parameters  Description  Cardiac output within specified parameters  Outcome: Ongoing     Problem: Infection - Surgical Site:  Goal: Will show no infection signs and symptoms  Description  Will show no infection signs and symptoms  Outcome: Ongoing     Problem: Pain - Acute:  Goal: Pain level will decrease  Description  Pain level will decrease  Outcome: Ongoing     Problem: Venous Thromboembolism:  Goal: Will show no signs or symptoms of venous thromboembolism  Description  Will show no signs or symptoms of venous thromboembolism  Outcome: Ongoing  Goal: Absence of signs or symptoms of impaired coagulation  Description  Absence of signs or symptoms of impaired coagulation  Outcome: Ongoing     Problem: Risk for Impaired Skin Integrity  Goal: Tissue integrity - skin and mucous membranes  Description  Structural intactness and normal physiological function of skin and  mucous membranes.   Outcome: Ongoing

## 2020-01-21 NOTE — CONSULTS
Reason for Consult? cabg  No history of Diabetes Education deferred   HgBA1c:    Lab Results   Component Value Date    LABA1C 5.5 12/16/2019     Khloe Bell RN, BSN, CDE

## 2020-01-21 NOTE — OP NOTE
DATE OF SURGERY:  1/20/20       PREOPERATIVE DIAGNOSIS:  Ascending aortic aneurysm. AI     POSTOPERATIVE DIAGNOSIS:  Same     SURGEON:  Junior Cresencio CASTANON, Mesha Vizcaino MD     FIRST ASSISTANT:  Darlene Landeros     OPERATION:  Ascending aortic replacement using a 32 mm graft and AVR with 27mm bioprosthetic valve via ministernotomy. ANESTHESIA:  General.     TISSUE REMOVED:  None. DRAINS:  Two mediastinal Adán drain. POSITION:  Supine. FINDINGS:  There was no PVL evidence at the conclusion of the case. Good LV function at the conclusion of the case. Significant LVH. Deep femoral vessels. DESCRIPTION OF OPERATION:  The patient was brought to the operating room and given general anesthesia. Radial artery catheter. Patient was prepared and draped exposing the left subclavian area with complete barrier precautions. A needle was placed in the subclavian vein and a guidewire was placed through the needle. The needle was placed a second time in the subclavian vein and a subsequent guidewire was placed. The first guidewire was dilated and a triple lumen catheter was placed over the guidewire. The catheter was flushed and secured with suture. An introductory sheath was placed over the second guidewire and then a Farrukh Ivy catheter was floated with pressure monitoring guidance until wedged. Hemodynamic obtained and catheter was anchored to the skin with suture. An upper mini sternotomy incision was made. The sternum was divided in an inverted-T fashion into the third interspace. A small rib  was then placed. The thymic tissue was divided in the midline. The pericardium was incised and suspended. The right femoral artery and vein were accessed via cutdown. Using Seldinger technique, a guidewire was placed in both the right femoral artery and the femoral vein. A 23-25 venous cannula was placed in the right femoral vein.   The patient was heparinized and an arterial cannula was placed in the right femoral artery. The patient was placed on cardiopulmonary bypass. The aortic cross-clamp was then placed high in the ascending aorta just proximal to the innominate artery where the ascending aorta tapered down to a normal size. Cardioplegia was given through the ascending aorta. The aorta was then opened longitudinally. The aortic valve was inspected. The aortic valve leaflets were excised. The annulus was debrided using pituitary rongeurs  The left ventricle was copiously irrigated with saline solution. The annulus was then sized and a 27mm valve was selected. Valve sutures were placed around the annulus. These were interrupted horizontal mattress sutures of 2-0 Ethibond with felt pledgets. All sutures were passed from the ventricular side to the aortic side of the annulus and then through the sewing ring of the valve. All sutures were tied and cut. The aneurysmal ascending aorta was then excised. A 32mm graft was selected. The proximal anastomosis was then done first.  This was done using a continuous suture of 4-0 Prolene approximately 1 cm above the coronary ostia; a felt strip was used as a second layer to reinforce. After this was done, the graft was appropriately beveled and the distal anastomosis was done using continuous suture of 4-0 Prolene. This was also reinforced with a second layer felt strip. The patient was then placed in Trendelenburg position. The aortic graft was vented and the aortic cross-clamp removed. The patient was taken off cardiopulmonary bypass without difficulty. After remaining stable off bypass, protamine was given and the venous cannula was removed and the arterial cannula was also removed and the pursestrings were tied. These were both repaired with additional 6-0 Prolene. Once hemostasis was confirmed, two Adán drains were placed and brought out through a separate stab wounds and sutured in place.   The incision was

## 2020-01-21 NOTE — FLOWSHEET NOTE
Patient given phenergan 6.25ivp per nausea. Will monitor. C/o a dry mouth and encouraged patient to let the med work so he won't get sick.

## 2020-01-21 NOTE — PROGRESS NOTES
Verbal order to remove art and swan line per Dr Ruth David. Patient is allowed to get out of bed. Patient ambulated with therapy and returned to chair, tolerates well.

## 2020-01-21 NOTE — PROGRESS NOTES
28  --   --   --  19* 23   BUN 17  --   --   --   --   --   --  20   CREATININE 1.2   < > 1.3  --   --   --  1.0 1.2   GLUCOSE 148*  --   --   --   --   --   --  154*    < > = values in this interval not displayed. Hepatic: No results for input(s): AST, ALT, ALB, BILITOT, ALKPHOS in the last 72 hours. Mag:      Recent Labs     01/20/20  1506 01/21/20  0430   MG 2.0 1.8      Phos:   No results for input(s): PHOS in the last 72 hours. INR:   Recent Labs     01/20/20  1506   INR 1.26       Radiology Review:  CXR  Impression:     Patchy bilateral airspace opacities could be due to atelectasis or pneumonia. ASSESSMENT AND PLAN:    Patient Active Problem List   Diagnosis    GERD (gastroesophageal reflux disease)    Impotence    Coronary arteriosclerosis    BPH (benign prostatic hyperplasia)    Essential hypertension    Pure hypercholesterolemia    Aortic valve disease    Thoracic aortic aneurysm without rupture ([address]    COPD (chronic obstructive pulmonary disease) ([address]    Ascending aortic aneurysm (HCC)       S/P mini AVR, ascending aortic repair    Cardio: stable, weaning levo. Hold coreg. On PO amio. Will need AC prior to DC for AVR. Pulm: stable on RA, encourage IS. GI: stable  Renal: creatinine stable 1.2, adequate UOP. Continue gentle hydration.    Tubes/Lines: DC art line, swan  Wound: stable    Paz Jimenez PA-C

## 2020-01-21 NOTE — PROGRESS NOTES
position needing extended time with static stance prior to initiating gait. · Gait: 200ft with CW CGA for safety. Fair, consistent pace. No LOB noted. Slight anterior posture. Fair step length and foot clearance. · Educated pt on POC, role of PT, DME, sternal precautions, discharge rec. VCs for sequencing ,posture, weight shift, UE/LE placement to inc safety and indep with mobility. [facility] 6 Clicks Inpatient Mobility:  AM-PAC Inpatient Mobility Raw Score : 15    Safety: patient left in chair, call light within reach, RN notified, gait belt used. Assessment: Body structures, Functions, Activity limitations: Decreased safe awareness; Decreased endurance; Decreased balance; Decreased functional mobility ; Increased pain; Decreased ADL status; Decreased strength  Pt is a 64year old male admitted with thoracic ascending aortic aneurysm repair and AVR. Recommend home with 24/7 supervision/assist and  PT once medically stable. At baseline, he is indep with gross mobility, working full time, and was able to complete ADLs indep. He is functioning below typical baseline and would benefit from continued therapy services to address deficits, dec potential fall risk, and restore function. Complexity: Moderate  Prognosis: Good, no significant barriers to participation at this time.    Plan Times per week: 6+/week  Discharge Recommendations: 24 hour supervision or assist, Home with Home health PT, S Level 2  Equipment: continue to assess any needs for AD to ambulate/transfer     Goals:  Short term goals  Time Frame for Short term goals: 1 week   Short term goal 1: Pt will perform sit><supine modA   Short term goal 2: Pt will transfer to bed/recliner SBA   Short term goal 3: Pt will ambulate 200ft with LRAD SBA   Short term goal 4: Pt will ascend/descend 12 steps, single rail SBA   Short term goal 5: Pt will complete cardiac HEP x 15 reps indep        Treatment plan:  Strengthening; Balance Training; Transfer

## 2020-01-21 NOTE — FLOWSHEET NOTE
Patient vomited a small amount of bile. Mouthwash given to cleanse mouth. Dr Miller Go here asking how patient is doing and informed him of the emesis for the third time. Order obtained for phenergan.

## 2020-01-21 NOTE — CARE COORDINATION
Patient is POd# 1 AVR. He is from home with family. . Independent prior to admission. He has a PCP and insurance that assist with Rx when needed. This CM will follow for discharge needs.  Daniel Roblero RN

## 2020-01-22 ENCOUNTER — APPOINTMENT (OUTPATIENT)
Dept: GENERAL RADIOLOGY | Age: 60
DRG: 220 | End: 2020-01-22
Attending: SURGERY
Payer: COMMERCIAL

## 2020-01-22 LAB
ANION GAP SERPL CALCULATED.3IONS-SCNC: 7 MMOL/L (ref 4–16)
BUN BLDV-MCNC: 19 MG/DL (ref 6–23)
CALCIUM IONIZED: 4.2 MG/DL (ref 4.48–5.28)
CALCIUM SERPL-MCNC: 7.3 MG/DL (ref 8.3–10.6)
CHLORIDE BLD-SCNC: 102 MMOL/L (ref 99–110)
CO2: 26 MMOL/L (ref 21–32)
CREAT SERPL-MCNC: 1.2 MG/DL (ref 0.9–1.3)
GFR AFRICAN AMERICAN: >60 ML/MIN/1.73M2
GFR NON-AFRICAN AMERICAN: >60 ML/MIN/1.73M2
GLUCOSE BLD-MCNC: 106 MG/DL (ref 70–99)
GLUCOSE BLD-MCNC: 106 MG/DL (ref 70–99)
GLUCOSE BLD-MCNC: 109 MG/DL (ref 70–99)
GLUCOSE BLD-MCNC: 110 MG/DL (ref 70–99)
GLUCOSE BLD-MCNC: 112 MG/DL (ref 70–99)
GLUCOSE BLD-MCNC: 112 MG/DL (ref 70–99)
GLUCOSE BLD-MCNC: 116 MG/DL (ref 70–99)
GLUCOSE BLD-MCNC: 118 MG/DL (ref 70–99)
GLUCOSE BLD-MCNC: 123 MG/DL (ref 70–99)
GLUCOSE BLD-MCNC: 130 MG/DL (ref 70–99)
GLUCOSE BLD-MCNC: 138 MG/DL (ref 70–99)
GLUCOSE BLD-MCNC: 141 MG/DL (ref 70–99)
GLUCOSE BLD-MCNC: 142 MG/DL (ref 70–99)
GLUCOSE BLD-MCNC: 162 MG/DL (ref 70–99)
GLUCOSE BLD-MCNC: 174 MG/DL (ref 70–99)
GLUCOSE BLD-MCNC: 189 MG/DL (ref 70–99)
HCT VFR BLD CALC: 26 % (ref 42–52)
HEMOGLOBIN: 8.2 GM/DL (ref 13.5–18)
IONIZED CA: 1.05 MMOL/L (ref 1.12–1.32)
MAGNESIUM: 2.2 MG/DL (ref 1.8–2.4)
MCH RBC QN AUTO: 29.4 PG (ref 27–31)
MCHC RBC AUTO-ENTMCNC: 31.5 % (ref 32–36)
MCV RBC AUTO: 93.2 FL (ref 78–100)
PDW BLD-RTO: 12.6 % (ref 11.7–14.9)
PLATELET # BLD: 56 K/CU MM (ref 140–440)
PMV BLD AUTO: 11.7 FL (ref 7.5–11.1)
POTASSIUM SERPL-SCNC: 4.1 MMOL/L (ref 3.5–5.1)
RBC # BLD: 2.79 M/CU MM (ref 4.6–6.2)
SODIUM BLD-SCNC: 135 MMOL/L (ref 135–145)
WBC # BLD: 9.7 K/CU MM (ref 4–10.5)

## 2020-01-22 PROCEDURE — 82330 ASSAY OF CALCIUM: CPT

## 2020-01-22 PROCEDURE — 94640 AIRWAY INHALATION TREATMENT: CPT

## 2020-01-22 PROCEDURE — 2700000000 HC OXYGEN THERAPY PER DAY

## 2020-01-22 PROCEDURE — 2580000003 HC RX 258: Performed by: PHYSICIAN ASSISTANT

## 2020-01-22 PROCEDURE — 71045 X-RAY EXAM CHEST 1 VIEW: CPT

## 2020-01-22 PROCEDURE — 82962 GLUCOSE BLOOD TEST: CPT

## 2020-01-22 PROCEDURE — 2100000000 HC CCU R&B

## 2020-01-22 PROCEDURE — 6370000000 HC RX 637 (ALT 250 FOR IP): Performed by: SURGERY

## 2020-01-22 PROCEDURE — 85027 COMPLETE CBC AUTOMATED: CPT

## 2020-01-22 PROCEDURE — 80048 BASIC METABOLIC PNL TOTAL CA: CPT

## 2020-01-22 PROCEDURE — 6360000002 HC RX W HCPCS: Performed by: PHYSICIAN ASSISTANT

## 2020-01-22 PROCEDURE — 83735 ASSAY OF MAGNESIUM: CPT

## 2020-01-22 PROCEDURE — 97110 THERAPEUTIC EXERCISES: CPT

## 2020-01-22 PROCEDURE — 94761 N-INVAS EAR/PLS OXIMETRY MLT: CPT

## 2020-01-22 PROCEDURE — 6370000000 HC RX 637 (ALT 250 FOR IP): Performed by: PHYSICIAN ASSISTANT

## 2020-01-22 PROCEDURE — 97116 GAIT TRAINING THERAPY: CPT

## 2020-01-22 RX ORDER — FUROSEMIDE 10 MG/ML
20 INJECTION INTRAMUSCULAR; INTRAVENOUS DAILY
Status: DISCONTINUED | OUTPATIENT
Start: 2020-01-22 | End: 2020-01-24 | Stop reason: HOSPADM

## 2020-01-22 RX ADMIN — SODIUM CHLORIDE, PRESERVATIVE FREE 10 ML: 5 INJECTION INTRAVENOUS at 21:00

## 2020-01-22 RX ADMIN — CARVEDILOL 3.12 MG: 3.12 TABLET, FILM COATED ORAL at 20:35

## 2020-01-22 RX ADMIN — Medication: at 20:35

## 2020-01-22 RX ADMIN — FUROSEMIDE 20 MG: 10 INJECTION, SOLUTION INTRAMUSCULAR; INTRAVENOUS at 10:35

## 2020-01-22 RX ADMIN — HYDROCODONE BITARTRATE AND ACETAMINOPHEN 2 TABLET: 5; 325 TABLET ORAL at 18:53

## 2020-01-22 RX ADMIN — DOCUSATE SODIUM 100 MG: 100 CAPSULE, LIQUID FILLED ORAL at 09:18

## 2020-01-22 RX ADMIN — CALCIUM GLUCONATE 2 G: 98 INJECTION, SOLUTION INTRAVENOUS at 10:30

## 2020-01-22 RX ADMIN — SODIUM CHLORIDE: 4.5 INJECTION, SOLUTION INTRAVENOUS at 15:08

## 2020-01-22 RX ADMIN — PANTOPRAZOLE SODIUM 40 MG: 40 TABLET, DELAYED RELEASE ORAL at 09:18

## 2020-01-22 RX ADMIN — HYDROCODONE BITARTRATE AND ACETAMINOPHEN 2 TABLET: 5; 325 TABLET ORAL at 22:47

## 2020-01-22 RX ADMIN — SODIUM CHLORIDE 4.1 UNITS/HR: 9 INJECTION, SOLUTION INTRAVENOUS at 09:56

## 2020-01-22 RX ADMIN — APIXABAN 5 MG: 5 TABLET, FILM COATED ORAL at 09:19

## 2020-01-22 RX ADMIN — ASPIRIN 81 MG: 81 TABLET, COATED ORAL at 09:18

## 2020-01-22 RX ADMIN — IPRATROPIUM BROMIDE AND ALBUTEROL SULFATE 1 AMPULE: .5; 3 SOLUTION RESPIRATORY (INHALATION) at 21:05

## 2020-01-22 RX ADMIN — HYDROCODONE BITARTRATE AND ACETAMINOPHEN 2 TABLET: 5; 325 TABLET ORAL at 07:32

## 2020-01-22 RX ADMIN — MULTIPLE VITAMINS W/ MINERALS TAB 1 TABLET: TAB at 09:19

## 2020-01-22 RX ADMIN — SODIUM CHLORIDE, PRESERVATIVE FREE 10 ML: 5 INJECTION INTRAVENOUS at 10:38

## 2020-01-22 RX ADMIN — HYDROCODONE BITARTRATE AND ACETAMINOPHEN 2 TABLET: 5; 325 TABLET ORAL at 01:51

## 2020-01-22 RX ADMIN — MONTELUKAST 10 MG: 10 TABLET, FILM COATED ORAL at 20:35

## 2020-01-22 RX ADMIN — TAMSULOSIN HYDROCHLORIDE 0.4 MG: 0.4 CAPSULE ORAL at 09:19

## 2020-01-22 RX ADMIN — IPRATROPIUM BROMIDE AND ALBUTEROL SULFATE 1 AMPULE: .5; 3 SOLUTION RESPIRATORY (INHALATION) at 12:05

## 2020-01-22 RX ADMIN — IPRATROPIUM BROMIDE AND ALBUTEROL SULFATE 1 AMPULE: .5; 3 SOLUTION RESPIRATORY (INHALATION) at 18:09

## 2020-01-22 RX ADMIN — ATORVASTATIN CALCIUM 20 MG: 20 TABLET, FILM COATED ORAL at 20:35

## 2020-01-22 RX ADMIN — Medication: at 09:19

## 2020-01-22 RX ADMIN — IPRATROPIUM BROMIDE AND ALBUTEROL SULFATE 1 AMPULE: .5; 3 SOLUTION RESPIRATORY (INHALATION) at 04:20

## 2020-01-22 RX ADMIN — AMIODARONE HYDROCHLORIDE 200 MG: 200 TABLET ORAL at 20:35

## 2020-01-22 RX ADMIN — DOCUSATE SODIUM 100 MG: 100 CAPSULE, LIQUID FILLED ORAL at 20:35

## 2020-01-22 RX ADMIN — AMIODARONE HYDROCHLORIDE 200 MG: 200 TABLET ORAL at 09:19

## 2020-01-22 RX ADMIN — CARVEDILOL 3.12 MG: 3.12 TABLET, FILM COATED ORAL at 09:18

## 2020-01-22 RX ADMIN — AMIODARONE HYDROCHLORIDE 200 MG: 200 TABLET ORAL at 14:17

## 2020-01-22 RX ADMIN — IPRATROPIUM BROMIDE AND ALBUTEROL SULFATE 1 AMPULE: .5; 3 SOLUTION RESPIRATORY (INHALATION) at 08:17

## 2020-01-22 RX ADMIN — HYDROCODONE BITARTRATE AND ACETAMINOPHEN 2 TABLET: 5; 325 TABLET ORAL at 14:18

## 2020-01-22 ASSESSMENT — PAIN SCALES - GENERAL
PAINLEVEL_OUTOF10: 9
PAINLEVEL_OUTOF10: 8
PAINLEVEL_OUTOF10: 8
PAINLEVEL_OUTOF10: 5
PAINLEVEL_OUTOF10: 8
PAINLEVEL_OUTOF10: 7
PAINLEVEL_OUTOF10: 8
PAINLEVEL_OUTOF10: 6
PAINLEVEL_OUTOF10: 5

## 2020-01-22 ASSESSMENT — PAIN - FUNCTIONAL ASSESSMENT
PAIN_FUNCTIONAL_ASSESSMENT: ACTIVITIES ARE NOT PREVENTED
PAIN_FUNCTIONAL_ASSESSMENT: PREVENTS OR INTERFERES SOME ACTIVE ACTIVITIES AND ADLS
PAIN_FUNCTIONAL_ASSESSMENT: ACTIVITIES ARE NOT PREVENTED
PAIN_FUNCTIONAL_ASSESSMENT: PREVENTS OR INTERFERES SOME ACTIVE ACTIVITIES AND ADLS
PAIN_FUNCTIONAL_ASSESSMENT: ACTIVITIES ARE NOT PREVENTED

## 2020-01-22 ASSESSMENT — PAIN DESCRIPTION - ONSET
ONSET: GRADUAL
ONSET: GRADUAL
ONSET: PROGRESSIVE
ONSET: PROGRESSIVE
ONSET: AWAKENED FROM SLEEP

## 2020-01-22 ASSESSMENT — PAIN DESCRIPTION - PAIN TYPE
TYPE: SURGICAL PAIN
TYPE: SURGICAL PAIN
TYPE: ACUTE PAIN;SURGICAL PAIN
TYPE: SURGICAL PAIN

## 2020-01-22 ASSESSMENT — PAIN DESCRIPTION - DESCRIPTORS
DESCRIPTORS: ACHING
DESCRIPTORS: ACHING
DESCRIPTORS: ACHING;DISCOMFORT
DESCRIPTORS: ACHING
DESCRIPTORS: ACHING

## 2020-01-22 ASSESSMENT — PAIN DESCRIPTION - ORIENTATION
ORIENTATION: MID
ORIENTATION: MID;UPPER
ORIENTATION: MID
ORIENTATION: MID;UPPER

## 2020-01-22 ASSESSMENT — PAIN DESCRIPTION - LOCATION
LOCATION: CHEST

## 2020-01-22 ASSESSMENT — PAIN DESCRIPTION - PROGRESSION
CLINICAL_PROGRESSION: GRADUALLY WORSENING

## 2020-01-22 ASSESSMENT — PAIN DESCRIPTION - FREQUENCY
FREQUENCY: INTERMITTENT
FREQUENCY: CONTINUOUS
FREQUENCY: INTERMITTENT

## 2020-01-22 NOTE — PROGRESS NOTES
PATIENT NAME: Kinga Cain    TODAY'S DATE: 01/22/20    SUBJECTIVE:    Pt is POD # 2 s/p mini AVR, ascending aortic repair. Pt c/o mild pain. Denies SOB. He has been up walking in halls. OBJECTIVE:   VITALS:    Vitals:    01/22/20 0918   BP: 133/71   Pulse: 78   Resp:    Temp:    SpO2:      INTAKE/OUTPUT:    Date 01/22/20 0000 - 01/22/20 2359   Shift 4343-1401 8749-6501 4034-5723 24 Hour Total   INTAKE   Shift Total(mL/kg)       OUTPUT   Urine(mL/kg/hr) 350(0.3)   350   Shift Total(mL/kg) 350(2.6)   350(2.6)   Weight (kg) 134 134 134 134      Patient Vitals for the past 96 hrs (Last 3 readings):   Weight   01/22/20 0515 295 lb 6.7 oz (134 kg)   01/21/20 0603 294 lb 15.6 oz (133.8 kg)   01/20/20 1526 279 lb 3.2 oz (126.6 kg)       EXAM:  Blood pressure 133/71, pulse 78, temperature 98.3 °F (36.8 °C), temperature source Oral, resp. rate 18, height 6' 2\" (1.88 m), weight (S) 295 lb 6.7 oz (134 kg), SpO2 95 %. General appearance: No apparent distress, appears stated age and cooperative. Skin: unremarkable  HEENT Normocephalic, atraumatic without obvious deformity. Neck: Supple, Trachea midline   Lungs: Good respiratory effort. Clear to auscultation, bilaterally  Heart: Regular rate/ rhythm inc c/d/i  Abdomen: Soft, non-tender or non-distended   Extremities: 1+ edema warm well perfused  Neurologic: Alert, grossly intact  Mental status: normal affect      Data:  CBC:   Recent Labs     01/20/20  1506  01/21/20  0200 01/21/20  0430 01/22/20  0421   WBC 17.9*  --   --  11.2* 9.7   HGB 12.3*   < > 5.9* 9.6* 8.2*   HCT 38.3*   < > 17.0* 29.7* 26.0*   PLT 97  SRCC  *  --   --  57* 56*    < > = values in this interval not displayed.      BMP:    Recent Labs     01/20/20  1506  01/21/20  0200 01/21/20  0430 01/22/20  0421      < > 152* 138 135   K 4.7   < > 2.3* 3.9 4.1     --   --  105 102   CO2 22   < > 19* 23 26   BUN 17  --   --  20 19   CREATININE 1.2   < > 1.0 1.2 1.2   GLUCOSE 148*  --   --  154* 109*    < > = values in this interval not displayed. Hepatic: No results for input(s): AST, ALT, ALB, BILITOT, ALKPHOS in the last 72 hours. Mag:      Recent Labs     01/20/20  1506 01/21/20  0430 01/22/20  0421   MG 2.0 1.8 2.2      Phos:   No results for input(s): PHOS in the last 72 hours. INR:   Recent Labs     01/20/20  1506   INR 1.26       Radiology Review:  CXR  Impression:     Stable mild interstitial edema and small bilateral pleural effusions. ASSESSMENT AND PLAN:    Patient Active Problem List   Diagnosis    GERD (gastroesophageal reflux disease)    Impotence    Coronary arteriosclerosis    BPH (benign prostatic hyperplasia)    Essential hypertension    Pure hypercholesterolemia    Aortic valve disease    Thoracic aortic aneurysm without rupture ([address]    COPD (chronic obstructive pulmonary disease) ([address]    Ascending aortic aneurysm (HCC)       S/P mini AVR, ascending aortic repair    Cardio: stable, of pressors. On low dose coreg. On PO amio. Will need AC prior to DC for AVR. Pulm: stable on RA, encourage IS. Increasing pulm edema on CXR. GI: stable  Renal: creatinine stable 1.2, adequate UOP. DC IVF, start lasix QD.    Wound: stable  PT/OT    Priscila Babin PA-C

## 2020-01-22 NOTE — PROGRESS NOTES
Seen by cardiac rehab status post AVR and aneurysm repair  Patient is awake alert and up in chair  Family at bedside  Patient is agreeable to a teaching session   Teaching done on ankle pumps for DVT prevention  Returned demonstration   Teaching reinforced on hourly use of IS followed by controlled coughing   Has been using independently     Teaching done on need for adequate caloric and protein intake for strength and wound healing   Advised to avoid fried foods  Cautioned to avoid meats with high sodium content     Teaching done on need for adequate fiber and hydration for bowel motility   Patient reports he has had a small BM this am     Used Understanding heart surgery book to illustrate cardiac diet  Explained allowances made for fat and cholesterol during early recovery for strength and wound healing  All questions answered to everyone's satisfaction  Support given  All voiced understanding to all information provided  Will follow

## 2020-01-22 NOTE — PROGRESS NOTES
Physical Therapy    Physical Therapy Treatment Note  Name: Jamal Cerda MRN: [id number] :   1960   Date:  2020   Admission Date: 2020 Room:  -A   Restrictions/Precautions:        Sternal Precautions, SCDs, portable tele, BP cuff, pulse ox  Communication with other providers:  Deya Padilla RN states pt is ok to see for therapy  Subjective:  Patient states:  He is tired, but agreeable to therapy. Pt reports he doesn't feel any pain yet today  Pain:   Location, Type, Intensity (0/10 to 10/10):  0/10  Objective:    Observation:  Pt was reclined in chair  Treatment, including education/measures:  Vital Signs  HR: 78, B/P 133/71, O2 93% on room air  Education:  Pt was instructed in Sternal Precautions, Purpose of Exercise Program, Ganga Scale and Signs and Symptoms of Exercise Intolerance per Cardiac Protocol  Pt was instructed in Intermediate Cardiac ex program: sitting  Overhead Side Stretch x 10  Ankle Pumps/ Heel Raises x 10  Marching Seated x 10  Forward Arm Raises x 10  Side Arm Raises x 10  Arm Crosses x 10  Arm Circles Forwards and Backwards x 10  SittingTrunkTwist x 10  Transfers  Scooting : SBA with VC's to maintain sternal precautions and have feet touching the floor  Sit to stand : Min A x 2 with VC's to rock forward x 3 and stand using BLE. Pt c/o dizziness when standing up, but after 30 seconds reported he felt okay to cont  Stand to sit : Min A with VC's to not plop  Gait:  Pt amb with no AD for 150 ft with Min A assist. Pt was SOB and needed to stop and rest before cont. Pt needed VC's for pathway and proper breathing  Assessment / Impression:    Patient's tolerance of treatment:  Good, pt tolerated all exercises and walked without AD. Pt becomes SOB and needs to take a break to focus of breathing before cont.   Adverse Reaction: none  Significant change in status and impact:  none  Barriers to improvement:  Weakness and Endurance  Plan for Next Session:    Will cont to progress ex's and gt per cardiac protocol and educate pt on sternal precautions, S & S of ex intolerance, purpose of ex's, progression of ex's and gt at home. Time in:  0830  Time out:  0925  Timed treatment minutes:  55  Total treatment time:  54  Previously filed items:  Social/Functional History  Lives With: Spouse, Son  Type of Home: House  Home Layout: Two level, Bed/Bath upstairs, 1/2 bath on main level  Home Access: Stairs to enter without rails  Entrance Stairs - Number of Steps: 1   Bathroom Shower/Tub: Walk-in shower  Bathroom Toilet: Standard  Bathroom Equipment: Grab bars in [address]: Lift chair(reports having soft/low furniture that pt already had difficulty standing from so recently purchased lift chair)  ADL Assistance: Independent  Homemaking Assistance: Independent  Ambulation Assistance: Independent  Transfer Assistance: Independent  Active : Yes  Occupation: Full time employment  Type of occupation:    Leisure & Hobbies: camping   Additional Comments: wife works but plans to be home with pt for the first week   Short term goals  Time Frame for Short term goals: 1 week   Short term goal 1: Pt will perform sit><supine modA   Short term goal 2: Pt will transfer to bed/recliner SBA   Short term goal 3: Pt will ambulate 200ft with LRAD SBA   Short term goal 4: Pt will ascend/descend 12 steps, single rail SBA   Short term goal 5: Pt will complete cardiac HEP x 15 reps indep   Electronically signed by:     Gisela Urrutia PTA  1/22/2020, 9:13 AM

## 2020-01-23 ENCOUNTER — APPOINTMENT (OUTPATIENT)
Dept: GENERAL RADIOLOGY | Age: 60
DRG: 220 | End: 2020-01-23
Attending: SURGERY
Payer: COMMERCIAL

## 2020-01-23 LAB
ANION GAP SERPL CALCULATED.3IONS-SCNC: 10 MMOL/L (ref 4–16)
BUN BLDV-MCNC: 20 MG/DL (ref 6–23)
CALCIUM IONIZED: 4.2 MG/DL (ref 4.48–5.28)
CALCIUM SERPL-MCNC: 7.3 MG/DL (ref 8.3–10.6)
CHLORIDE BLD-SCNC: 100 MMOL/L (ref 99–110)
CO2: 25 MMOL/L (ref 21–32)
CREAT SERPL-MCNC: 1 MG/DL (ref 0.9–1.3)
GFR AFRICAN AMERICAN: >60 ML/MIN/1.73M2
GFR NON-AFRICAN AMERICAN: >60 ML/MIN/1.73M2
GLUCOSE BLD-MCNC: 102 MG/DL (ref 70–99)
GLUCOSE BLD-MCNC: 104 MG/DL (ref 70–99)
GLUCOSE BLD-MCNC: 107 MG/DL (ref 70–99)
GLUCOSE BLD-MCNC: 110 MG/DL (ref 70–99)
GLUCOSE BLD-MCNC: 135 MG/DL (ref 70–99)
GLUCOSE BLD-MCNC: 96 MG/DL (ref 70–99)
GLUCOSE BLD-MCNC: 99 MG/DL (ref 70–99)
HCT VFR BLD CALC: 22.9 % (ref 42–52)
HEMOGLOBIN: 7.2 GM/DL (ref 13.5–18)
IONIZED CA: 1.05 MMOL/L (ref 1.12–1.32)
MAGNESIUM: 2.3 MG/DL (ref 1.8–2.4)
MCH RBC QN AUTO: 28.9 PG (ref 27–31)
MCHC RBC AUTO-ENTMCNC: 31.4 % (ref 32–36)
MCV RBC AUTO: 92 FL (ref 78–100)
PDW BLD-RTO: 12.6 % (ref 11.7–14.9)
PLATELET # BLD: 63 K/CU MM (ref 140–440)
PMV BLD AUTO: 11.2 FL (ref 7.5–11.1)
POTASSIUM SERPL-SCNC: 3.8 MMOL/L (ref 3.5–5.1)
RBC # BLD: 2.49 M/CU MM (ref 4.6–6.2)
SODIUM BLD-SCNC: 135 MMOL/L (ref 135–145)
WBC # BLD: 8.1 K/CU MM (ref 4–10.5)

## 2020-01-23 PROCEDURE — 85027 COMPLETE CBC AUTOMATED: CPT

## 2020-01-23 PROCEDURE — 6360000002 HC RX W HCPCS: Performed by: SURGERY

## 2020-01-23 PROCEDURE — 97116 GAIT TRAINING THERAPY: CPT

## 2020-01-23 PROCEDURE — 94640 AIRWAY INHALATION TREATMENT: CPT

## 2020-01-23 PROCEDURE — 2100000000 HC CCU R&B

## 2020-01-23 PROCEDURE — 6370000000 HC RX 637 (ALT 250 FOR IP): Performed by: SURGERY

## 2020-01-23 PROCEDURE — 80048 BASIC METABOLIC PNL TOTAL CA: CPT

## 2020-01-23 PROCEDURE — 71046 X-RAY EXAM CHEST 2 VIEWS: CPT

## 2020-01-23 PROCEDURE — 6370000000 HC RX 637 (ALT 250 FOR IP): Performed by: PHYSICIAN ASSISTANT

## 2020-01-23 PROCEDURE — 2580000003 HC RX 258: Performed by: PHYSICIAN ASSISTANT

## 2020-01-23 PROCEDURE — 97530 THERAPEUTIC ACTIVITIES: CPT

## 2020-01-23 PROCEDURE — 82962 GLUCOSE BLOOD TEST: CPT

## 2020-01-23 PROCEDURE — 97110 THERAPEUTIC EXERCISES: CPT

## 2020-01-23 PROCEDURE — 94761 N-INVAS EAR/PLS OXIMETRY MLT: CPT

## 2020-01-23 PROCEDURE — 94150 VITAL CAPACITY TEST: CPT

## 2020-01-23 PROCEDURE — 82330 ASSAY OF CALCIUM: CPT

## 2020-01-23 PROCEDURE — 6360000002 HC RX W HCPCS: Performed by: PHYSICIAN ASSISTANT

## 2020-01-23 PROCEDURE — 83735 ASSAY OF MAGNESIUM: CPT

## 2020-01-23 RX ORDER — CARVEDILOL 6.25 MG/1
6.25 TABLET ORAL 2 TIMES DAILY WITH MEALS
Status: DISCONTINUED | OUTPATIENT
Start: 2020-01-23 | End: 2020-01-24 | Stop reason: HOSPADM

## 2020-01-23 RX ORDER — FUROSEMIDE 10 MG/ML
20 INJECTION INTRAMUSCULAR; INTRAVENOUS ONCE
Status: COMPLETED | OUTPATIENT
Start: 2020-01-23 | End: 2020-01-23

## 2020-01-23 RX ORDER — CARVEDILOL 3.12 MG/1
3.12 TABLET ORAL ONCE
Status: COMPLETED | OUTPATIENT
Start: 2020-01-23 | End: 2020-01-23

## 2020-01-23 RX ADMIN — CARVEDILOL 3.12 MG: 3.12 TABLET, FILM COATED ORAL at 11:50

## 2020-01-23 RX ADMIN — ATORVASTATIN CALCIUM 20 MG: 20 TABLET, FILM COATED ORAL at 21:09

## 2020-01-23 RX ADMIN — MULTIPLE VITAMINS W/ MINERALS TAB 1 TABLET: TAB at 08:12

## 2020-01-23 RX ADMIN — MONTELUKAST 10 MG: 10 TABLET, FILM COATED ORAL at 21:09

## 2020-01-23 RX ADMIN — IPRATROPIUM BROMIDE AND ALBUTEROL SULFATE 1 AMPULE: .5; 3 SOLUTION RESPIRATORY (INHALATION) at 07:25

## 2020-01-23 RX ADMIN — IPRATROPIUM BROMIDE AND ALBUTEROL SULFATE 1 AMPULE: .5; 3 SOLUTION RESPIRATORY (INHALATION) at 11:34

## 2020-01-23 RX ADMIN — TAMSULOSIN HYDROCHLORIDE 0.4 MG: 0.4 CAPSULE ORAL at 08:12

## 2020-01-23 RX ADMIN — SODIUM CHLORIDE, PRESERVATIVE FREE 10 ML: 5 INJECTION INTRAVENOUS at 21:10

## 2020-01-23 RX ADMIN — AMIODARONE HYDROCHLORIDE 200 MG: 200 TABLET ORAL at 08:11

## 2020-01-23 RX ADMIN — IPRATROPIUM BROMIDE AND ALBUTEROL SULFATE 1 AMPULE: .5; 3 SOLUTION RESPIRATORY (INHALATION) at 15:33

## 2020-01-23 RX ADMIN — CARVEDILOL 6.25 MG: 6.25 TABLET, FILM COATED ORAL at 16:34

## 2020-01-23 RX ADMIN — FUROSEMIDE 20 MG: 10 INJECTION, SOLUTION INTRAMUSCULAR; INTRAVENOUS at 11:51

## 2020-01-23 RX ADMIN — APIXABAN 5 MG: 5 TABLET, FILM COATED ORAL at 08:11

## 2020-01-23 RX ADMIN — Medication: at 21:09

## 2020-01-23 RX ADMIN — ASPIRIN 81 MG: 81 TABLET, COATED ORAL at 08:11

## 2020-01-23 RX ADMIN — FUROSEMIDE 20 MG: 10 INJECTION, SOLUTION INTRAMUSCULAR; INTRAVENOUS at 08:12

## 2020-01-23 RX ADMIN — SODIUM CHLORIDE, PRESERVATIVE FREE 10 ML: 5 INJECTION INTRAVENOUS at 11:52

## 2020-01-23 RX ADMIN — HYDROCODONE BITARTRATE AND ACETAMINOPHEN 2 TABLET: 5; 325 TABLET ORAL at 12:30

## 2020-01-23 RX ADMIN — PANTOPRAZOLE SODIUM 40 MG: 40 TABLET, DELAYED RELEASE ORAL at 08:12

## 2020-01-23 RX ADMIN — HYDROCODONE BITARTRATE AND ACETAMINOPHEN 2 TABLET: 5; 325 TABLET ORAL at 04:14

## 2020-01-23 RX ADMIN — CARVEDILOL 3.12 MG: 3.12 TABLET, FILM COATED ORAL at 08:11

## 2020-01-23 RX ADMIN — DOCUSATE SODIUM 100 MG: 100 CAPSULE, LIQUID FILLED ORAL at 08:12

## 2020-01-23 RX ADMIN — DOCUSATE SODIUM 100 MG: 100 CAPSULE, LIQUID FILLED ORAL at 21:09

## 2020-01-23 RX ADMIN — HYDROCODONE BITARTRATE AND ACETAMINOPHEN 2 TABLET: 5; 325 TABLET ORAL at 16:35

## 2020-01-23 RX ADMIN — HYDROCODONE BITARTRATE AND ACETAMINOPHEN 2 TABLET: 5; 325 TABLET ORAL at 08:16

## 2020-01-23 RX ADMIN — IPRATROPIUM BROMIDE AND ALBUTEROL SULFATE 1 AMPULE: .5; 3 SOLUTION RESPIRATORY (INHALATION) at 20:08

## 2020-01-23 RX ADMIN — Medication: at 08:12

## 2020-01-23 ASSESSMENT — PAIN DESCRIPTION - LOCATION
LOCATION: CHEST
LOCATION: CHEST

## 2020-01-23 ASSESSMENT — PAIN DESCRIPTION - ONSET
ONSET: SUDDEN
ONSET: ON-GOING
ONSET: PROGRESSIVE
ONSET: ON-GOING

## 2020-01-23 ASSESSMENT — PAIN SCALES - GENERAL
PAINLEVEL_OUTOF10: 7
PAINLEVEL_OUTOF10: 6
PAINLEVEL_OUTOF10: 7
PAINLEVEL_OUTOF10: 6
PAINLEVEL_OUTOF10: 8
PAINLEVEL_OUTOF10: 7
PAINLEVEL_OUTOF10: 0
PAINLEVEL_OUTOF10: 7
PAINLEVEL_OUTOF10: 8
PAINLEVEL_OUTOF10: 7
PAINLEVEL_OUTOF10: 7

## 2020-01-23 ASSESSMENT — PAIN DESCRIPTION - DESCRIPTORS
DESCRIPTORS: ACHING

## 2020-01-23 ASSESSMENT — PAIN - FUNCTIONAL ASSESSMENT
PAIN_FUNCTIONAL_ASSESSMENT: ACTIVITIES ARE NOT PREVENTED

## 2020-01-23 ASSESSMENT — PAIN DESCRIPTION - FREQUENCY
FREQUENCY: INTERMITTENT

## 2020-01-23 ASSESSMENT — PAIN DESCRIPTION - PROGRESSION
CLINICAL_PROGRESSION: GRADUALLY WORSENING
CLINICAL_PROGRESSION: GRADUALLY IMPROVING
CLINICAL_PROGRESSION: GRADUALLY WORSENING
CLINICAL_PROGRESSION: GRADUALLY WORSENING
CLINICAL_PROGRESSION: RAPIDLY WORSENING
CLINICAL_PROGRESSION: GRADUALLY IMPROVING

## 2020-01-23 ASSESSMENT — PAIN DESCRIPTION - ORIENTATION
ORIENTATION: MID
ORIENTATION: MID;UPPER

## 2020-01-23 ASSESSMENT — PAIN DESCRIPTION - PAIN TYPE
TYPE: SURGICAL PAIN
TYPE: SURGICAL PAIN
TYPE: ACUTE PAIN
TYPE: SURGICAL PAIN

## 2020-01-23 NOTE — PROGRESS NOTES
Physical Therapy    Physical Therapy Treatment Note  Name: Merari Ortiz MRN: [id number] :   1960   Date:  2020   Admission Date: 2020 Room:  -A   Restrictions/Precautions:        Sternal Precautions, SCDs, portable tele, BP cuff, pulse ox  Communication with other providers:  Figueroa Loredo RN states pt is ok to see for therapy. Subjective:  Patient states:  Pt is ready for his exercises and to \"get them out of the way. \" After completing exercises pt needed to use restroom. Pain:   Location, Type, Intensity (0/10 to 10/10):  0/10, but uncomfortable when he coughs  Objective:    Observation:  Pt was sitting in chair with feet flat on the floor  Treatment, including education/measures:  Vital Signs  HR: 75, B/P 131/82, O2 92% on room air  Education:  Pt was instructed in Sternal Precautions, Purpose of Exercise Program, Ganga Scale and Signs and Symptoms of Exercise Intolerance per Cardiac Protocol  Pt was instructed in Intermediate Cardiac ex program: sitting  Overhead Side Stretch x 10  Ankle Pumps/ Heel Raises x 10  Marching Seated x 10  Forward Arm Raises x 10  Side Arm Raises x 10  Arm Crosses x 10  Arm Circles Forwards and Backwards x 10  SittingTrunkTwist x 10  Transfers  Scooting : SBA with VC's to maintain sternal precautions and have feet touching the floor  Sit to stand : from chair: Min A with VC's to rock forward x 3 and stand using BLE. Pt swayed posteriorly after standing needing mod A to recover, instructed pt on leaning forward throughout sit to stand  From toilet pt was CGA of 1 with VC's  Stand to sit : SBA with VC's to not plop to chair and toilet  Gait 1:  Pt amb with no AD for 8+8 ft with CGA assist.  Pt needed VC's for pathway  Gait 2:  Pt amb with no AD for 200 ft with CGA assist. Pt was SOB and needed 2 standing rest breaks for sob  Pt needed VC's for pathway and proper breathing.   Assessment / Impression:    Patient's tolerance of treatment:  Good, pt tolerated all PTA  1/23/2020, 9:10 AM

## 2020-01-23 NOTE — PROGRESS NOTES
PATIENT NAME: Isiaas Arizmendi    TODAY'S DATE: 01/23/20    SUBJECTIVE:    Pt is POD # 3 s/p mini AVR, ascending aortic repair. Pt feeling well. Denies SOB. OBJECTIVE:   VITALS:    Vitals:    01/23/20 0902   BP: 131/82   Pulse: 88   Resp: 30   Temp:    SpO2: 94%     INTAKE/OUTPUT:    Date 01/23/20 0000 - 01/23/20 2359   Shift 6588-46342 6717-4978 5062-6196 24 Hour Total   INTAKE   P.O.  340  340   Shift Total(mL/kg)  340(2.5)  340(2.5)   OUTPUT   Urine(mL/kg/hr) 350(0.3)   350   Shift Total(mL/kg) 350(2.6)   350(2.6)   Weight (kg) 134 134 134 134      Patient Vitals for the past 96 hrs (Last 3 readings):   Weight   01/22/20 0515 295 lb 6.7 oz (134 kg)   01/21/20 0603 294 lb 15.6 oz (133.8 kg)   01/20/20 1526 279 lb 3.2 oz (126.6 kg)       EXAM:  Blood pressure 131/82, pulse 88, temperature 98.3 °F (36.8 °C), temperature source Oral, resp. rate 30, height 6' 2\" (1.88 m), weight (S) 295 lb 6.7 oz (134 kg), SpO2 94 %. General appearance: No apparent distress, appears stated age and cooperative. Skin: unremarkable  HEENT Normocephalic, atraumatic without obvious deformity. Neck: Supple, Trachea midline   Lungs: Good respiratory effort. Clear to auscultation, bilaterally  Heart: Regular rate/ rhythm inc c/d/i  Abdomen: Soft, non-tender or non-distended   Extremities: 1+ edema warm well perfused  Neurologic: Alert, grossly intact  Mental status: normal affect      Data:  CBC:   Recent Labs     01/21/20  0430 01/22/20  0421 01/23/20  0502   WBC 11.2* 9.7 8.1   HGB 9.6* 8.2* 7.2*   HCT 29.7* 26.0* 22.9*   PLT 57* 56* 63*     BMP:    Recent Labs     01/21/20  0430 01/22/20  0421 01/23/20  0502    135 135   K 3.9 4.1 3.8    102 100   CO2 23 26 25   BUN 20 19 20   CREATININE 1.2 1.2 1.0   GLUCOSE 154* 109* 110*     Hepatic: No results for input(s): AST, ALT, ALB, BILITOT, ALKPHOS in the last 72 hours.   Mag:      Recent Labs     01/21/20  0430 01/22/20  0421 01/23/20  0502   MG 1.8 2.2 2.3      Phos:   No results for input(s): PHOS in the last 72 hours. INR:   Recent Labs     01/20/20  1506   INR 1.26       Radiology Review:  CXR  Impression:     Stable mild interstitial edema and small bilateral pleural effusions. ASSESSMENT AND PLAN:    Patient Active Problem List   Diagnosis    GERD (gastroesophageal reflux disease)    Impotence    Coronary arteriosclerosis    BPH (benign prostatic hyperplasia)    Essential hypertension    Pure hypercholesterolemia    Aortic valve disease    Thoracic aortic aneurysm without rupture ([address]    COPD (chronic obstructive pulmonary disease) ([address]    Ascending aortic aneurysm (HCC)       S/P mini AVR, ascending aortic repair    Cardio: stable,increase coreg to 6.25. on PO amio. On eliquis for AVR. Pulm: stable on RA, encourage IS. GI: had BM  Renal: creatinine stable 1.0, adequate UOP. Continue lasix QD.    Wound: stable  PT/OT    Anticipate DC home tomorrow am.     Nataly Flanagan PA-C

## 2020-01-23 NOTE — PROGRESS NOTES
Dr. Everette Israel at bedside for daily rounds.  Orders received:    Coreg 3.125 mg PO x1 now  Lasix 20 mg IV x1 now  Coreg 6.25 mg PO BID  DC pacing wires  PA/Lat

## 2020-01-23 NOTE — PROGRESS NOTES
Seen by cardiac rehab status post AVR ascending aneurysm repair  POD 3   Patient is agreeable to a teaching session  Teaching done on activity guidelines and weight restrictions   Stressed balance in daily activity  encouraged to walk 4 times a day gradually increasing distance  Instructed to perform activities as with therapy 3 times a day  Advised to rest between activities but cautioned not to sit in one position more than 2 hours  stressed weight restrictions of no more than 5-10 lbs for 3 months and to continue sternal precautions and proper body mechanics for same time frame  Stressed continued frequent use of IS followed by controlled coughing  Reminded to wear support hose daily and remove at night and explained that they will need assistance to apply in am  Instructed that they can not drive or ride in the front seat until seen by surgeon  Explained rationale for avoiding extreme temperatures and for avoiding crowded areas      Teaching done on wound care and S/S and prevention of infection  stressed daily cleaning and inspection  Use of antibacterial soap  And separate washcloths  Instructed to take temperature daily and report temp over 101 to surgeon   Given Dial soap   Teaching done on SBE prophylaxis  Stressed notification of any future providers of any invasive procedure  Stressed regular dental care  Voiced understanding to all information provided  Will follow

## 2020-01-23 NOTE — CARE COORDINATION
PT/OT recommending home with HHC. Spoke to spouse and provided Medicare. gov information on HHC. No preference in provider. [facility] contacted thru [facility] with referral. Terrie Coyne RN    [id number] [facility] unable to accept due to insurance. [facility] called with referral; spoke to [place]. All [facility] faxed as requested to [phone]. Terrie Coyne RN    1100 Received call fro [place]; unable to accept referral. [facility] called; spoke to [place]. All [facility] faxed to .  Terrie Coyne RN

## 2020-01-24 VITALS
RESPIRATION RATE: 21 BRPM | OXYGEN SATURATION: 96 % | HEIGHT: 74 IN | BODY MASS INDEX: 38.56 KG/M2 | WEIGHT: 300.49 LBS | TEMPERATURE: 98.3 F | DIASTOLIC BLOOD PRESSURE: 60 MMHG | SYSTOLIC BLOOD PRESSURE: 131 MMHG | HEART RATE: 75 BPM

## 2020-01-24 LAB
ANION GAP SERPL CALCULATED.3IONS-SCNC: 11 MMOL/L (ref 4–16)
BUN BLDV-MCNC: 19 MG/DL (ref 6–23)
CALCIUM IONIZED: 4.12 MG/DL (ref 4.48–5.28)
CALCIUM SERPL-MCNC: 7.3 MG/DL (ref 8.3–10.6)
CHLORIDE BLD-SCNC: 100 MMOL/L (ref 99–110)
CO2: 27 MMOL/L (ref 21–32)
CREAT SERPL-MCNC: 1.1 MG/DL (ref 0.9–1.3)
GFR AFRICAN AMERICAN: >60 ML/MIN/1.73M2
GFR NON-AFRICAN AMERICAN: >60 ML/MIN/1.73M2
GLUCOSE BLD-MCNC: 101 MG/DL (ref 70–99)
IONIZED CA: 1.03 MMOL/L (ref 1.12–1.32)
MAGNESIUM: 2.4 MG/DL (ref 1.8–2.4)
POTASSIUM SERPL-SCNC: 3.9 MMOL/L (ref 3.5–5.1)
SODIUM BLD-SCNC: 138 MMOL/L (ref 135–145)

## 2020-01-24 PROCEDURE — 97530 THERAPEUTIC ACTIVITIES: CPT

## 2020-01-24 PROCEDURE — 2580000003 HC RX 258: Performed by: PHYSICIAN ASSISTANT

## 2020-01-24 PROCEDURE — 6370000000 HC RX 637 (ALT 250 FOR IP): Performed by: SURGERY

## 2020-01-24 PROCEDURE — 6360000002 HC RX W HCPCS: Performed by: PHYSICIAN ASSISTANT

## 2020-01-24 PROCEDURE — 83735 ASSAY OF MAGNESIUM: CPT

## 2020-01-24 PROCEDURE — 97116 GAIT TRAINING THERAPY: CPT

## 2020-01-24 PROCEDURE — 6370000000 HC RX 637 (ALT 250 FOR IP): Performed by: PHYSICIAN ASSISTANT

## 2020-01-24 PROCEDURE — 97110 THERAPEUTIC EXERCISES: CPT

## 2020-01-24 PROCEDURE — 94761 N-INVAS EAR/PLS OXIMETRY MLT: CPT

## 2020-01-24 PROCEDURE — 82330 ASSAY OF CALCIUM: CPT

## 2020-01-24 PROCEDURE — 80048 BASIC METABOLIC PNL TOTAL CA: CPT

## 2020-01-24 PROCEDURE — 94640 AIRWAY INHALATION TREATMENT: CPT

## 2020-01-24 RX ORDER — HYDROCODONE BITARTRATE AND ACETAMINOPHEN 5; 325 MG/1; MG/1
1 TABLET ORAL EVERY 8 HOURS PRN
Qty: 10 TABLET | Refills: 0 | Status: SHIPPED | OUTPATIENT
Start: 2020-01-24 | End: 2020-01-27

## 2020-01-24 RX ORDER — CARVEDILOL 6.25 MG/1
6.25 TABLET ORAL 2 TIMES DAILY WITH MEALS
Qty: 60 TABLET | Refills: 3 | Status: SHIPPED | OUTPATIENT
Start: 2020-01-24 | End: 2020-03-12 | Stop reason: SDUPTHER

## 2020-01-24 RX ORDER — FUROSEMIDE 20 MG/1
20 TABLET ORAL 2 TIMES DAILY
Qty: 30 TABLET | Refills: 1 | Status: SHIPPED | OUTPATIENT
Start: 2020-01-24 | End: 2020-02-26 | Stop reason: SDUPTHER

## 2020-01-24 RX ADMIN — CARVEDILOL 6.25 MG: 6.25 TABLET, FILM COATED ORAL at 08:51

## 2020-01-24 RX ADMIN — HYDROCODONE BITARTRATE AND ACETAMINOPHEN 2 TABLET: 5; 325 TABLET ORAL at 08:52

## 2020-01-24 RX ADMIN — DOCUSATE SODIUM 100 MG: 100 CAPSULE, LIQUID FILLED ORAL at 08:50

## 2020-01-24 RX ADMIN — SODIUM CHLORIDE, PRESERVATIVE FREE 10 ML: 5 INJECTION INTRAVENOUS at 08:52

## 2020-01-24 RX ADMIN — AMIODARONE HYDROCHLORIDE 200 MG: 200 TABLET ORAL at 08:51

## 2020-01-24 RX ADMIN — IPRATROPIUM BROMIDE AND ALBUTEROL SULFATE 1 AMPULE: .5; 3 SOLUTION RESPIRATORY (INHALATION) at 07:30

## 2020-01-24 RX ADMIN — APIXABAN 5 MG: 5 TABLET, FILM COATED ORAL at 08:50

## 2020-01-24 RX ADMIN — TAMSULOSIN HYDROCHLORIDE 0.4 MG: 0.4 CAPSULE ORAL at 08:50

## 2020-01-24 RX ADMIN — PANTOPRAZOLE SODIUM 40 MG: 40 TABLET, DELAYED RELEASE ORAL at 08:50

## 2020-01-24 RX ADMIN — HYDROCODONE BITARTRATE AND ACETAMINOPHEN 1 TABLET: 5; 325 TABLET ORAL at 00:52

## 2020-01-24 RX ADMIN — FUROSEMIDE 20 MG: 10 INJECTION, SOLUTION INTRAMUSCULAR; INTRAVENOUS at 08:52

## 2020-01-24 RX ADMIN — IPRATROPIUM BROMIDE AND ALBUTEROL SULFATE 1 AMPULE: .5; 3 SOLUTION RESPIRATORY (INHALATION) at 11:27

## 2020-01-24 RX ADMIN — MULTIPLE VITAMINS W/ MINERALS TAB 1 TABLET: TAB at 08:51

## 2020-01-24 RX ADMIN — Medication: at 08:51

## 2020-01-24 ASSESSMENT — PAIN DESCRIPTION - ONSET
ONSET: GRADUAL
ONSET: GRADUAL

## 2020-01-24 ASSESSMENT — PAIN SCALES - GENERAL
PAINLEVEL_OUTOF10: 0
PAINLEVEL_OUTOF10: 0
PAINLEVEL_OUTOF10: 8
PAINLEVEL_OUTOF10: 3
PAINLEVEL_OUTOF10: 0
PAINLEVEL_OUTOF10: 4
PAINLEVEL_OUTOF10: 4
PAINLEVEL_OUTOF10: 3

## 2020-01-24 ASSESSMENT — PAIN DESCRIPTION - PROGRESSION
CLINICAL_PROGRESSION: NOT CHANGED
CLINICAL_PROGRESSION: GRADUALLY WORSENING

## 2020-01-24 ASSESSMENT — PAIN DESCRIPTION - FREQUENCY
FREQUENCY: INTERMITTENT
FREQUENCY: INTERMITTENT

## 2020-01-24 ASSESSMENT — PAIN DESCRIPTION - DESCRIPTORS
DESCRIPTORS: ACHING;SORE
DESCRIPTORS: ACHING;SORE

## 2020-01-24 ASSESSMENT — PAIN DESCRIPTION - PAIN TYPE
TYPE: SURGICAL PAIN
TYPE: SURGICAL PAIN

## 2020-01-24 ASSESSMENT — PAIN DESCRIPTION - ORIENTATION
ORIENTATION: MID
ORIENTATION: MID

## 2020-01-24 ASSESSMENT — PAIN DESCRIPTION - LOCATION
LOCATION: CHEST
LOCATION: CHEST

## 2020-01-24 ASSESSMENT — PAIN - FUNCTIONAL ASSESSMENT
PAIN_FUNCTIONAL_ASSESSMENT: ACTIVITIES ARE NOT PREVENTED
PAIN_FUNCTIONAL_ASSESSMENT: ACTIVITIES ARE NOT PREVENTED

## 2020-01-24 NOTE — PROGRESS NOTES
Physical Therapy    Physical Therapy Treatment Note  Name: Vicente Salazar MRN: [id number] :   1960   Date:  2020   Admission Date: 2020 Room:  -A   Restrictions/Precautions:        Sternal Precautions, SCDs, portable tele, BP cuff, pulse ox  Communication with other providers:  Shaye blum RN states pt is ok to see for therapy. Subjective:  Patient states:  Pt is really tired today d/t not sleeping well throughout the night. Pt could not find his remote to adjust bed at night so he was stuck with HOB elevated all night. Pain:   Location, Type, Intensity (0/10 to 10/10):  0/10, but uncomfortable when he coughs. Objective:    Observation:  Pt was lying in bed in semi fowlers position  Treatment, including education/measures:  Vital Signs  HR: 75, B/P 131/82, O2 92% on room air  Education:  Pt was instructed in Sternal Precautions, Purpose of Exercise Program, Ganga Scale and Signs and Symptoms of Exercise Intolerance per Cardiac Protocol  Pt was instructed in Intermediate Cardiac ex program: sitting  Overhead Side Stretch x 10  Ankle Pumps/ Heel Raises x 10  Marching Seated x 10  Forward Arm Raises x 10  Side Arm Raises x 10  Arm Crosses x 10  Arm Circles Forwards and Backwards x 10  SittingTrunkTwist x 10  Transfers  Supine to Sit : Min A with swinging legs over bed and using elbows to push up while maintaining sternal precautions  Rolling : SBA with VC's to get completely on side before attempting to sit up  Scooting : SBA with VC's to maintain sternal precautions and have feet touching the floor  Sit to stand : from chair in room: CGA failed first attempt, instructed pt to lean forward more throughout sit to stand successful second attempt. From toilet pt was CGA of 1 with VC's  From chair in lobby pt was CGA failed first attempt, instructed pt to lean forward and scoot to edge more throughout sit to stand successful second attempt.     Stand to sit : SBA with VC's to not plop to chair and toilet  Gait 1:  Pt amb with no AD for 8+8 ft with CGA assist. Pt had to use restroom again later in the session with no AD 8+8 ft with CGA assist. Pt needed rest breaks after each trip to bathroom d/t SOB. Pt needed VC's for pathway  Gait 2:  Pt amb with no AD for 200 ft with CGA assist. Pt was SOB and needed 2 standing rest breaks for SOB  Pt needed VC's for pathway and proper breathing. Stairs : Pt ascended and descended 6 steps CGA assist with VC's to use railing and complete one step at a time. Assessment / Impression:    Patient's tolerance of treatment:  Good, pt tolerated all exercises and walked steadily without AD. Pt displays proper breathing technique throughout exercises, needs cueing to ensure COM is adequately shifted forward for sit to stand transfer. Pt tolerated steps. dverse Reaction: none  Significant change in status and impact:  none  Barriers to improvement:  Weakness and Endurance  Plan for Next Session:    Will cont to progress ex's and gt per cardiac protocol and educate pt on sternal precautions, S & S of ex intolerance, purpose of ex's, progression of ex's and gt at home.    Time in:  0840  Time out:  1020  Timed treatment minutes:  100  Total treatment time:  100  Previously filed items:  Social/Functional History  Lives With: Spouse, Son  Type of Home: House  Home Layout: Two level, Bed/Bath upstairs, 1/2 bath on main level  Home Access: Stairs to enter without rails  Entrance Stairs - Number of Steps: 1   Bathroom Shower/Tub: Walk-in shower  Bathroom Toilet: Standard  Bathroom Equipment: Grab bars in [address]: Lift chair(reports having soft/low furniture that pt already had difficulty standing from so recently purchased lift chair)  ADL Assistance: Independent  Homemaking Assistance: Independent  Ambulation Assistance: Independent  Transfer Assistance: Independent  Active : Yes  Occupation: Full time employment  Type of occupation:    Leisure & [employer]: camping   Additional Comments: wife works but plans to be home with pt for the first week   Short term goals  Time Frame for Short term goals: 1 week   Short term goal 1: Pt will perform sit><supine modA   Short term goal 2: Pt will transfer to bed/recliner SBA   Short term goal 3: Pt will ambulate 200ft with LRAD SBA   Short term goal 4: Pt will ascend/descend 12 steps, single rail SBA   Short term goal 5: Pt will complete cardiac HEP x 15 reps indep   Electronically signed by:    Arely Escamilla Student Physical Therapist Assistant  I have read and agree with the above documentation Page Chari August PTA  1/24/2020, 10:10 AM

## 2020-01-24 NOTE — PLAN OF CARE
Problem: Pain:  Goal: Pain level will decrease  Description  Pain level will decrease  Outcome: Ongoing  Goal: Control of acute pain  Description  Control of acute pain  Outcome: Ongoing  Goal: Control of chronic pain  Description  Control of chronic pain  Outcome: Ongoing     Problem: Discharge Planning:  Goal: Discharged to appropriate level of care  Description  Discharged to appropriate level of care  Outcome: Ongoing     Problem: Cardiac Output - Decreased:  Goal: Cardiac output within specified parameters  Description  Cardiac output within specified parameters  Outcome: Ongoing     Problem: Infection - Surgical Site:  Goal: Will show no infection signs and symptoms  Description  Will show no infection signs and symptoms  Outcome: Ongoing     Problem: Pain - Acute:  Goal: Pain level will decrease  Description  Pain level will decrease  Outcome: Ongoing     Problem: Venous Thromboembolism:  Goal: Will show no signs or symptoms of venous thromboembolism  Description  Will show no signs or symptoms of venous thromboembolism  Outcome: Ongoing  Goal: Absence of signs or symptoms of impaired coagulation  Description  Absence of signs or symptoms of impaired coagulation  Outcome: Ongoing     Problem: Risk for Impaired Skin Integrity  Goal: Tissue integrity - skin and mucous membranes  Description  Structural intactness and normal physiological function of skin and  mucous membranes.   Outcome: Ongoing     Problem: Falls - Risk of:  Goal: Will remain free from falls  Description  Will remain free from falls  Outcome: Ongoing  Goal: Absence of physical injury  Description  Absence of physical injury  Outcome: Ongoing

## 2020-01-24 NOTE — PROGRESS NOTES
Discharge Instructions: Cardiac Surgery    Diet: For the first 5 weeks to 2 months, fat and cholesterol allowances are made for strength and healing purposes. No salt is to be used when cooking or added at the table. We encourage chicken, fish, turkey, or lean pork for meat sources. Meats should be baked, broiled, roasted or grilled - NOT fried. Try to eat more whole foods, for example, eat a baked potato instead of [place] Yuliana. Eat an apple instead of apple pie. Please follow a low sodium diet. Restrict sodium intake to 1500 mg daily   (Diabetics should continue to follow what your doctor/dietician has advised). Comments: STAY AWAY FROM THE SALTY SIX: 1. BREADS/ROLLS 2. PIZZA 3. SANDWICHES 4. COLD CUTS/ CURED MEATS 5. SOUP OF ANY KIND 6. BURRITOS/ TACOS. ALSO AVOID CHEESE OF ANY KIND. Activities - Limitations:  · Take it easy for the next two weeks. No heavy lifting or work. Gradually increase activities as you tolerate it. · NO driving until you have seen your heart surgeon. Ride in back seat of car. Check with your surgeon before taking any car trips over one hour. Wear a seat belt to prevent injuries to your breastbone. Keep heart pillow between seat belt and chest.  · DO NOT ride on tractors, lawn mowers or golf carts until your heart surgeon gives you permission. · DO NOT lift objects over five to ten pounds. DO NOT push or pull heavy objects. · Avoid activity in extreme temperatures (<30 degrees or >80 Degrees) for the first few weeks. · Avoid crowds, such as in [employer] or theater for four weeks. · Avoid activity that strains the chest muscles for 6-8 weeks. · DO NOT bowl, golf or play tennis for 3 months. · DO NOT dive into pools, ride a conventional bike or motorcycle for 6-8 weeks  · DO NOT allow family or visitors to smoke in your home or car.   Comments:     Activities - Allowed:    · Use your breathing exerciser (incentive spirometer) 5 times every hour while awake, for hours a day-please call   [facility] Cardiac Intensive Care Unit:  [phone]    [facility]iac [facility] (inpatient): [phone] Monday-Friday 8-5PM.  Leave a message and they will return your call in a timely manner. [facility]iac [facility] (outpatient):   [phone]  Cardiac Rehab will contact you within 6-8 weeks after discharge. [employer] is a support group for cardiac patients. They met the 3rd Tuesday of each month from 6-8pm in Assembly Room A on the ground level here at [facility]. (No meetings in June or July). All cardiac patients are encouraged and welcome to attend. Any questions, please contact [phone]. Thank you for choosing [facility] as your open heart care provider. You may receive a telephone call from the [address] [place] [address] Vascular Intensive Care Unit within 48 hours of discharge inquiring as to your health and care received during your hospitalization. Please bring these instructions and a list of you medications  with you to your doctors' appointments. Electronically signed by Elsie Choudhury on 1/24/2020 at 2:19 PM    Home Record Sheet     We appreciate it is difficult, but you should take blood pressure at least twice daily in the morning and evening.  For each blood pressure recording, take two consecutive measurements at least 1 minute apart and whilst you are seated.  Do this for a minimum of 4 days, ideally for 7 days.  Weigh your self every day at the same time each day with the same clothes on after you have urinated and before you eat. Call [phone] for any increase in weight of 2-3 pounds in one day or 5 pounds over 3 days.      Date Time Systolic  (upper value)  Diastolic  (lower value)  Weight today Pulse  BM                                                                                                                                                              Heart Failure Weight Tracking Sheet  Monitor my Weight Gain    Weighing yourself every is very important. A weight gain of 2-3 pounds over one day or 5 pounds       over 3 days can be significant. The weight gain, accompanied by the following symptoms, may indicate that you are in Heart Failure. Please use this daily record to keep track and report your daily weight. REMEMBER NO MORE THAN 1500 MG OF SODIUM IN 24 HOURS        Weigh yourself each morning after you urinate and before you eat. Contact your surgeon for any increase in weight of 2-3 pounds in one day or 5 pounds over 3 days. Patient meeting discharge criteria. Paz HEALY assessed and discharged patient.      Sent home on ASA 81mg. Statin: Lipitor 20 mg. Betablocker:Coreg 6.25 mg. Patient educated on all medications and other discharge instructions.     EF: 55%     Discharge weight 300 lb             Last  BM: 1/23. CXR PA & LAT are complete.      Discharge pharmacy is [facility].  Updated in [facility]. Open until 24 hours.      AVS signed and copied. CABG education complete.      Medicare paperwork signed and with paper chart.      Belongings reviewed from admission list and sent home with pt. All belongings are accounted for.      Transportation with wife, confirmed  at 1400.      Home health care with [facility] confirmed to see patient on 1/25  All paper work faxed by case management.      Flu/pneumonia vaccine declined. CVC removed and dressing applied. Sternum and CT site dressings changed.      Incision appearance: sternum edges well approximated, clean, dry and intact; leg edges well approximated, clean, dry and intact; CVC site clean, dry and intact; CT/pacer wire site clean, dry and intact. All dressings new, clean, dry and intact. Pt sent home with dry island dressing and ABD/silk tape to change sternum and CT site dressings one more time tomorrow; educated on dressing changes.     Follow up appointment scheduled with Surgeon on Feb 18. Cardiology on 1/31.  Endocrine needs to

## 2020-01-24 NOTE — PROGRESS NOTES
Seen by cardiac rehab status post AVR and ascending aortic aneurysm  POD 4  Awake alert and up in chair  Wife at bedside  This RN has been working with patient and other family members this week but this is first chance to meet with wife Teaching done on need for adequate caloric and protein intake for strength and wound healing   Advised to avoid fried foods  Cautioned to avoid meats with high sodium content     Teaching done on need for adequate fiber and hydration for bowel motility  Patient has had BM since surgery     Teaching done on S/S and prevention of CHF  pre op EF 55%  Explained the deleterious effects of sodium on cardiovascular system   Stressed limiting sodium intake to 1500 mg daily  Instructed to read food labels to limit hidden sodium  Discussed the need to monitor portion size to avoid excess  sodium Explained the benefits of progressive activity  Instructed to weigh daily and report rapid weight gain accompanied by signs of CHF to surgeon  Given new scale  Provided chart for tracking weight and guidelines for calling surgeon      Teaching done on activity guidelines and weight restrictions   Stressed balance in daily activity  encouraged to walk 4 times a day gradually increasing distance  Instructed to perform activities as with therapy 3 times a day  Advised to rest between activities but cautioned not to sit in one position more than 2 hours  stressed weight restrictions of no more than 5-10 lbs for 3 months and to continue sternal precautions and proper body mechanics for same time frame  Stressed continued frequent use of IS followed by controlled coughing  Reminded to wear support hose daily and remove at night and explained that they will need assistance to apply in am  Instructed that they can not drive or ride in the front seat until seen by surgeon  Explained rationale for avoiding extreme temperatures and for avoiding crowded areas      Teaching done on wound care and S/S and

## 2020-01-24 NOTE — DISCHARGE SUMMARY
CLARITIN  TAKE 1 TABLET BY MOUTH EVERY DAY     montelukast 10 MG tablet  Commonly known as:  SINGULAIR  Take 1 tablet by mouth nightly     ranitidine 150 MG tablet  Commonly known as:  ZANTAC  TAKE 1 TABLET BY MOUTH TWICE A DAY     tamsulosin 0.4 MG capsule  Commonly known as:  FLOMAX  Take 1 capsule by mouth daily        STOP taking these medications    allopurinol 100 MG tablet  Commonly known as:  ZYLOPRIM     losartan 50 MG tablet  Commonly known as:  COZAAR     nitroGLYCERIN 0.4 MG SL tablet  Commonly known as:  Nitrostat     tadalafil 5 MG tablet  Commonly known as:  Cialis     triamterene-hydrochlorothiazide 37.5-25 MG per tablet  Commonly known as:  MAXZIDE-25           Where to Get Your Medications      These medications were sent to [address] [place] [address]. - P [phone] - F [phone]  [facility] [place] [id number]    Phone:  [phone]   · apixaban 5 MG Tabs tablet  · carvedilol 6.25 MG tablet  · furosemide 20 MG tablet     You can get these medications from any pharmacy    Bring a paper prescription for each of these medications  · HYDROcodone-acetaminophen 5-325 MG per tablet       Activity: activity as tolerated and no lifting, Driving, or Strenuous exercise until seen by physician in office  Diet: cardiac diet  Wound Care: as directed    Follow-up as directed at discharge    Signed: Marilee Barron    Time spent on discharge 35 minutes

## 2020-01-24 NOTE — PROGRESS NOTES
Discharge Instructions: Cardiac Surgery    Diet: For the first 5 weeks to 2 months, fat and cholesterol allowances are made for strength and healing purposes. No salt is to be used when cooking or added at the table. We encourage chicken, fish, turkey, or lean pork for meat sources. Meats should be baked, broiled, roasted or grilled - NOT fried. Try to eat more whole foods, for example, eat a baked potato instead of Western Pallavi Maximino Deck. Eat an apple instead of apple pie. Please follow a low sodium diet. Restrict sodium intake to 1500 mg daily   (Diabetics should continue to follow what your doctor/dietician has advised). Comments: Adequate caloric and protein intake for strength and wound healing  Adequate fiber and hydration for bowel motility      STAY AWAY FROM THE SALTY SIX: 1. BREADS/ROLLS 2. PIZZA 3. SANDWICHES 4. COLD CUTS/ CURED MEATS 5. SOUP OF ANY KIND 6. BURRITOS/ TACOS. ALSO AVOID CHEESE OF ANY KIND. Activities - Limitations:  · Take it easy for the next two weeks. No heavy lifting or work. Gradually increase activities as you tolerate it. · NO driving until you have seen your heart surgeon. Ride in back seat of car. Check with your surgeon before taking any car trips over one hour. Wear a seat belt to prevent injuries to your breastbone. Keep heart pillow between seat belt and chest.  · DO NOT ride on tractors, lawn mowers or golf carts until your heart surgeon gives you permission. · DO NOT lift objects over five to ten pounds. DO NOT push or pull heavy objects. · Avoid activity in extreme temperatures (<30 degrees or >80 Degrees) for the first few weeks. · Avoid crowds, such as in [employer] or theater for four weeks. · Avoid activity that strains the chest muscles for 6-8 weeks. · DO NOT bowl, golf or play tennis for 3 months.   · DO NOT dive into pools, ride a conventional bike or motorcycle for 6-8 weeks  · DO NOT allow family or visitors to smoke in your home or car.  Comments: DO NOT sit in one position more than 2 hours     Activities - Allowed:    · Use your breathing exerciser (incentive spirometer) 5 times every hour while awake, for the fist week. Second week and on use Incentive spirometer 5 times before each meal and at bedtime. To conserve energy use the incentive spirometer first, then arm/leg exercises and then walk around your house (increasing distances as you tolerate it) before each meal and bedtime and continue for 4 weeks. · Keep up your exercises started by physical therapy for 4 weeks, 3x/day. · Put on your white stockings (SIVAKUMAR hose) every morning and remove them at night. · You may climb stairs when you feel strong enough. You may resume sexual activity when you can climb two flights of stairs without getting short of breath. · You may have visitors; keep the number limited for the first few weeks. NO ONE with cold or flu symptoms or any sign of infection should visit. · Female patients may wear a bra to help keep breasts from pulling incision apart when you lie down. · Remember to continue to \"splint\" your chest as instructed after surgery, using your hands and your \"heart\" pillow when coughing or sneezing. · Remember to weigh daily and report rapid weight gain (greater than 2 lbs in one day or 5 lbs in 3 days) to surgeon   Comments:  Walk 4 times a day gradually increasing distance         Wound Care:  · Inspect your incisions daily for signs of redness, warmth, lumps or pus like drainage. If incision becomes very painful, has pus drainage, or you have chills or fever, take your temperature. Call the surgeon if temperature is >101 degrees or greater. · Leave your incision open to air. Wash daily with antibacterial soap (ex: Dial or safeguard). Bar soap should be for your use only or use a pump container. · DO NOT apply lotions, creams or oils to your incisions.   Leg where graft was removed may swell; elevating this leg may help decrease swelling. · You may shower. DO NOT take tub baths until ALL of your incisions are healed. Use separate wash cloth on chest incision and leg incision to prevent cross contamination. Wash incisions prior to the rest of your body. Comments:take temperature daily     Valve Care:          Notify any new doctors that you had valve surgery  They will order antibiotics if      Necessary before any procedures          Regular dental care is important  Notify your dentist of any valve surgery  wait 3       Months post op before any elective dental surgery            If you have an MRI let technician you have had valve surgery      ABSOLUTELY NO SMOKING:  If you have this habit it is VERY important that you quit! [place] [address] offers classes on smoking cessation and we can provide you with this information. [facility]: [phone]. NOTIFY YOUR DOCTOR:   · If you have swelling in feet, ankles, and sometimes abdomen, an ongoing cough, fatigue, and/or loss of energy call your surgeon. · Weigh yourself in the morning before eating breakfast and after urinating. Notify your surgeon if you have a weight gain of more than 3 (three) pounds within a day or two. · If your bowel movements are sluggish or have stopped. · If you have difficulty breathing. · If you develop angina (chest pain)  Or the same type of discomfort you had before surgery. · You may take nitroglycerine as prescribed, BUT you must still notify your cardiologist and your surgeon. Call 911 if pain is NOT relieved after taking 3 (three) nitroglycerine tablets, 5 minuets apart. · DO NOT DRIVE YOURSELF AND DO NOT HAVE SOMEONE DRIVE YOU TO THE EMERGENCY ROM. CALL 911.  :    Follow up care:    Call to make appointments today when you get home.     Referrals:  [address] [facility]    Given to Patient (When applicable):  {Yes Incentive Spirometer yes Valuables given to patient    yes Pillow N/A Surgical Bra   yes CHF information N/A Valve Card   yes SIVAKUMAR hose N/A Surgical Binder    yes \"Straight from the Heart\" Book yes Nutritional Information   Comments:IF THERE ARE IMMEDIATE CONCERNS OR LIFE THREATENING EMERGENCY, CALL 911    For non-emergency questions-24 hours a day-please call   [facility]:  [phone]    [facility] (inpatient): [phone] Monday-Friday 8-5PM.  Leave a message and they will return your call in a timely manner. [facility] (outpatient):   [phone]  Cardiac Rehab will contact you within 6-8 weeks after discharge. [employer] is a support group for cardiac patients. They met the 3rd Tuesday of each month from 6-8pm in Assembly Room A on the ground level here at [facility]. (No meetings in June or July). All cardiac patients are encouraged and welcome to attend. Any questions, please contact [phone]. Thank you for choosing [facility] as your open heart care provider. You may receive a telephone call from the [address] [place] [address] Vascular Intensive Care Unit within 48 hours of discharge inquiring as to your health and care received during your hospitalization. Please bring these instructions and a list of you medications  with you to your doctors' appointments.      Electronically signed by Jann Aragon RN on 1/24/2020 at 8:40 AM  .

## 2020-01-25 LAB
ABO/RH: NORMAL
ANTIBODY SCREEN: NEGATIVE
COMMENT: NORMAL
COMPONENT: NORMAL
CROSSMATCH RESULT: NORMAL
STATUS: NORMAL
THERMAL AMPLITUDE STUDY: NORMAL
TRANSFUSION STATUS: NORMAL
UNIT DIVISION: 0
UNIT NUMBER: NORMAL

## 2020-02-03 ENCOUNTER — HOSPITAL ENCOUNTER (OUTPATIENT)
Age: 60
Discharge: HOME | End: 2020-02-03
Payer: COMMERCIAL

## 2020-02-03 ENCOUNTER — HOSPITAL ENCOUNTER (OUTPATIENT)
Dept: GENERAL RADIOLOGY | Age: 60
Discharge: HOME | End: 2020-02-03
Payer: COMMERCIAL

## 2020-02-03 LAB
ANION GAP SERPL CALCULATED.3IONS-SCNC: 16 MMOL/L (ref 4–16)
BUN BLDV-MCNC: 16 MG/DL (ref 6–23)
CALCIUM SERPL-MCNC: 8.9 MG/DL (ref 8.3–10.6)
CHLORIDE BLD-SCNC: 100 MMOL/L (ref 99–110)
CO2: 23 MMOL/L (ref 21–32)
CREAT SERPL-MCNC: 1.3 MG/DL (ref 0.9–1.3)
GFR AFRICAN AMERICAN: >60 ML/MIN/1.73M2
GFR NON-AFRICAN AMERICAN: 57 ML/MIN/1.73M2
GLUCOSE BLD-MCNC: 87 MG/DL (ref 70–99)
HCT VFR BLD CALC: 32.1 % (ref 42–52)
HEMOGLOBIN: 9.5 GM/DL (ref 13.5–18)
MCH RBC QN AUTO: 27.8 PG (ref 27–31)
MCHC RBC AUTO-ENTMCNC: 29.6 % (ref 32–36)
MCV RBC AUTO: 93.9 FL (ref 78–100)
PDW BLD-RTO: 13.6 % (ref 11.7–14.9)
PLATELET # BLD: 445 K/CU MM (ref 140–440)
PMV BLD AUTO: 10.3 FL (ref 7.5–11.1)
POTASSIUM SERPL-SCNC: 4.8 MMOL/L (ref 3.5–5.1)
PRO-BNP: 606.4 PG/ML
RBC # BLD: 3.42 M/CU MM (ref 4.6–6.2)
SODIUM BLD-SCNC: 139 MMOL/L (ref 135–145)
WBC # BLD: 9.2 K/CU MM (ref 4–10.5)

## 2020-02-03 PROCEDURE — 85027 COMPLETE CBC AUTOMATED: CPT

## 2020-02-03 PROCEDURE — 80048 BASIC METABOLIC PNL TOTAL CA: CPT

## 2020-02-03 PROCEDURE — 83880 ASSAY OF NATRIURETIC PEPTIDE: CPT

## 2020-02-03 PROCEDURE — 71046 X-RAY EXAM CHEST 2 VIEWS: CPT

## 2020-02-03 PROCEDURE — 36415 COLL VENOUS BLD VENIPUNCTURE: CPT

## 2020-02-26 RX ORDER — DOXYCYCLINE HYCLATE 100 MG/1
100 CAPSULE ORAL DAILY
Qty: 90 CAPSULE | Refills: 1 | Status: SHIPPED | OUTPATIENT
Start: 2020-02-26 | End: 2020-03-12 | Stop reason: SDUPTHER

## 2020-02-26 RX ORDER — FUROSEMIDE 20 MG/1
20 TABLET ORAL 2 TIMES DAILY
Qty: 180 TABLET | Refills: 1 | Status: SHIPPED | OUTPATIENT
Start: 2020-02-26 | End: 2020-03-12 | Stop reason: SDUPTHER

## 2020-03-04 ENCOUNTER — HOSPITAL ENCOUNTER (OUTPATIENT)
Dept: CARDIAC REHAB | Age: 60
Setting detail: THERAPIES SERIES
Discharge: HOME | End: 2020-03-04
Payer: COMMERCIAL

## 2020-03-04 PROCEDURE — 93798 PHYS/QHP OP CAR RHAB W/ECG: CPT

## 2020-03-09 ENCOUNTER — HOSPITAL ENCOUNTER (OUTPATIENT)
Dept: CARDIAC REHAB | Age: 60
Setting detail: THERAPIES SERIES
Discharge: HOME | End: 2020-03-09
Payer: COMMERCIAL

## 2020-03-09 PROCEDURE — 93798 PHYS/QHP OP CAR RHAB W/ECG: CPT

## 2020-03-12 ENCOUNTER — OFFICE VISIT (OUTPATIENT)
Dept: FAMILY MEDICINE CLINIC | Age: 60
End: 2020-03-12
Payer: COMMERCIAL

## 2020-03-12 VITALS
SYSTOLIC BLOOD PRESSURE: 104 MMHG | HEIGHT: 62 IN | HEART RATE: 64 BPM | BODY MASS INDEX: 47.81 KG/M2 | WEIGHT: 259.8 LBS | DIASTOLIC BLOOD PRESSURE: 64 MMHG

## 2020-03-12 PROCEDURE — 99214 OFFICE O/P EST MOD 30 MIN: CPT | Performed by: FAMILY MEDICINE

## 2020-03-12 RX ORDER — IPRATROPIUM BROMIDE 42 UG/1
2 SPRAY, METERED NASAL 3 TIMES DAILY
Qty: 1 BOTTLE | Refills: 5 | Status: SHIPPED | OUTPATIENT
Start: 2020-03-12 | End: 2020-06-10 | Stop reason: SDUPTHER

## 2020-03-12 RX ORDER — CARVEDILOL 6.25 MG/1
6.25 TABLET ORAL 2 TIMES DAILY WITH MEALS
Qty: 180 TABLET | Refills: 1 | Status: SHIPPED | OUTPATIENT
Start: 2020-03-12 | End: 2020-06-10 | Stop reason: SDUPTHER

## 2020-03-12 RX ORDER — DOXYCYCLINE HYCLATE 100 MG/1
100 CAPSULE ORAL DAILY
Qty: 90 CAPSULE | Refills: 1 | Status: SHIPPED | OUTPATIENT
Start: 2020-03-12 | End: 2020-06-10 | Stop reason: SDUPTHER

## 2020-03-12 RX ORDER — CLOPIDOGREL BISULFATE 75 MG/1
75 TABLET ORAL DAILY
Qty: 90 TABLET | Refills: 1 | Status: SHIPPED | OUTPATIENT
Start: 2020-03-12 | End: 2020-06-10 | Stop reason: SDUPTHER

## 2020-03-12 RX ORDER — MONTELUKAST SODIUM 10 MG/1
10 TABLET ORAL NIGHTLY
Qty: 90 TABLET | Refills: 1 | Status: SHIPPED | OUTPATIENT
Start: 2020-03-12 | End: 2020-06-10 | Stop reason: SDUPTHER

## 2020-03-12 RX ORDER — FUROSEMIDE 20 MG/1
20 TABLET ORAL 2 TIMES DAILY
Qty: 180 TABLET | Refills: 1 | Status: SHIPPED | OUTPATIENT
Start: 2020-03-12 | End: 2020-06-10 | Stop reason: SDUPTHER

## 2020-03-12 RX ORDER — RANITIDINE 150 MG/1
150 TABLET ORAL 2 TIMES DAILY
Qty: 180 TABLET | Refills: 1 | Status: SHIPPED | OUTPATIENT
Start: 2020-03-12 | End: 2021-04-22 | Stop reason: SDUPTHER

## 2020-03-12 RX ORDER — TADALAFIL 5 MG/1
5 TABLET ORAL DAILY
Qty: 90 TABLET | Refills: 1 | Status: SHIPPED | OUTPATIENT
Start: 2020-03-12 | End: 2020-06-10 | Stop reason: SDUPTHER

## 2020-03-12 RX ORDER — LORATADINE 10 MG/1
TABLET ORAL
Qty: 90 TABLET | Refills: 1 | Status: SHIPPED | OUTPATIENT
Start: 2020-03-12 | End: 2020-06-10 | Stop reason: SDUPTHER

## 2020-03-12 RX ORDER — ATORVASTATIN CALCIUM 20 MG/1
20 TABLET, FILM COATED ORAL NIGHTLY
Qty: 90 TABLET | Refills: 1 | Status: SHIPPED | OUTPATIENT
Start: 2020-03-12 | End: 2020-06-10 | Stop reason: SDUPTHER

## 2020-03-12 RX ORDER — TADALAFIL 5 MG/1
5 TABLET ORAL DAILY
COMMUNITY
End: 2020-03-12 | Stop reason: SDUPTHER

## 2020-03-12 RX ORDER — TAMSULOSIN HYDROCHLORIDE 0.4 MG/1
0.4 CAPSULE ORAL DAILY
Qty: 90 CAPSULE | Refills: 1 | Status: SHIPPED | OUTPATIENT
Start: 2020-03-12 | End: 2020-06-10 | Stop reason: SDUPTHER

## 2020-03-12 ASSESSMENT — PATIENT HEALTH QUESTIONNAIRE - PHQ9
1. LITTLE INTEREST OR PLEASURE IN DOING THINGS: 0
2. FEELING DOWN, DEPRESSED OR HOPELESS: 0
SUM OF ALL RESPONSES TO PHQ QUESTIONS 1-9: 0
SUM OF ALL RESPONSES TO PHQ QUESTIONS 1-9: 0
SUM OF ALL RESPONSES TO PHQ9 QUESTIONS 1 & 2: 0

## 2020-03-13 NOTE — PROGRESS NOTES
disease) ([facility]     Hx: smoking - no pulmonologist as of 1/14/20    Coronary arteriosclerosis 7/27/2019    Dr. Lelia Herron GERD (gastroesophageal reflux disease) 07/27/2019    Takes Zantac    Impotence 7/27/2019    Skin abnormality     on nose - takes Doxycycline    Thoracic aortic aneurysm (HCC)     Wears dentures     top & bottom       FAMILY HISTORY  Family History   Problem Relation Age of Onset   Henrik Davis Stroke Mother     Hypertension Mother     Coronary Art Dis Other 64        uncle       SOCIAL HISTORY  Social History     Socioeconomic History    Marital status:      Spouse name: Not on file    Number of children: Not on file    Years of education: Not on file    Highest education level: Not on file   Occupational History    Not on file   Social Needs    Financial resource strain: Not on file    Food insecurity     Worry: Not on file     Inability: Not on file    Transportation needs     Medical: Not on file     Non-medical: Not on file   Tobacco Use    Smoking status: Former Smoker     Packs/day: 2.50     Years: 41.00     Pack years: 102.50     Types: Cigarettes     Start date: 1976     Last attempt to quit: 1/1/2017     Years since quitting: 3.1    Smokeless tobacco: Never Used   Substance and Sexual Activity    Alcohol use:  Yes     Alcohol/week: 3.0 standard drinks     Types: 3 Shots of liquor per week    Drug use: No    Sexual activity: Not on file   Lifestyle    Physical activity     Days per week: Not on file     Minutes per session: Not on file    Stress: Not on file   Relationships    Social connections     Talks on phone: Not on file     Gets together: Not on file     Attends Tenriism service: Not on file     Active member of club or organization: Not on file     Attends meetings of clubs or organizations: Not on file     Relationship status: Not on file    Intimate partner violence     Fear of current or ex partner: Not on file     Emotionally abused: Not on file Physically abused: Not on file     Forced sexual activity: Not on file   Other Topics Concern    Not on file   Social History Narrative    Not on file        SURGICAL HISTORY  Past Surgical History:   Procedure Laterality Date    AORTIC VALVE REPLACEMENT N/A 01/20/2020    Dr. Meghann Nicolas N/A 1/20/2020    MINIMALLY INVASIVE AORTIC VALVE REPLACEMENT, INTRAOPERATIVE RAY, INDUCED HYPOTHERMIA performed by Bigg Shay MD at [address]  2012   [address]  2012    COLONOSCOPY  2012    Normal exam per pt - Dr. Debra Kim?     COLONOSCOPY  2017    CORONARY ANGIOPLASTY WITH STENT PLACEMENT      2010 - Dr. Lita Schumacher;   2017 - (RCA) by Dr. Christo Weinstein Right 1990's    Inguinal    LUMBAR DISCECTOMY  03/2004    L4- L5    OTHER SURGICAL HISTORY      genital warts excised    THORACIC AORTIC ANEURYSM REPAIR N/A 1/20/2020    THORACIC ASCENDING AORTIC ANEURYSM REPAIR performed by Bigg Shay MD at [address] [facility]  Current Outpatient Medications   Medication Sig Dispense Refill    tadalafil (CIALIS) 5 MG tablet Take 1 tablet by mouth daily 90 tablet 1    doxycycline hyclate (VIBRAMYCIN) 100 MG capsule Take 1 capsule by mouth daily 90 capsule 1    furosemide (LASIX) 20 MG tablet Take 1 tablet by mouth 2 times daily 180 tablet 1    carvedilol (COREG) 6.25 MG tablet Take 1 tablet by mouth 2 times daily (with meals) 180 tablet 1    raNITIdine (ZANTAC) 150 MG tablet Take 1 tablet by mouth 2 times daily 180 tablet 1    montelukast (SINGULAIR) 10 MG tablet Take 1 tablet by mouth nightly 90 tablet 1    tamsulosin (FLOMAX) 0.4 MG capsule Take 1 capsule by mouth daily 90 capsule 1    loratadine (CLARITIN) 10 MG tablet TAKE 1 TABLET BY MOUTH EVERY DAY 90 tablet 1    atorvastatin (LIPITOR) 20 MG tablet Take 1 tablet by mouth nightly 90 tablet 1    clopidogrel (PLAVIX) 75 MG tablet Take 1 tablet by mouth daily 90 tablet 1   

## 2020-03-19 ENCOUNTER — OFFICE VISIT (OUTPATIENT)
Dept: FAMILY MEDICINE CLINIC | Age: 60
End: 2020-03-19
Payer: COMMERCIAL

## 2020-03-19 VITALS
DIASTOLIC BLOOD PRESSURE: 68 MMHG | HEIGHT: 71 IN | BODY MASS INDEX: 36.54 KG/M2 | HEART RATE: 61 BPM | WEIGHT: 261 LBS | TEMPERATURE: 98.6 F | SYSTOLIC BLOOD PRESSURE: 104 MMHG | OXYGEN SATURATION: 97 %

## 2020-03-19 DIAGNOSIS — I10 ESSENTIAL HYPERTENSION: ICD-10-CM

## 2020-03-19 DIAGNOSIS — E78.00 PURE HYPERCHOLESTEROLEMIA: ICD-10-CM

## 2020-03-19 LAB
A/G RATIO: 1.4 (ref 1.1–2.2)
ALBUMIN SERPL-MCNC: 3.6 G/DL (ref 3.4–5)
ALP BLD-CCNC: 94 U/L (ref 40–129)
ALT SERPL-CCNC: 11 U/L (ref 10–40)
ANION GAP SERPL CALCULATED.3IONS-SCNC: 11 MMOL/L (ref 3–16)
AST SERPL-CCNC: 11 U/L (ref 15–37)
BASOPHILS ABSOLUTE: 0 K/UL (ref 0–0.2)
BASOPHILS RELATIVE PERCENT: 0.5 %
BILIRUB SERPL-MCNC: 0.4 MG/DL (ref 0–1)
BUN BLDV-MCNC: 13 MG/DL (ref 7–20)
CALCIUM SERPL-MCNC: 8.9 MG/DL (ref 8.3–10.6)
CHLORIDE BLD-SCNC: 107 MMOL/L (ref 99–110)
CHOLESTEROL, TOTAL: 106 MG/DL (ref 0–199)
CO2: 26 MMOL/L (ref 21–32)
CREAT SERPL-MCNC: 1.1 MG/DL (ref 0.9–1.3)
EOSINOPHILS ABSOLUTE: 0.4 K/UL (ref 0–0.6)
EOSINOPHILS RELATIVE PERCENT: 6.3 %
GFR AFRICAN AMERICAN: >60
GFR NON-AFRICAN AMERICAN: >60
GLOBULIN: 2.5 G/DL
GLUCOSE BLD-MCNC: 106 MG/DL (ref 70–99)
HCT VFR BLD CALC: 36.3 % (ref 40.5–52.5)
HDLC SERPL-MCNC: 37 MG/DL (ref 40–60)
HEMOGLOBIN: 11.8 G/DL (ref 13.5–17.5)
LDL CHOLESTEROL CALCULATED: 51 MG/DL
LYMPHOCYTES ABSOLUTE: 1.8 K/UL (ref 1–5.1)
LYMPHOCYTES RELATIVE PERCENT: 31 %
MCH RBC QN AUTO: 27.8 PG (ref 26–34)
MCHC RBC AUTO-ENTMCNC: 32.6 G/DL (ref 31–36)
MCV RBC AUTO: 85.4 FL (ref 80–100)
MONOCYTES ABSOLUTE: 0.4 K/UL (ref 0–1.3)
MONOCYTES RELATIVE PERCENT: 7.6 %
NEUTROPHILS ABSOLUTE: 3.1 K/UL (ref 1.7–7.7)
NEUTROPHILS RELATIVE PERCENT: 54.6 %
PDW BLD-RTO: 14.5 % (ref 12.4–15.4)
PLATELET # BLD: 166 K/UL (ref 135–450)
PMV BLD AUTO: 9.2 FL (ref 5–10.5)
POTASSIUM SERPL-SCNC: 4.3 MMOL/L (ref 3.5–5.1)
RBC # BLD: 4.25 M/UL (ref 4.2–5.9)
SODIUM BLD-SCNC: 144 MMOL/L (ref 136–145)
TOTAL PROTEIN: 6.1 G/DL (ref 6.4–8.2)
TRIGL SERPL-MCNC: 92 MG/DL (ref 0–150)
VLDLC SERPL CALC-MCNC: 18 MG/DL
WBC # BLD: 5.8 K/UL (ref 4–11)

## 2020-03-19 PROCEDURE — 99213 OFFICE O/P EST LOW 20 MIN: CPT | Performed by: FAMILY MEDICINE

## 2020-03-19 RX ORDER — ALBUTEROL SULFATE 90 UG/1
2 AEROSOL, METERED RESPIRATORY (INHALATION) 4 TIMES DAILY PRN
Qty: 1 INHALER | Refills: 5 | Status: SHIPPED | OUTPATIENT
Start: 2020-03-19 | End: 2020-06-10 | Stop reason: SDUPTHER

## 2020-03-19 RX ORDER — SULFAMETHOXAZOLE AND TRIMETHOPRIM 400; 80 MG/1; MG/1
1 TABLET ORAL 2 TIMES DAILY
Qty: 20 TABLET | Refills: 0 | Status: SHIPPED | OUTPATIENT
Start: 2020-03-19 | End: 2020-03-29

## 2020-03-19 ASSESSMENT — ENCOUNTER SYMPTOMS
SHORTNESS OF BREATH: 0
CHEST TIGHTNESS: 0
SINUS PRESSURE: 0
RHINORRHEA: 0
SORE THROAT: 0

## 2020-03-19 NOTE — PROGRESS NOTES
Cough and Nasal Congestion  Coughing spells worst at night, sometimes in the morning, productive of white sputum and sometimes colored sputum  3/19/2020    Randi Mortimer    Chief Complaint   Patient presents with    Cough     nonproductive, chest \"feels sore\", nasal congestion, - continuing sx's - unchanged. HPI  Vinod Trinidad is a 64 y.o. male who presents today for follow up:    A follow-up on last week's appointment. Please see [employer] notes oddly just above and below this note. REVIEW OF SYMPTOMS  Review of Systems   Constitutional: Negative for chills and fever. HENT: Negative for ear pain, rhinorrhea, sinus pressure and sore throat. Respiratory: Negative for chest tightness and shortness of breath. Musculoskeletal: Negative for myalgias.        PAST MEDICAL HISTORY  Past Medical History:   Diagnosis Date    Aortic valve disease     Dr. Neil Ibarra BPH (benign prostatic hyperplasia) 7/27/2019    [facility]chronic obstructive [facility]) ([facility]     Hx: smoking - no pulmonologist as of 1/14/20    Coronary arteriosclerosis 7/27/2019    Dr. Neil Ibarra GERD (gastroesophageal reflux disease) 07/27/2019    Takes Zantac    Impotence 7/27/2019    Skin abnormality     on nose - takes Doxycycline    Thoracic aortic aneurysm (HCC)     Wears dentures     top & bottom       FAMILY HISTORY  Family History   Problem Relation Age of Onset    Stroke Mother     Hypertension Mother     Coronary Art Dis Other 64        uncle       SOCIAL HISTORY  Social History     Socioeconomic History    Marital status:      Spouse name: Not on file    Number of children: Not on file    Years of education: Not on file    Highest education level: Not on file   Occupational History    Not on file   Social Needs    Financial resource strain: Not on file    Food insecurity     Worry: Not on file     Inability: Not on file    Transportation needs     Medical: Not on file     Non-medical: Not on file and nursing note reviewed. Constitutional:       Appearance: He is well-developed. HENT:      Right Ear: External ear normal.      Left Ear: External ear normal.   Neck:      Musculoskeletal: Neck supple. Cardiovascular:      Rate and Rhythm: Normal rate and regular rhythm. Heart sounds: Normal heart sounds. Pulmonary:      Effort: Pulmonary effort is normal.      Breath sounds: Normal breath sounds. ASSESSMENT & PLAN  1. Cough  Differential was subacute sinusitis versus sinus congestion plus COPD. Will treat with antibiotic inhaler and saline nasal spray. - sulfamethoxazole-trimethoprim (BACTRIM) 400-80 MG per tablet; Take 1 tablet by mouth 2 times daily for 10 days  Dispense: 20 tablet; Refill: 0  - albuterol sulfate HFA (VENTOLIN HFA) 108 (90 Base) MCG/ACT inhaler; Inhale 2 puffs into the lungs 4 times daily as needed for Wheezing  Dispense: 1 Inhaler; Refill: 5    2. Pulmonary emphysema, unspecified emphysema type ([address]  See above. Patient may need to start a daily preventative like Anoro    3. Sinus congestion  See above          Follow-up routine in 6-day weeks                 Electronically signed by Blair Ball MD on 3/19/2020        . Patient has a history of COPD. He states that he hasn't used any inhalers for the last 3 years. Last CXR 2/3/20 shows no acute cardiopulmonary process. 1 week ago patient was tried on treatment for vasomotor rhinitis using nasal Atrovent spray. Patient noticed no improvement with it.

## 2020-04-28 ENCOUNTER — HOSPITAL ENCOUNTER (EMERGENCY)
Age: 60
Discharge: HOME | End: 2020-04-28
Attending: EMERGENCY MEDICINE
Payer: COMMERCIAL

## 2020-04-28 ENCOUNTER — APPOINTMENT (OUTPATIENT)
Dept: GENERAL RADIOLOGY | Age: 60
End: 2020-04-28
Payer: COMMERCIAL

## 2020-04-28 VITALS
BODY MASS INDEX: 32.85 KG/M2 | DIASTOLIC BLOOD PRESSURE: 77 MMHG | TEMPERATURE: 97.9 F | HEART RATE: 69 BPM | WEIGHT: 256 LBS | RESPIRATION RATE: 18 BRPM | OXYGEN SATURATION: 98 % | SYSTOLIC BLOOD PRESSURE: 148 MMHG | HEIGHT: 74 IN

## 2020-04-28 PROCEDURE — 71045 X-RAY EXAM CHEST 1 VIEW: CPT

## 2020-04-28 PROCEDURE — 99283 EMERGENCY DEPT VISIT LOW MDM: CPT

## 2020-04-28 RX ORDER — PREDNISONE 20 MG/1
60 TABLET ORAL DAILY
Qty: 15 TABLET | Refills: 0 | Status: SHIPPED | OUTPATIENT
Start: 2020-04-28 | End: 2020-05-03

## 2020-04-28 RX ORDER — GUAIFENESIN AND CODEINE PHOSPHATE 100; 10 MG/5ML; MG/5ML
5 SOLUTION ORAL 3 TIMES DAILY PRN
Qty: 100 ML | Refills: 0 | Status: SHIPPED | OUTPATIENT
Start: 2020-04-28 | End: 2020-05-05

## 2020-04-28 RX ORDER — AZITHROMYCIN 250 MG/1
250 TABLET, FILM COATED ORAL SEE ADMIN INSTRUCTIONS
Qty: 6 TABLET | Refills: 0 | Status: SHIPPED | OUTPATIENT
Start: 2020-04-28 | End: 2020-05-03

## 2020-04-28 RX ORDER — BENZONATATE 100 MG/1
200 CAPSULE ORAL 3 TIMES DAILY PRN
Qty: 30 CAPSULE | Refills: 0 | Status: SHIPPED | OUTPATIENT
Start: 2020-04-28 | End: 2020-05-08

## 2020-04-28 NOTE — ED PROVIDER NOTES
Triage Chief Complaint:   Cough ( 1 DAY, NON PRODUCTIVE)      [place]:  Cade Amezquita is a 64 y.o. male that presents for:    Chief complaint:  Cough    Location:  Chest    Quality/Characteristic:  Non-productive. Duration:  States has had a cough since the end of January when he had a AVR surgery    Aggravating/Alleviating factors:  Saw his PCP in Feb and had neg cxr and also told to take claritin and atrovent nasal spray. Then last month had teledoc consult and given zpak, inhaler, tessalon which helped the symptoms. Just filled his inhaler recently, that is helping. Ran out of tessalon. No steroids. Associated symptoms:  No fever   No chest pain, dyspnea, SOB, PND, orthopnea, edema, pleuritic discomfort. No wheeze. No abdominal pain, nausea, vomiting, diarrhea. No urinary complaints. Severity:  Moderate        ROS:    Constitutional: No fevers/chills. No weight changes. No night sweats. Eyes:  No blurry vision or double vision. No drainage. Ears, Nose, Throat:  No hearing changes. No rhinitis. No sore throat. Cardiac: No chest pain or pressure. No arm/jaw pain. No palpitations. No lightheadedness. No claudication symptoms. Respiratory:  No dyspnea. No hemoptysis. +COUGH AS ABOVE. GI: No abdominal pain. No n/v/d. No hematochezia or melena. :  No hematuria. No dysuria. No discharge. MSK:  No joint pain or swelling. No lower extremity swelling. Skin:  No rashes. No pruritis. Neuro:  No numbness, tingling or weakness in any extremity or face. No headache. No incoordination. No speech difficulties. No seizures. At least 10 point systems reviewed and otherwise acutely negative except as in the [address]     Past Medical History:   Diagnosis Date    Aortic valve disease     Dr. Keyla Bowles BPH (benign prostatic hyperplasia) 7/27/2019    COPD (chronic obstructive pulmonary disease) ([facility]     Hx: smoking - no pulmonologist as of 1/14/20    Coronary arteriosclerosis 7/27/2019    Dr. Crissy Masters GERD (gastroesophageal reflux disease) 07/27/2019    Takes Zantac    Impotence 7/27/2019    Skin abnormality     on nose - takes Doxycycline    Thoracic aortic aneurysm ([address]     Wears dentures     top & bottom     Past Surgical History:   Procedure Laterality Date    AORTIC VALVE REPLACEMENT N/A 01/20/2020    Dr. Surendra Duran N/A 1/20/2020    MINIMALLY INVASIVE AORTIC VALVE REPLACEMENT, INTRAOPERATIVE RAY, INDUCED HYPOTHERMIA performed by Becky White MD at [id number] [place] [address] Blvd  2012   [address]  2012    COLONOSCOPY  2012    Normal exam per pt - Dr. Sanjuana Enriquez?     COLONOSCOPY  2017    CORONARY ANGIOPLASTY WITH STENT PLACEMENT      2010 - Dr. Hossein Dumont;   2017 - (RCA) by Dr. Obinna Rubio Right 1990's    Inguinal    LUMBAR DISCECTOMY  03/2004    L4- L5    OTHER SURGICAL HISTORY      genital warts excised    THORACIC AORTIC ANEURYSM REPAIR N/A 1/20/2020    THORACIC ASCENDING AORTIC ANEURYSM REPAIR performed by Becky White MD at [facility]     Family History   Problem Relation Age of Onset   [de-identified] Stroke Mother     Hypertension Mother     Coronary Art Dis Other 64        uncle     Social History     Socioeconomic History    Marital status:      Spouse name: Not on file    Number of children: Not on file    Years of education: Not on file    Highest education level: Not on file   Occupational History    Not on file   Social Needs    Financial resource strain: Not on file    Food insecurity     Worry: Not on file     Inability: Not on file   [employer] needs     Medical: Not on file     Non-medical: Not on file   Tobacco Use    Smoking status: Former Smoker     Packs/day: 2.50     Years: 41.00     Pack years: 102.50     Types: Cigarettes     Start date: 1976     Last attempt to quit: 1/1/2017     Years since quitting: 3.3    Smokeless tobacco: Never Used   Substance and Sexual Activity    Alcohol use: Yes     Alcohol/week: 3.0 standard drinks     Types: 3 Shots of liquor per week     Comment: occaisional    Drug use: No    Sexual activity: Not on file   Lifestyle    Physical activity     Days per week: Not on file     Minutes per session: Not on file    Stress: Not on file   Relationships    Social connections     Talks on phone: Not on file     Gets together: Not on file     Attends [employer] service: Not on file     Active member of club or organization: Not on file     Attends meetings of clubs or organizations: Not on file     Relationship status: Not on file    Intimate partner violence     Fear of current or ex partner: Not on file     Emotionally abused: Not on file     Physically abused: Not on file     Forced sexual activity: Not on file   Other Topics Concern    Not on file   Social History Narrative    Not on file     No current facility-administered medications for this encounter. Current Outpatient Medications   Medication Sig Dispense Refill    azithromycin (ZITHROMAX) 250 MG tablet Take 1 tablet by mouth See Admin Instructions for 5 days 500mg on day 1 followed by 250mg on days 2 - 5 6 tablet 0    benzonatate (TESSALON PERLES) 100 MG capsule Take 2 capsules by mouth 3 times daily as needed for Cough 30 capsule 0    guaiFENesin-codeine (TUSSI-ORGANIDIN NR) 100-10 MG/5ML syrup Take 5 mLs by mouth 3 times daily as needed for Cough for up to 7 days.  100 mL 0    predniSONE (DELTASONE) 20 MG tablet Take 3 tablets by mouth daily for 5 days 15 tablet 0    albuterol sulfate HFA (VENTOLIN HFA) 108 (90 Base) MCG/ACT inhaler Inhale 2 puffs into the lungs 4 times daily as needed for Wheezing 1 Inhaler 5    tadalafil (CIALIS) 5 MG tablet Take 1 tablet by mouth daily 90 tablet 1    doxycycline hyclate (VIBRAMYCIN) 100 MG capsule Take 1 capsule by mouth daily 90 capsule 1    furosemide (LASIX) 20 MG tablet Take 1 tablet by mouth 2 times daily 180 tablet 1    carvedilol (COREG) appearance, normal BS, soft, no abd tenderness, no guarding, no  organomegaly, no abd masses. RECTAL: deffered  EXTREMITIES: no joint swelling\tenderness, no edema, normal ROM. SKIN: warm, dry, good color, no rash. NEURO: Alert and oriented, motor intact, sensory intact. I have reviewed and interpreted all of the currently available lab results from this visit (if applicable):  No results found for this visit on 04/28/20. Radiographs:  [] Radiologist's Report Reviewed:   XR CHEST PORTABLE   Final Result          EKG: (All EKG's are interpreted by myself in the absence of a cardiologist)    MDM:  Continue Claritin as well as nasal spray as instructed by your primary care physician. Also take over-the-counter anti-cough medications and expectorant such as Robitussin-DM and Mucinex. Take Tessalon Perles as well as codeine elixir as prescribed for persistent cough. Use inhaler for cough or wheeze or shortness of breath. For fevers, muscle aches, discomfort alternate Tylenol every 4 hours with Ibuprofen every 8 hours. Increase fluids with electrolytes to stay hydrated. Follow-up with primary care physician within 3 to 5 days or return to the ER for any worsening symptoms in any way.  ------------------------------------------------      Final Impression:  1.  Cough        Discharge referral (if applicable):  Jason Diez MD  [facility] [phone]    In 3 days        Discharge medications (if applicable):  Discharge Medication List as of 4/28/2020  6:41 PM      START taking these medications    Details   azithromycin (ZITHROMAX) 250 MG tablet Take 1 tablet by mouth See Admin Instructions for 5 days 500mg on day 1 followed by 250mg on days 2 - 5, Disp-6 tablet, R-0Print      benzonatate (TESSALON PERLES) 100 MG capsule Take 2 capsules by mouth 3 times daily as needed for Cough, Disp-30 capsule, R-0Print      guaiFENesin-codeine (TUSSI-ORGANIDIN NR) 100-10 MG/5ML

## 2020-04-29 ENCOUNTER — TELEPHONE (OUTPATIENT)
Dept: OTHER | Facility: CLINIC | Age: 60
End: 2020-04-29

## 2020-04-29 NOTE — TELEPHONE ENCOUNTER
Patient contacted regarding recent visit for viral symptoms. This Kerri Solano contacted the patient by telephone to perform post discharge call. Verified name and  with patient as identifiers. Provided introduction to self, and reason for call due to viral symptoms of infection and/or exposure to COVID-19. Patient presented to emergency department/flu clinic with complaints of viral symptoms/exposure to COVID. Patient reports symptoms are the same. Due to no new or worsening symptoms the RN CTN/ACLEFTY was not notified for escalation. Discussed exposure protocols and quarantine with CDC Guidelines What To Do If You Are Sick    Patient was given an opportunity for questions and concerns. Stay home except to get medical care    Separate yourself from other people and animals in your home    Call ahead before visiting your doctor    Wear a facemask    Cover your coughs and sneezes    Clean your hands often    Avoid sharing personal household items    Clean all high-touch surfaces everyday    Monitor your symptoms  Seek prompt medical attention if your illness is worsening (e.g., difficulty breathing). Before seeking care, call your healthcare provider and tell them that you have, or are being evaluated for, COVID-19. Put on a facemask before you enter the facility. These steps will help the healthcare provider's office to keep other people in the office or waiting room from getting infected or exposed. Ask your healthcare provider to call the local or state health department. Persons who are placed under If you have a medical emergency and need to call 911, notify the dispatch personnel that you have, or are being evaluated for COVID-19. If possible, put on a facemask before emergency medical services arrive. The patient agrees to contact the [employer] exposure line [phone], local health department  and PCP office for questions related to their healthcare.  Author provided contact information for future reference. Patient/family/caregiver given information for [employer] and agrees to enroll yes  Patient's preferred e-mail: [email or web address]   Patient's preferred phone number: [phone]  Based on Loop alert triggers, patient will be contacted by nurse care manager for worsening symptoms.

## 2020-06-10 ENCOUNTER — OFFICE VISIT (OUTPATIENT)
Dept: FAMILY MEDICINE CLINIC | Age: 60
End: 2020-06-10
Payer: COMMERCIAL

## 2020-06-10 VITALS
HEART RATE: 67 BPM | WEIGHT: 268.8 LBS | OXYGEN SATURATION: 96 % | SYSTOLIC BLOOD PRESSURE: 120 MMHG | HEIGHT: 71 IN | DIASTOLIC BLOOD PRESSURE: 80 MMHG | BODY MASS INDEX: 37.63 KG/M2 | TEMPERATURE: 97.2 F

## 2020-06-10 PROCEDURE — 99214 OFFICE O/P EST MOD 30 MIN: CPT | Performed by: FAMILY MEDICINE

## 2020-06-10 RX ORDER — ASPIRIN 81 MG/1
81 TABLET, CHEWABLE ORAL DAILY
Qty: 90 TABLET | Refills: 1 | Status: SHIPPED | OUTPATIENT
Start: 2020-06-10 | End: 2020-12-15 | Stop reason: SDUPTHER

## 2020-06-10 RX ORDER — FUROSEMIDE 20 MG/1
20 TABLET ORAL 2 TIMES DAILY
Qty: 180 TABLET | Refills: 1 | Status: SHIPPED | OUTPATIENT
Start: 2020-06-10 | End: 2021-04-22 | Stop reason: SDUPTHER

## 2020-06-10 RX ORDER — CLOPIDOGREL BISULFATE 75 MG/1
75 TABLET ORAL DAILY
Qty: 90 TABLET | Refills: 1 | Status: SHIPPED | OUTPATIENT
Start: 2020-06-10 | End: 2021-01-21 | Stop reason: SDUPTHER

## 2020-06-10 RX ORDER — CARVEDILOL 6.25 MG/1
6.25 TABLET ORAL 2 TIMES DAILY WITH MEALS
Qty: 180 TABLET | Refills: 1 | Status: SHIPPED | OUTPATIENT
Start: 2020-06-10 | End: 2020-12-15 | Stop reason: SDUPTHER

## 2020-06-10 RX ORDER — OMEPRAZOLE 20 MG/1
20 CAPSULE, DELAYED RELEASE ORAL
Qty: 60 CAPSULE | Refills: 5 | Status: SHIPPED | OUTPATIENT
Start: 2020-06-10 | End: 2020-06-12

## 2020-06-10 RX ORDER — ATORVASTATIN CALCIUM 20 MG/1
20 TABLET, FILM COATED ORAL NIGHTLY
Qty: 90 TABLET | Refills: 1 | Status: SHIPPED | OUTPATIENT
Start: 2020-06-10 | End: 2021-01-21 | Stop reason: SDUPTHER

## 2020-06-10 RX ORDER — TADALAFIL 5 MG/1
5 TABLET ORAL DAILY
Qty: 90 TABLET | Refills: 1 | Status: SHIPPED | OUTPATIENT
Start: 2020-06-10 | End: 2021-01-04 | Stop reason: SDUPTHER

## 2020-06-10 RX ORDER — LORATADINE 10 MG/1
TABLET ORAL
Qty: 90 TABLET | Refills: 1 | Status: SHIPPED | OUTPATIENT
Start: 2020-06-10 | End: 2021-04-29 | Stop reason: SDUPTHER

## 2020-06-10 RX ORDER — TAMSULOSIN HYDROCHLORIDE 0.4 MG/1
0.4 CAPSULE ORAL DAILY
Qty: 90 CAPSULE | Refills: 1 | Status: SHIPPED | OUTPATIENT
Start: 2020-06-10 | End: 2021-01-21 | Stop reason: SDUPTHER

## 2020-06-10 RX ORDER — ALBUTEROL SULFATE 90 UG/1
2 AEROSOL, METERED RESPIRATORY (INHALATION) 4 TIMES DAILY PRN
Qty: 1 INHALER | Refills: 5 | Status: SHIPPED | OUTPATIENT
Start: 2020-06-10 | End: 2020-12-15 | Stop reason: SDUPTHER

## 2020-06-10 RX ORDER — DOXYCYCLINE HYCLATE 100 MG/1
100 CAPSULE ORAL DAILY
Qty: 90 CAPSULE | Refills: 1 | Status: SHIPPED | OUTPATIENT
Start: 2020-06-10 | End: 2021-03-19 | Stop reason: SDUPTHER

## 2020-06-10 RX ORDER — IPRATROPIUM BROMIDE 42 UG/1
2 SPRAY, METERED NASAL 3 TIMES DAILY
Qty: 1 BOTTLE | Refills: 5 | Status: SHIPPED | OUTPATIENT
Start: 2020-06-10 | End: 2021-01-21 | Stop reason: SDUPTHER

## 2020-06-10 RX ORDER — MONTELUKAST SODIUM 10 MG/1
10 TABLET ORAL NIGHTLY
Qty: 90 TABLET | Refills: 1 | Status: SHIPPED | OUTPATIENT
Start: 2020-06-10 | End: 2021-01-21 | Stop reason: SDUPTHER

## 2020-06-10 NOTE — PROGRESS NOTES
abnormality     on nose - takes Doxycycline    Thoracic aortic aneurysm (HCC)     Wears dentures     top & bottom       FAMILY HISTORY  Family History   Problem Relation Age of Onset    Stroke Mother     Hypertension Mother     Coronary Art Dis Other 64        uncle       SOCIAL HISTORY  Social History     Socioeconomic History    Marital status:      Spouse name: None    Number of children: None    Years of education: None    Highest education level: None   Occupational History    None   Social Needs    Financial resource strain: None    Food insecurity     Worry: None     Inability: None    Transportation needs     Medical: None     Non-medical: None   Tobacco Use    Smoking status: Former Smoker     Packs/day: 2.50     Years: 41.00     Pack years: 102.50     Types: Cigarettes     Start date: 1976     Last attempt to quit: 1/1/2017     Years since quitting: 3.4    Smokeless tobacco: Never Used   Substance and Sexual Activity    Alcohol use:  Yes     Alcohol/week: 3.0 standard drinks     Types: 3 Shots of liquor per week     Comment: occaisional    Drug use: No    Sexual activity: None   Lifestyle    Physical activity     Days per week: None     Minutes per session: None    Stress: None   Relationships    Social connections     Talks on phone: None     Gets together: None     Attends Orthodoxy service: None     Active member of club or organization: None     Attends meetings of clubs or organizations: None     Relationship status: None    Intimate partner violence     Fear of current or ex partner: None     Emotionally abused: None     Physically abused: None     Forced sexual activity: None   Other Topics Concern    None   Social History Narrative    None        SURGICAL HISTORY  Past Surgical History:   Procedure Laterality Date    AORTIC VALVE REPLACEMENT N/A 01/20/2020    Dr. Rena Reis N/A 1/20/2020    MINIMALLY INVASIVE AORTIC VALVE REPLACEMENT, INTRAOPERATIVE RAY, INDUCED HYPOTHERMIA performed by Jessica Nails MD at [address] [facility] [address]  2012    CHOLECYSTECTOMY  2012    COLONOSCOPY  2012    Normal exam per pt - Dr. Ho Floyd?     COLONOSCOPY  2017    CORONARY ANGIOPLASTY WITH STENT PLACEMENT      2010 - Dr. Camilla Wolf;   2017 - (RCA) by Dr. Iva Live Right 1990's    Inguinal    LUMBAR DISCECTOMY  03/2004    L4- L5    OTHER SURGICAL HISTORY      genital warts excised    THORACIC AORTIC ANEURYSM REPAIR N/A 1/20/2020    THORACIC ASCENDING AORTIC ANEURYSM REPAIR performed by Jessica Nails MD at [address] [facility]  Current Outpatient Medications   Medication Sig Dispense Refill    albuterol sulfate HFA (VENTOLIN HFA) 108 (90 Base) MCG/ACT inhaler Inhale 2 puffs into the lungs 4 times daily as needed for Wheezing 1 Inhaler 5    tadalafil (CIALIS) 5 MG tablet Take 1 tablet by mouth daily 90 tablet 1    doxycycline hyclate (VIBRAMYCIN) 100 MG capsule Take 1 capsule by mouth daily 90 capsule 1    furosemide (LASIX) 20 MG tablet Take 1 tablet by mouth 2 times daily 180 tablet 1    carvedilol (COREG) 6.25 MG tablet Take 1 tablet by mouth 2 times daily (with meals) 180 tablet 1    montelukast (SINGULAIR) 10 MG tablet Take 1 tablet by mouth nightly 90 tablet 1    tamsulosin (FLOMAX) 0.4 MG capsule Take 1 capsule by mouth daily 90 capsule 1    loratadine (CLARITIN) 10 MG tablet TAKE 1 TABLET BY MOUTH EVERY DAY 90 tablet 1    atorvastatin (LIPITOR) 20 MG tablet Take 1 tablet by mouth nightly 90 tablet 1    clopidogrel (PLAVIX) 75 MG tablet Take 1 tablet by mouth daily 90 tablet 1    ipratropium (ATROVENT) 0.06 % nasal spray 2 sprays by Nasal route 3 times daily 1 Bottle 5    aspirin 81 MG chewable tablet Take 1 tablet by mouth daily 90 tablet 1    omeprazole (PRILOSEC) 20 MG delayed release capsule Take 1 capsule by mouth 2 times daily (before meals) 60 capsule 5    raNITIdine (ZANTAC) 150 MG Essential hypertension  Issue controlled. Continue meds. Refilled meds. - furosemide (LASIX) 20 MG tablet; Take 1 tablet by mouth 2 times daily  Dispense: 180 tablet; Refill: 1  - carvedilol (COREG) 6.25 MG tablet; Take 1 tablet by mouth 2 times daily (with meals)  Dispense: 180 tablet; Refill: 1    4. Gastroesophageal reflux disease, esophagitis presence not specified  Discussed antireflux precautions. Patient does drink some water through the night intermittently. He is asked to stop that. Patient does 1 coffee and 1 soda a day. Patient asked to cut that at least in half. Patient asked to remain off the Zantac and start Prilosec 20 mg twice daily. If cough resolves we can bring that down to at least once a day.       Follow-up 4 months  Electronically signed by Rebeka Huffman MD on 6/10/2020

## 2020-06-12 ENCOUNTER — TELEPHONE (OUTPATIENT)
Dept: FAMILY MEDICINE CLINIC | Age: 60
End: 2020-06-12

## 2020-06-12 RX ORDER — PANTOPRAZOLE SODIUM 40 MG/1
40 TABLET, DELAYED RELEASE ORAL
Qty: 30 TABLET | Refills: 5 | Status: SHIPPED | OUTPATIENT
Start: 2020-06-12 | End: 2020-12-07

## 2020-06-12 NOTE — TELEPHONE ENCOUNTER
Requested Prescriptions     Signed Prescriptions Disp Refills    pantoprazole (PROTONIX) 40 MG tablet 30 tablet 5     Sig: Take 1 tablet by mouth every morning (before breakfast)     Authorizing Provider: Soraya Perez     Ordering User: Paulino Elaine

## 2020-12-15 RX ORDER — ASPIRIN 81 MG/1
81 TABLET, CHEWABLE ORAL DAILY
Qty: 30 TABLET | Refills: 0 | Status: SHIPPED | OUTPATIENT
Start: 2020-12-15 | End: 2022-04-28 | Stop reason: SDUPTHER

## 2020-12-15 RX ORDER — CARVEDILOL 6.25 MG/1
6.25 TABLET ORAL 2 TIMES DAILY WITH MEALS
Qty: 30 TABLET | Refills: 0 | Status: SHIPPED | OUTPATIENT
Start: 2020-12-15 | End: 2021-01-04 | Stop reason: SDUPTHER

## 2020-12-15 RX ORDER — ALBUTEROL SULFATE 90 UG/1
2 AEROSOL, METERED RESPIRATORY (INHALATION) 4 TIMES DAILY PRN
Qty: 1 INHALER | Refills: 0 | Status: SHIPPED | OUTPATIENT
Start: 2020-12-15 | End: 2021-01-21 | Stop reason: SDUPTHER

## 2021-01-04 DIAGNOSIS — I10 ESSENTIAL HYPERTENSION: ICD-10-CM

## 2021-01-04 RX ORDER — CARVEDILOL 6.25 MG/1
6.25 TABLET ORAL 2 TIMES DAILY WITH MEALS
Qty: 60 TABLET | Refills: 0 | Status: SHIPPED | OUTPATIENT
Start: 2021-01-04 | End: 2021-01-21 | Stop reason: SDUPTHER

## 2021-01-04 RX ORDER — TADALAFIL 5 MG/1
5 TABLET ORAL DAILY
Qty: 30 TABLET | Refills: 0 | Status: SHIPPED | OUTPATIENT
Start: 2021-01-04 | End: 2021-01-21 | Stop reason: SDUPTHER

## 2021-01-04 NOTE — TELEPHONE ENCOUNTER
Requested Prescriptions     Signed Prescriptions Disp Refills    carvedilol (COREG) 6.25 MG tablet 60 tablet 0     Sig: Take 1 tablet by mouth 2 times daily (with meals)     Authorizing Provider: Erica Moore     Ordering User: LILA Carreon    tadalafil (CIALIS) 5 MG tablet 30 tablet 0     Sig: Take 1 tablet by mouth daily     Authorizing Provider: Erica Moore     Ordering User: Amelia Charles

## 2021-01-19 ENCOUNTER — TELEPHONE (OUTPATIENT)
Dept: FAMILY MEDICINE CLINIC | Age: 61
End: 2021-01-19

## 2021-01-19 NOTE — TELEPHONE ENCOUNTER
Wants to know if he should go ahead and go back to work after having covid or wait until he sees you on Thursday? ?

## 2021-01-21 ENCOUNTER — VIRTUAL VISIT (OUTPATIENT)
Dept: FAMILY MEDICINE CLINIC | Age: 61
End: 2021-01-21

## 2021-01-21 DIAGNOSIS — J43.9 PULMONARY EMPHYSEMA, UNSPECIFIED EMPHYSEMA TYPE (HCC): ICD-10-CM

## 2021-01-21 DIAGNOSIS — R05.9 COUGH: ICD-10-CM

## 2021-01-21 DIAGNOSIS — I10 ESSENTIAL HYPERTENSION: Primary | ICD-10-CM

## 2021-01-21 DIAGNOSIS — I25.10 CORONARY ARTERIOSCLEROSIS: ICD-10-CM

## 2021-01-21 RX ORDER — CLOPIDOGREL BISULFATE 75 MG/1
75 TABLET ORAL DAILY
Qty: 90 TABLET | Refills: 1 | Status: SHIPPED | OUTPATIENT
Start: 2021-01-21 | End: 2021-02-16 | Stop reason: SDUPTHER

## 2021-01-21 RX ORDER — MONTELUKAST SODIUM 10 MG/1
10 TABLET ORAL NIGHTLY
Qty: 90 TABLET | Refills: 1 | Status: SHIPPED | OUTPATIENT
Start: 2021-01-21 | End: 2021-02-16 | Stop reason: SDUPTHER

## 2021-01-21 RX ORDER — TADALAFIL 5 MG/1
5 TABLET ORAL DAILY
Qty: 30 TABLET | Refills: 0 | Status: SHIPPED | OUTPATIENT
Start: 2021-01-21 | End: 2021-02-16 | Stop reason: SDUPTHER

## 2021-01-21 RX ORDER — PANTOPRAZOLE SODIUM 40 MG/1
40 TABLET, DELAYED RELEASE ORAL DAILY
Qty: 90 TABLET | Refills: 1 | Status: SHIPPED | OUTPATIENT
Start: 2021-01-21 | End: 2021-02-16 | Stop reason: SDUPTHER

## 2021-01-21 RX ORDER — ALBUTEROL SULFATE 90 UG/1
2 AEROSOL, METERED RESPIRATORY (INHALATION) 4 TIMES DAILY PRN
Qty: 1 INHALER | Refills: 2 | Status: SHIPPED | OUTPATIENT
Start: 2021-01-21 | End: 2021-02-16 | Stop reason: SDUPTHER

## 2021-01-21 RX ORDER — TAMSULOSIN HYDROCHLORIDE 0.4 MG/1
0.4 CAPSULE ORAL DAILY
Qty: 90 CAPSULE | Refills: 1 | Status: SHIPPED | OUTPATIENT
Start: 2021-01-21 | End: 2021-02-16 | Stop reason: SDUPTHER

## 2021-01-21 RX ORDER — ATORVASTATIN CALCIUM 20 MG/1
20 TABLET, FILM COATED ORAL NIGHTLY
Qty: 90 TABLET | Refills: 1 | Status: SHIPPED | OUTPATIENT
Start: 2021-01-21 | End: 2021-02-16 | Stop reason: SDUPTHER

## 2021-01-21 RX ORDER — CARVEDILOL 6.25 MG/1
6.25 TABLET ORAL 2 TIMES DAILY WITH MEALS
Qty: 180 TABLET | Refills: 1 | Status: SHIPPED | OUTPATIENT
Start: 2021-01-21 | End: 2021-02-16 | Stop reason: SDUPTHER

## 2021-01-21 RX ORDER — IPRATROPIUM BROMIDE 42 UG/1
2 SPRAY, METERED NASAL 3 TIMES DAILY
Qty: 1 BOTTLE | Refills: 2 | Status: SHIPPED | OUTPATIENT
Start: 2021-01-21 | End: 2021-02-16 | Stop reason: SDUPTHER

## 2021-01-21 ASSESSMENT — PATIENT HEALTH QUESTIONNAIRE - PHQ9
1. LITTLE INTEREST OR PLEASURE IN DOING THINGS: 0
SUM OF ALL RESPONSES TO PHQ QUESTIONS 1-9: 0
SUM OF ALL RESPONSES TO PHQ9 QUESTIONS 1 & 2: 0
SUM OF ALL RESPONSES TO PHQ QUESTIONS 1-9: 0

## 2021-01-22 ENCOUNTER — TELEPHONE (OUTPATIENT)
Dept: FAMILY MEDICINE CLINIC | Age: 61
End: 2021-01-22

## 2021-01-22 DIAGNOSIS — U07.1 COVID-19: Primary | ICD-10-CM

## 2021-01-22 RX ORDER — METHYLPREDNISOLONE 4 MG/1
TABLET ORAL
Qty: 1 KIT | Refills: 0 | Status: SHIPPED | OUTPATIENT
Start: 2021-01-22 | End: 2021-01-28

## 2021-01-22 RX ORDER — AZITHROMYCIN 250 MG/1
250 TABLET, FILM COATED ORAL SEE ADMIN INSTRUCTIONS
Qty: 6 TABLET | Refills: 0 | Status: SHIPPED | OUTPATIENT
Start: 2021-01-22 | End: 2021-01-27

## 2021-01-22 NOTE — TELEPHONE ENCOUNTER
Spoke with pt. Pt states starting today he is sx free. Yesterday he had Mild shortness of breath\" & fatigue. His son was just worried. Pt did not go to er. Pt states last night while in bed felt very hot, sweating . Pt unsure what his actual temp is bur he has already told his employer he will be back to work Monday.

## 2021-01-22 NOTE — TELEPHONE ENCOUNTER
Please let us know what happened yesterday. We were told that you are significantly short of breath and we suggested that you need to go to the emergency room. If your oxygenation actually has dropped or if you have ever needed hospital assistance the 10-day return to work turns into a 20-day return to work. Are you sure that your breathing is normal today?

## 2021-01-22 NOTE — TELEPHONE ENCOUNTER
Patient is requesting a note to return to work. Patient tested positive for Covid 19. 1-11-21 and his quarantine is up on 1-20.  Call Tiff richardson wife when note is ready [phone]

## 2021-01-22 NOTE — PROGRESS NOTES
Patient was asked to seek care in the emergency room due to having Covid positive people in his house and him being symptomatic and short of breath. We felt that is more appropriate than converting him to a video visit.

## 2021-01-28 ENCOUNTER — TELEPHONE (OUTPATIENT)
Dept: FAMILY MEDICINE CLINIC | Age: 61
End: 2021-01-28

## 2021-01-28 NOTE — TELEPHONE ENCOUNTER
Patient called and requested a return to work note. Return to work date 2-1-21 Has been off work for 3 weeks do to  having Covid 23.  Call patient when note is ready for

## 2021-02-16 DIAGNOSIS — I10 ESSENTIAL HYPERTENSION: ICD-10-CM

## 2021-02-16 DIAGNOSIS — R05.9 COUGH: ICD-10-CM

## 2021-02-16 DIAGNOSIS — I25.10 CORONARY ARTERIOSCLEROSIS: ICD-10-CM

## 2021-02-16 RX ORDER — TAMSULOSIN HYDROCHLORIDE 0.4 MG/1
0.4 CAPSULE ORAL DAILY
Qty: 90 CAPSULE | Refills: 1 | Status: SHIPPED | OUTPATIENT
Start: 2021-02-16 | End: 2021-04-29 | Stop reason: SDUPTHER

## 2021-02-16 RX ORDER — ATORVASTATIN CALCIUM 20 MG/1
20 TABLET, FILM COATED ORAL NIGHTLY
Qty: 90 TABLET | Refills: 1 | Status: SHIPPED | OUTPATIENT
Start: 2021-02-16 | End: 2021-04-29 | Stop reason: SDUPTHER

## 2021-02-16 RX ORDER — MONTELUKAST SODIUM 10 MG/1
10 TABLET ORAL NIGHTLY
Qty: 90 TABLET | Refills: 1 | Status: SHIPPED | OUTPATIENT
Start: 2021-02-16 | End: 2021-04-29 | Stop reason: SDUPTHER

## 2021-02-16 RX ORDER — PANTOPRAZOLE SODIUM 40 MG/1
40 TABLET, DELAYED RELEASE ORAL DAILY
Qty: 90 TABLET | Refills: 1 | Status: SHIPPED | OUTPATIENT
Start: 2021-02-16 | End: 2021-04-29 | Stop reason: SDUPTHER

## 2021-02-16 RX ORDER — TADALAFIL 5 MG/1
5 TABLET ORAL DAILY
Qty: 90 TABLET | Refills: 0 | Status: SHIPPED | OUTPATIENT
Start: 2021-02-16 | End: 2021-04-29 | Stop reason: SDUPTHER

## 2021-02-16 RX ORDER — CARVEDILOL 6.25 MG/1
6.25 TABLET ORAL 2 TIMES DAILY WITH MEALS
Qty: 180 TABLET | Refills: 1 | Status: SHIPPED | OUTPATIENT
Start: 2021-02-16 | End: 2021-04-29 | Stop reason: SDUPTHER

## 2021-02-16 RX ORDER — ALBUTEROL SULFATE 90 UG/1
2 AEROSOL, METERED RESPIRATORY (INHALATION) 4 TIMES DAILY PRN
Qty: 1 INHALER | Refills: 2 | Status: SHIPPED | OUTPATIENT
Start: 2021-02-16 | End: 2021-10-05 | Stop reason: SDUPTHER

## 2021-02-16 RX ORDER — IPRATROPIUM BROMIDE 42 UG/1
2 SPRAY, METERED NASAL 3 TIMES DAILY
Qty: 1 BOTTLE | Refills: 2 | Status: SHIPPED | OUTPATIENT
Start: 2021-02-16 | End: 2021-07-23

## 2021-02-16 RX ORDER — CLOPIDOGREL BISULFATE 75 MG/1
75 TABLET ORAL DAILY
Qty: 90 TABLET | Refills: 1 | Status: SHIPPED | OUTPATIENT
Start: 2021-02-16 | End: 2021-04-29 | Stop reason: SDUPTHER

## 2021-02-16 NOTE — TELEPHONE ENCOUNTER
Patients wife Tiff called and stated that the script for Cialis needs to be #90 send to [facility]  All the scripts sent 1-21-21 needs resent to [address] [place] [address]. Tiff stated she called the pharmacy and they were not received.

## 2021-02-16 NOTE — TELEPHONE ENCOUNTER
Requested Prescriptions     Signed Prescriptions Disp Refills    tadalafil (CIALIS) 5 MG tablet 90 tablet 0     Sig: Take 1 tablet by mouth daily     Authorizing Provider: Kayla Thompson User: LILA Campbell    albuterol sulfate HFA (VENTOLIN HFA) 108 (90 Base) MCG/ACT inhaler 1 Inhaler 2     Sig: Inhale 2 puffs into the lungs 4 times daily as needed for Wheezing     Authorizing Provider: Kayla Thompson User: LILA Campbell    atorvastatin (LIPITOR) 20 MG tablet 90 tablet 1     Sig: Take 1 tablet by mouth nightly     Authorizing Provider: Kayla Thompson User: LILA Campbell    carvedilol (COREG) 6.25 MG tablet 180 tablet 1     Sig: Take 1 tablet by mouth 2 times daily (with meals)     Authorizing Provider: Kayla Thompson User: Polly Funes    clopidogrel (PLAVIX) 75 MG tablet 90 tablet 1     Sig: Take 1 tablet by mouth daily     Authorizing Provider: Kayla Thompson User: LILA Campbell    ipratropium (ATROVENT) 0.06 % nasal spray 1 Bottle 2     Si sprays by Nasal route 3 times daily     Authorizing Provider: Kayla Thompson User: DOMINGO CUBAN    montelukast (SINGULAIR) 10 MG tablet 90 tablet 1     Sig: Take 1 tablet by mouth nightly     Authorizing Provider: Kayla Thompson User: LILA Campbell    pantoprazole (PROTONIX) 40 MG tablet 90 tablet 1     Sig: Take 1 tablet by mouth daily     Authorizing Provider: Kayla Thompson User: [place]    tamsulosin (FLOMAX) 0.4 MG capsule 90 capsule 1     Sig: Take 1 capsule by mouth daily     Authorizing Provider: Kayla Thompson User: [place]

## 2021-03-10 DIAGNOSIS — R05.9 COUGH: ICD-10-CM

## 2021-03-10 RX ORDER — LORATADINE 10 MG/1
TABLET ORAL
Qty: 90 TABLET | Refills: 1 | OUTPATIENT
Start: 2021-03-10

## 2021-03-19 ENCOUNTER — TELEPHONE (OUTPATIENT)
Dept: FAMILY MEDICINE CLINIC | Age: 61
End: 2021-03-19

## 2021-03-19 RX ORDER — DOXYCYCLINE HYCLATE 100 MG/1
100 CAPSULE ORAL DAILY
Qty: 90 CAPSULE | Refills: 1 | Status: SHIPPED | OUTPATIENT
Start: 2021-03-19 | End: 2021-04-29 | Stop reason: SDUPTHER

## 2021-04-12 DIAGNOSIS — R05.9 COUGH: ICD-10-CM

## 2021-04-12 DIAGNOSIS — I10 ESSENTIAL HYPERTENSION: ICD-10-CM

## 2021-04-13 RX ORDER — FUROSEMIDE 20 MG/1
20 TABLET ORAL 2 TIMES DAILY
Qty: 180 TABLET | Refills: 0 | OUTPATIENT
Start: 2021-04-13 | End: 2021-07-12

## 2021-04-13 RX ORDER — LORATADINE 10 MG/1
TABLET ORAL
Qty: 90 TABLET | Refills: 1 | OUTPATIENT
Start: 2021-04-13 | End: 2021-07-13

## 2021-04-13 NOTE — TELEPHONE ENCOUNTER
Patient was not seen that day, progress note reads: \"Patient was asked to seek care in the emergency room due to having Covid positive people in his house and him being symptomatic and short of breath. We felt that is more appropriate than converting him to a video visit. \"

## 2021-04-22 DIAGNOSIS — I10 ESSENTIAL HYPERTENSION: ICD-10-CM

## 2021-04-22 RX ORDER — FUROSEMIDE 20 MG/1
20 TABLET ORAL 2 TIMES DAILY
Qty: 180 TABLET | Refills: 0 | Status: SHIPPED | OUTPATIENT
Start: 2021-04-22 | End: 2021-07-23

## 2021-04-22 RX ORDER — RANITIDINE 150 MG/1
150 TABLET ORAL 2 TIMES DAILY
Qty: 180 TABLET | Refills: 0 | Status: SHIPPED | OUTPATIENT
Start: 2021-04-22 | End: 2021-04-29

## 2021-04-29 ENCOUNTER — OFFICE VISIT (OUTPATIENT)
Dept: FAMILY MEDICINE CLINIC | Age: 61
End: 2021-04-29
Payer: COMMERCIAL

## 2021-04-29 VITALS
TEMPERATURE: 97.2 F | WEIGHT: 281 LBS | BODY MASS INDEX: 39.34 KG/M2 | DIASTOLIC BLOOD PRESSURE: 86 MMHG | SYSTOLIC BLOOD PRESSURE: 132 MMHG | OXYGEN SATURATION: 97 % | HEART RATE: 50 BPM | HEIGHT: 71 IN

## 2021-04-29 DIAGNOSIS — J43.9 PULMONARY EMPHYSEMA, UNSPECIFIED EMPHYSEMA TYPE (HCC): ICD-10-CM

## 2021-04-29 DIAGNOSIS — E78.00 PURE HYPERCHOLESTEROLEMIA: ICD-10-CM

## 2021-04-29 DIAGNOSIS — I25.10 CORONARY ARTERIOSCLEROSIS: Primary | ICD-10-CM

## 2021-04-29 DIAGNOSIS — I10 ESSENTIAL HYPERTENSION: ICD-10-CM

## 2021-04-29 DIAGNOSIS — R05.9 COUGH: ICD-10-CM

## 2021-04-29 PROBLEM — I71.21 ASCENDING AORTIC ANEURYSM: Status: RESOLVED | Noted: 2020-01-20 | Resolved: 2021-04-29

## 2021-04-29 PROCEDURE — 99214 OFFICE O/P EST MOD 30 MIN: CPT | Performed by: FAMILY MEDICINE

## 2021-04-29 RX ORDER — CARVEDILOL 6.25 MG/1
6.25 TABLET ORAL 2 TIMES DAILY WITH MEALS
Qty: 180 TABLET | Refills: 1 | Status: SHIPPED | OUTPATIENT
Start: 2021-04-29 | End: 2021-10-06 | Stop reason: SDUPTHER

## 2021-04-29 RX ORDER — LORATADINE 10 MG/1
TABLET ORAL
Qty: 90 TABLET | Refills: 1 | Status: SHIPPED | OUTPATIENT
Start: 2021-04-29 | End: 2021-11-29 | Stop reason: SDUPTHER

## 2021-04-29 RX ORDER — ATORVASTATIN CALCIUM 20 MG/1
20 TABLET, FILM COATED ORAL NIGHTLY
Qty: 90 TABLET | Refills: 1 | Status: SHIPPED | OUTPATIENT
Start: 2021-04-29 | End: 2021-10-06 | Stop reason: SDUPTHER

## 2021-04-29 RX ORDER — MONTELUKAST SODIUM 10 MG/1
10 TABLET ORAL NIGHTLY
Qty: 90 TABLET | Refills: 1 | Status: SHIPPED | OUTPATIENT
Start: 2021-04-29 | End: 2021-10-06 | Stop reason: SDUPTHER

## 2021-04-29 RX ORDER — PANTOPRAZOLE SODIUM 40 MG/1
40 TABLET, DELAYED RELEASE ORAL DAILY
Qty: 90 TABLET | Refills: 1 | Status: SHIPPED | OUTPATIENT
Start: 2021-04-29 | End: 2021-10-06 | Stop reason: SDUPTHER

## 2021-04-29 RX ORDER — CLOPIDOGREL BISULFATE 75 MG/1
75 TABLET ORAL DAILY
Qty: 90 TABLET | Refills: 1 | Status: SHIPPED | OUTPATIENT
Start: 2021-04-29 | End: 2021-10-06 | Stop reason: SDUPTHER

## 2021-04-29 RX ORDER — DOXYCYCLINE HYCLATE 100 MG/1
100 CAPSULE ORAL DAILY
Qty: 90 CAPSULE | Refills: 1 | Status: SHIPPED | OUTPATIENT
Start: 2021-04-29 | End: 2021-10-28 | Stop reason: SDUPTHER

## 2021-04-29 RX ORDER — TAMSULOSIN HYDROCHLORIDE 0.4 MG/1
0.4 CAPSULE ORAL DAILY
Qty: 90 CAPSULE | Refills: 1 | Status: SHIPPED | OUTPATIENT
Start: 2021-04-29 | End: 2021-10-06 | Stop reason: SDUPTHER

## 2021-04-29 RX ORDER — TADALAFIL 5 MG/1
5 TABLET ORAL DAILY
Qty: 90 TABLET | Refills: 0 | Status: SHIPPED | OUTPATIENT
Start: 2021-04-29 | End: 2021-10-06 | Stop reason: SDUPTHER

## 2021-04-29 SDOH — ECONOMIC STABILITY: FOOD INSECURITY: WITHIN THE PAST 12 MONTHS, THE FOOD YOU BOUGHT JUST DIDN'T LAST AND YOU DIDN'T HAVE MONEY TO GET MORE.: NEVER TRUE

## 2021-04-29 SDOH — ECONOMIC STABILITY: TRANSPORTATION INSECURITY
IN THE PAST 12 MONTHS, HAS THE LACK OF TRANSPORTATION KEPT YOU FROM MEDICAL APPOINTMENTS OR FROM GETTING MEDICATIONS?: NO

## 2021-04-29 SDOH — ECONOMIC STABILITY: TRANSPORTATION INSECURITY
IN THE PAST 12 MONTHS, HAS LACK OF TRANSPORTATION KEPT YOU FROM MEETINGS, WORK, OR FROM GETTING THINGS NEEDED FOR DAILY LIVING?: NO

## 2021-04-29 NOTE — PROGRESS NOTES
4/29/2021    Ella Souza    Chief Complaint   Patient presents with    6 Month Follow-Up    Other     no c/o's       HPI    Hilbert Saint is a 64 y.o. male who presents today with follow-up. Patient doing well. He denies chest pain. He also denies shortness of breath. He uses his rescue inhaler just when needed. Patient is having allergy symptoms and wants to take his loratadine daily along with his Singulair. Patient has a sensation in the back of his throat that makes him cough. He is already an appointment to discuss with ENT tomorrow. Patient is not on an ACE inhibitor and is using loratadine and Singulair.       REVIEW OF SYSTEMS    Constitutional:  Denies fever, chills, weight loss or weakness  Eyes:  no photophobia or discharge  ENT:  no sore throat or ear pain  Cardiovascular:  Denies chest pain, palpitations or swelling  Respiratory:  Denies cough or shortness of breath  GI:  no abdominal pain, nausea, vomiting, or diarrhea  Musculoskeletal:  no back pain  Skin:  No rashes  Neurologic:  no headache, focal weakness, or sensory changes  Endocrine:  no polyuria or polydipsia      PAST MEDICAL HISTORY  Past Medical History:   Diagnosis Date    Aortic valve disease     Dr. Carlito Jarquin Ascending aortic aneurysm ([facility] 1/20/2020    Surgically repaired 1/2020    BPH (benign prostatic hyperplasia) 7/27/2019    COPD (chronic obstructive pulmonary disease) ([facility]     Hx: smoking - no pulmonologist as of 1/14/20    Coronary arteriosclerosis 7/27/2019    Dr. Mack Bhandari    GERD (gastroesophageal reflux disease) 07/27/2019    Takes Zantac    Impotence 7/27/2019    Skin abnormality     on nose - takes Doxycycline    Wears dentures     top & bottom       FAMILY HISTORY  Family History   Problem Relation Age of Onset    Stroke Mother     Hypertension Mother     Coronary Art Dis Other 64        uncle       SOCIAL HISTORY  Social History     Socioeconomic History    Marital status:      Spouse name: None Wt 281 lb (127.5 kg)   SpO2 97%   BMI 39.19 kg/m²     ASSESSMENT & PLAN    1. Coronary arteriosclerosis  Issue controlled. Continue meds. Refilled meds. - clopidogrel (PLAVIX) 75 MG tablet; Take 1 tablet by mouth daily  Dispense: 90 tablet; Refill: 1    2. Essential hypertension  Blood pressure not at goal today. Patient to do daily blood pressures x4 and call us back Monday with the results either way. - carvedilol (COREG) 6.25 MG tablet; Take 1 tablet by mouth 2 times daily (with meals)  Dispense: 180 tablet; Refill: 1  - CBC Auto Differential; Future    3. Pulmonary emphysema, unspecified emphysema type ([address]  Issue controlled. Continue meds. Refilled meds. 4. Cough  Treat allergies. Discussed with ENT. - loratadine (CLARITIN) 10 MG tablet; TAKE 1 TABLET BY MOUTH EVERY DAY  Dispense: 90 tablet; Refill: 1    5. Pure hypercholesterolemia  Issue is stable check labs today. Adjust medication off of lab results. - atorvastatin (LIPITOR) 20 MG tablet; Take 1 tablet by mouth nightly  Dispense: 90 tablet; Refill: 1  - Comprehensive Metabolic Panel; Future  - Lipid Panel;  Future    Follow-up 6 months       Electronically signed by Li Hudson MD on 4/29/2021

## 2021-05-07 DIAGNOSIS — I10 ESSENTIAL HYPERTENSION: ICD-10-CM

## 2021-05-07 DIAGNOSIS — E78.00 PURE HYPERCHOLESTEROLEMIA: ICD-10-CM

## 2021-05-07 LAB
A/G RATIO: 2.2 (ref 1.1–2.2)
ALBUMIN SERPL-MCNC: 4.1 G/DL (ref 3.4–5)
ALP BLD-CCNC: 73 U/L (ref 40–129)
ALT SERPL-CCNC: 19 U/L (ref 10–40)
ANION GAP SERPL CALCULATED.3IONS-SCNC: 12 MMOL/L (ref 3–16)
AST SERPL-CCNC: 21 U/L (ref 15–37)
BASOPHILS ABSOLUTE: 0 K/UL (ref 0–0.2)
BASOPHILS RELATIVE PERCENT: 0.6 %
BILIRUB SERPL-MCNC: 0.5 MG/DL (ref 0–1)
BUN BLDV-MCNC: 22 MG/DL (ref 7–20)
CALCIUM SERPL-MCNC: 8.5 MG/DL (ref 8.3–10.6)
CHLORIDE BLD-SCNC: 106 MMOL/L (ref 99–110)
CHOLESTEROL, TOTAL: 92 MG/DL (ref 0–199)
CO2: 22 MMOL/L (ref 21–32)
CREAT SERPL-MCNC: 1 MG/DL (ref 0.9–1.3)
EOSINOPHILS ABSOLUTE: 0.2 K/UL (ref 0–0.6)
EOSINOPHILS RELATIVE PERCENT: 4.8 %
GFR AFRICAN AMERICAN: >60
GFR NON-AFRICAN AMERICAN: >60
GLOBULIN: 1.9 G/DL
GLUCOSE BLD-MCNC: 106 MG/DL (ref 70–99)
HCT VFR BLD CALC: 41 % (ref 40.5–52.5)
HDLC SERPL-MCNC: 36 MG/DL (ref 40–60)
HEMOGLOBIN: 13.7 G/DL (ref 13.5–17.5)
LDL CHOLESTEROL CALCULATED: 42 MG/DL
LYMPHOCYTES ABSOLUTE: 1.8 K/UL (ref 1–5.1)
LYMPHOCYTES RELATIVE PERCENT: 36.3 %
MCH RBC QN AUTO: 29.4 PG (ref 26–34)
MCHC RBC AUTO-ENTMCNC: 33.5 G/DL (ref 31–36)
MCV RBC AUTO: 87.8 FL (ref 80–100)
MONOCYTES ABSOLUTE: 0.4 K/UL (ref 0–1.3)
MONOCYTES RELATIVE PERCENT: 8.7 %
NEUTROPHILS ABSOLUTE: 2.4 K/UL (ref 1.7–7.7)
NEUTROPHILS RELATIVE PERCENT: 49.6 %
PDW BLD-RTO: 13.7 % (ref 12.4–15.4)
PLATELET # BLD: 119 K/UL (ref 135–450)
PMV BLD AUTO: 9.4 FL (ref 5–10.5)
POTASSIUM SERPL-SCNC: 4.5 MMOL/L (ref 3.5–5.1)
RBC # BLD: 4.68 M/UL (ref 4.2–5.9)
SODIUM BLD-SCNC: 140 MMOL/L (ref 136–145)
TOTAL PROTEIN: 6 G/DL (ref 6.4–8.2)
TRIGL SERPL-MCNC: 69 MG/DL (ref 0–150)
VLDLC SERPL CALC-MCNC: 14 MG/DL
WBC # BLD: 4.9 K/UL (ref 4–11)

## 2021-05-14 ENCOUNTER — TELEPHONE (OUTPATIENT)
Dept: FAMILY MEDICINE CLINIC | Age: 61
End: 2021-05-14

## 2021-05-14 NOTE — TELEPHONE ENCOUNTER
Giancarlo Craft, thanks for the call. I want to start you on a blood pressure medicine that is related to lisinopril but it should not make you cough. Please start losartan 50 mg 1 daily #30 refill 5. Please call us after a 2  weeks on the additional blood pressure pill with your last 4 numbers.   Thanks

## 2021-05-14 NOTE — TELEPHONE ENCOUNTER
Here are some bp readings for you:    5/3/21- 153/104  64  5/4/21- 151/91    60  5/6/21- 115/79    63  5/7/21-  133/86   50  5/8/21-  152/86   50  5/9/21-  151/88    -

## 2021-07-22 DIAGNOSIS — R05.9 COUGH: ICD-10-CM

## 2021-07-22 DIAGNOSIS — I10 ESSENTIAL HYPERTENSION: ICD-10-CM

## 2021-07-23 RX ORDER — IPRATROPIUM BROMIDE 42 UG/1
SPRAY, METERED NASAL
Qty: 1 BOTTLE | Refills: 2 | Status: SHIPPED | OUTPATIENT
Start: 2021-07-23

## 2021-07-23 RX ORDER — FUROSEMIDE 20 MG/1
20 TABLET ORAL 2 TIMES DAILY
Qty: 180 TABLET | Refills: 0 | Status: SHIPPED | OUTPATIENT
Start: 2021-07-23 | End: 2021-10-06 | Stop reason: SDUPTHER

## 2021-09-21 ENCOUNTER — NURSE TRIAGE (OUTPATIENT)
Dept: OTHER | Facility: CLINIC | Age: 61
End: 2021-09-21

## 2021-09-21 ENCOUNTER — HOSPITAL ENCOUNTER (OUTPATIENT)
Age: 61
Setting detail: SPECIMEN
Discharge: HOME | End: 2021-09-21
Payer: COMMERCIAL

## 2021-09-21 ENCOUNTER — OFFICE VISIT (OUTPATIENT)
Dept: FAMILY MEDICINE CLINIC | Age: 61
End: 2021-09-21
Payer: COMMERCIAL

## 2021-09-21 ENCOUNTER — HOSPITAL ENCOUNTER (OUTPATIENT)
Dept: ULTRASOUND IMAGING | Age: 61
Discharge: HOME | End: 2021-09-21
Payer: COMMERCIAL

## 2021-09-21 VITALS — HEART RATE: 50 BPM | TEMPERATURE: 96.5 F | OXYGEN SATURATION: 96 %

## 2021-09-21 DIAGNOSIS — R53.83 FATIGUE, UNSPECIFIED TYPE: ICD-10-CM

## 2021-09-21 DIAGNOSIS — M79.89 PAIN AND SWELLING OF RIGHT LOWER EXTREMITY: ICD-10-CM

## 2021-09-21 DIAGNOSIS — M79.604 PAIN AND SWELLING OF RIGHT LOWER EXTREMITY: ICD-10-CM

## 2021-09-21 DIAGNOSIS — R05.9 COUGH: Primary | ICD-10-CM

## 2021-09-21 DIAGNOSIS — R11.0 NAUSEA: ICD-10-CM

## 2021-09-21 DIAGNOSIS — R21 RASH: ICD-10-CM

## 2021-09-21 PROCEDURE — 99213 OFFICE O/P EST LOW 20 MIN: CPT | Performed by: NURSE PRACTITIONER

## 2021-09-21 PROCEDURE — 93971 EXTREMITY STUDY: CPT

## 2021-09-21 PROCEDURE — U0005 INFEC AGEN DETEC AMPLI PROBE: HCPCS

## 2021-09-21 PROCEDURE — U0003 INFECTIOUS AGENT DETECTION BY NUCLEIC ACID (DNA OR RNA); SEVERE ACUTE RESPIRATORY SYNDROME CORONAVIRUS 2 (SARS-COV-2) (CORONAVIRUS DISEASE [COVID-19]), AMPLIFIED PROBE TECHNIQUE, MAKING USE OF HIGH THROUGHPUT TECHNOLOGIES AS DESCRIBED BY CMS-2020-01-R: HCPCS

## 2021-09-21 RX ORDER — ONDANSETRON 4 MG/1
4 TABLET, ORALLY DISINTEGRATING ORAL EVERY 8 HOURS PRN
Qty: 15 TABLET | Refills: 0 | Status: SHIPPED | OUTPATIENT
Start: 2021-09-21 | End: 2021-10-28

## 2021-09-21 RX ORDER — TRIAMCINOLONE ACETONIDE 1 MG/G
CREAM TOPICAL
Qty: 45 G | Refills: 0 | Status: SHIPPED | OUTPATIENT
Start: 2021-09-21 | End: 2021-10-28

## 2021-09-21 NOTE — TELEPHONE ENCOUNTER
Received call from Jaylene Faustin at pre-service [facility] with [employer] Complaint. Brief description of triage: Pt and wife call to report symptoms of nausea, vomiting, diarrhea and rash on right leg. States symptoms of N/V/D started after eating out on Friday and rash started on Sunday. Rates rash pain as a 9/10 when initially standing on leg. Reports rash initially started on right ankle but has now spread up to right knee. Describes as red blotches anywhere from quarter sized to half dollar. Denies fevers, open wounds, or looking infected. Triage indicates for patient to: See today in office. Care advice provided, patient verbalizes understanding; denies any other questions or concerns; instructed to call back for any new or worsening symptoms. Writer provided warm transfer to [facility] for appointment scheduling. Attention Provider: Thank you for allowing me to participate in the care of your patient. The patient was connected to triage in response to information provided to the ECC. Please do not respond through this encounter as the response is not directed to a shared pool. Reason for Disposition   Localized rash is very painful (no fever)    Answer Assessment - Initial Assessment Questions  1. APPEARANCE of RASH: \"Describe the rash. \"       Red blotches     2. LOCATION: \"Where is the rash located? \"       Started near right ankle but has now spread all the way up to knee    3. NUMBER: \"How many spots are there? \"       Multiple    4. SIZE: \"How big are the spots? \" (Inches, centimeters or compare to size of a coin)       Ranges from quarter sized to half dollar     5. ONSET: \"When did the rash start? \"       9/19/21    6. ITCHING: \"Does the rash itch? \" If so, ask: \"How bad is the itch? \"  (Scale 1-10; or mild, moderate, severe)      No    7. PAIN: \"Does the rash hurt? \" If so, ask: \"How bad is the pain? \"  (Scale 1-10; or mild, moderate, severe)      9/10 when initially standing    8. OTHER SYMPTOMS: \"Do you have any other symptoms? \" (e.g., fever)      Nausea, vomiting, diarrhea    9. PREGNANCY: \"Is there any chance you are pregnant? \" \"When was your last menstrual period? \"      NA    Protocols used: RASH OR REDNESS - LOCALIZED-ADULT-[place]

## 2021-09-21 NOTE — PATIENT INSTRUCTIONS
Your COVID 19 test can take 1-5 days for the results to come back. We ask that you make a Mychart page and view your test results this way. You will need to Self quarantine until you know your results. Increase fluids and rest  Saline nasal spray as needed for nasal congestion  Warm salt gargles as needed for throat discomfort  Monitor temperature twice a day  Tylenol as needed for fevers and/or discomfort. Big deep breaths periodically throughout the day  Regular Mucinex over the counter as needed for chest congestion  If symptoms worsen -Go to the ER. Follow up with your primary care provider      To Whom it May Concern:    Zahra Butler was tested for COVID-19 9/21/2021. He/she must stay home until test results are back. If test is positive, he/she must quarantine for a total of 10 days starting from day one of symptom onset. He/she must also be fever-free for 24 hours at that time, and also have improvement in symptoms. We do not recommend retesting as patients may continue to test positive for months even though no longer contagious. It is suggested you call [address] or  [place] [place]ie with any questions regarding quarantine timeframe/return to work/school details.

## 2021-09-21 NOTE — PROGRESS NOTES
9/21/2021    HPI:  Chief complaint and history of present illness as per medical assistant/nurse documented today in the Flu/COVID-19 clinic. Patient is here with complaints of cough, nausea, vomiting - resolved, diarrhea - resolved, and fatigue x 3 days. Patient states he has had his covid vaccine. Patient states he did have covid in January. MEDICATIONS:  Prior to Visit Medications    Medication Sig Taking?  Authorizing Provider   ondansetron (ZOFRAN-ODT) 4 MG disintegrating tablet Take 1 tablet by mouth every 8 hours as needed for Nausea or Vomiting Yes FABIENNE Thomas - CNP   furosemide (LASIX) 20 MG tablet TAKE 1 TABLET BY MOUTH 2 TIMES DAILY  Emmanuel Moscoso MD   ipratropium (ATROVENT) 0.06 % nasal spray USE 2 SPRAYS BY NASAL ROUTE 3 TIMES DAILY  Emmanuel Moscoso MD   tamsulosin [facility]) 0.4 MG capsule Take 1 capsule by mouth daily  Emmanuel Moscoso MD   tadalafil (CIALIS) 5 MG tablet Take 1 tablet by mouth daily  Emmanuel Moscoso MD   pantoprazole (PROTONIX) 40 MG tablet Take 1 tablet by mouth daily  Emmanuel Moscoso MD   montelukast (SINGULAIR) 10 MG tablet Take 1 tablet by mouth nightly  Emmanuel Moscoso MD   clopidogrel (PLAVIX) 75 MG tablet Take 1 tablet by mouth daily  Emmanuel Moscoso MD   carvedilol (COREG) 6.25 MG tablet Take 1 tablet by mouth 2 times daily (with meals)  Emmanuel Moscoso MD   atorvastatin (LIPITOR) 20 MG tablet Take 1 tablet by mouth nightly  Emmanuel Moscoso MD   albuterol sulfate HFA (VENTOLIN HFA) 108 (90 Base) MCG/ACT inhaler Inhale 2 puffs into the lungs 4 times daily as needed for Wheezing  Emmanuel Moscoso MD   aspirin 81 MG chewable tablet Take 1 tablet by mouth daily  Emmanuel Moscoso MD       Allergies   Allergen Reactions    Wellbutrin [Bupropion] Itching    Chantix [Varenicline Tartrate] Other (See Comments)     depression    Lisinopril Other (See Comments)     cough    Testosterone Other (See Comments)     Speeding - patient unsure of this allergy (20)   ,   Past Medical History:   Diagnosis Date    Aortic valve disease     Dr. Tavo El Ascending aortic aneurysm ([facility] [address] 2020    Surgically repaired 2020    BPH (benign prostatic hyperplasia) 2019    COPD (chronic obstructive pulmonary disease) ([facility] [address]     Hx: smoking - no pulmonologist as of 20    Coronary arteriosclerosis 2019    Dr. Caity Thompson    GERD (gastroesophageal reflux disease) 2019    Takes Zantac    Impotence 2019    Skin abnormality     on nose - takes Doxycycline    Wears dentures     top & bottom   ,   Past Surgical History:   Procedure Laterality Date    AORTIC VALVE REPLACEMENT N/A 2020    Dr. Korey Hammer N/A 2020    MINIMALLY INVASIVE AORTIC VALVE REPLACEMENT, INTRAOPERATIVE RAY, INDUCED HYPOTHERMIA performed by Monika Richardson MD at [address] [place] [address] Blvd  2012   [address] [place]  2012    COLONOSCOPY      Normal exam per pt - Dr. Millicent Inman?  COLONOSCOPY      CORONARY ANGIOPLASTY WITH STENT PLACEMENT       - Dr. Kendra Tony;    - (RCA) by Dr. Bree Montalvo Right     Inguinal    LUMBAR DISCECTOMY  2004    L4- L5    OTHER SURGICAL HISTORY      genital warts excised    THORACIC AORTIC ANEURYSM REPAIR N/A 2020    THORACIC ASCENDING AORTIC ANEURYSM REPAIR performed by Monika Richardson MD at [facility]   ,   Social History     Tobacco Use    Smoking status: Former Smoker     Packs/day: 2.50     Years: 41.00     Pack years: 102.50     Types: Cigarettes     Start date:      Quit date: 2017     Years since quittin.7    Smokeless tobacco: Never Used   Vaping Use    Vaping Use: Never used   Substance Use Topics    Alcohol use:  Yes     Alcohol/week: 3.0 standard drinks     Types: 3 Shots of liquor per week     Comment: occaisional    Drug use: No   ,   Family History   Problem Relation Age of Onset    Stroke Mother     Hypertension Mother     Coronary Art Dis Other 64        uncle   ,   Immunization History   Administered Date(s) Administered    COVID-19, Pfizer, PF, 30mcg/0.3mL 03/17/2021, 04/07/2021    Influenza Virus Vaccine 10/04/2010, 11/17/2011, 11/16/2016, 12/04/2017, 11/28/2018    Tdap (Boostrix, Adacel) 05/31/2017   ,   Health Maintenance   Topic Date Due    Hepatitis C screen  Never done    Pneumococcal 0-64 years Vaccine (1 of 2 - PPSV23) Never done    HIV screen  Never done    Shingles Vaccine (1 of 2) Never done    Low dose CT lung screening  11/26/2020    Flu vaccine (1) 09/01/2021    Potassium monitoring  05/07/2022    Creatinine monitoring  05/07/2022    Diabetes screen  12/16/2022    Colon cancer screen colonoscopy  06/04/2023    Lipid screen  05/07/2026    DTaP/Tdap/Td vaccine (2 - Td or Tdap) 05/31/2027    COVID-19 Vaccine  Completed    Hepatitis A vaccine  Aged Out    Hepatitis B vaccine  Aged Out    Hib vaccine  Aged Out    Meningococcal (ACWY) vaccine  Aged Out       PHYSICAL EXAM:  Physical Exam  Constitutional:       Appearance: Normal appearance. HENT:      Head: Normocephalic. Right Ear: Tympanic membrane, ear canal and external ear normal.      Left Ear: Tympanic membrane, ear canal and external ear normal.      Nose: Nose normal.      Mouth/Throat:      Lips: Pink. Mouth: Mucous membranes are moist.      Pharynx: Oropharynx is clear. Cardiovascular:      Rate and Rhythm: Normal rate and regular rhythm. Heart sounds: Normal heart sounds. Pulmonary:      Effort: Pulmonary effort is normal.      Breath sounds: Normal breath sounds. Musculoskeletal:      Cervical back: Neck supple. Skin:     General: Skin is warm and dry. Neurological:      Mental Status: He is alert and oriented to person, place, and time. Psychiatric:         Mood and Affect: Mood normal.         Behavior: Behavior normal.         ASSESSMENT/PLAN:  1. Cough  Your COVID 19 test can take 1-5 days for the results to come back. We ask that you make a Mychart page and view your test results this way. You will need to Self quarantine until you know your results. Increase fluids and rest  Saline nasal spray as needed for nasal congestion  Warm salt gargles as needed for throat discomfort  Monitor temperature twice a day  Tylenol as needed for fevers and/or discomfort. Big deep breaths periodically throughout the day  Regular Mucinex over the counter as needed for chest congestion  If symptoms worsen -Go to the ER. Follow up with your primary care provider  - Covid-19 Ambulatory    2. Nausea  - Covid-19 Ambulatory  - ondansetron (ZOFRAN-ODT) 4 MG disintegrating tablet; Take 1 tablet by mouth every 8 hours as needed for Nausea or Vomiting  Dispense: 15 tablet; Refill: 0    3. Fatigue, unspecified type  - Covid-19 Ambulatory    4. Rash      5. Pain and swelling of right lower extremity  - VL LOWER EXTREMITY VENOUS RIGHT; Future         FOLLOW-UP:  Return if symptoms worsen or fail to improve.     In addition to other information, the printed after visit summary provided to the patient includes:  [x] COVID-19 Self care instructions  [x] COVID-19 General patient information

## 2021-09-21 NOTE — PROGRESS NOTES
9/21/21  Lupis Moreno  1960    FLU/COVID-19 CLINIC EVALUATION    HPI SYMPTOMS:    Employer:[employer]    [] Fevers  [] Chills  [x] Cough  [] Coughing up blood  [] Chest Congestion  [] Nasal Congestion  [] Feeling short of breath  [] Sometimes  [] Frequently  [] All the time  [] Chest pain  [] Headaches  []Tolerable  [] Severe  [] Sore throat  [] Muscle aches  [x] Nausea  [x] Vomiting  []Unable to keep fluids down  [x] Diarrhea  []Severe    [x] OTHER SYMPTOMS:    Fatigue  Upset Stomach  Symptom Duration:   [] 1  [] 2   [x] 3   [] 4    [] 5   [] 6   [] 7   [] 8   [] 9   [] 10   [] 11   [] 12   [] 13   [] 14   [] Longer than 14 days    Symptom course:   [] Worsening     [x] Stable     [] Improving    RISK FACTORS:    [] Pregnant or possibly pregnant  [] Age over 61  [] Diabetes  [x] Heart disease  [] Asthma  [x] COPD/Other chronic lung diseases  [] Active Cancer  [] On Chemotherapy  [] Taking oral steroids  [] History Lymphoma/Leukemia  [] Close contact with a lab confirmed COVID-19 patient within 14 days of symptom onset  [] History of travel from affected geographical areas within 14 days of symptom onset       VITALS:  There were no vitals filed for this visit. TESTS:    POCT FLU:  [] Positive     []Negative    ASSESSMENT:    [] Flu  [] Possible COVID-19  [] Strep    PLAN:    [] Discharge home with written instructions for:  [] Flu management  [] Possible COVID-19 infection with self-quarantine and management of symptoms  [] Follow-up with primary care physician or emergency department if worsens  [] Evaluation per physician/NP/PA in clinic  [] Sent to ER       An  electronic signature was used to authenticate this note.      --Evelyn Sotomayor on 9/21/2021 at 10:02 AM

## 2021-09-22 LAB
SARS-COV-2: NOT DETECTED
SOURCE: NORMAL

## 2021-09-24 ENCOUNTER — HOSPITAL ENCOUNTER (EMERGENCY)
Age: 61
Discharge: HOME | End: 2021-09-24
Attending: EMERGENCY MEDICINE
Payer: COMMERCIAL

## 2021-09-24 VITALS
HEART RATE: 55 BPM | RESPIRATION RATE: 16 BRPM | DIASTOLIC BLOOD PRESSURE: 67 MMHG | HEIGHT: 71 IN | TEMPERATURE: 97.1 F | WEIGHT: 283 LBS | OXYGEN SATURATION: 94 % | BODY MASS INDEX: 39.62 KG/M2 | SYSTOLIC BLOOD PRESSURE: 125 MMHG

## 2021-09-24 DIAGNOSIS — L03.115 CELLULITIS OF RIGHT LOWER EXTREMITY: Primary | ICD-10-CM

## 2021-09-24 DIAGNOSIS — M79.89 LEG SWELLING: ICD-10-CM

## 2021-09-24 PROCEDURE — 6370000000 HC RX 637 (ALT 250 FOR IP): Performed by: EMERGENCY MEDICINE

## 2021-09-24 PROCEDURE — 99283 EMERGENCY DEPT VISIT LOW MDM: CPT

## 2021-09-24 RX ORDER — CEPHALEXIN 250 MG/1
500 CAPSULE ORAL ONCE
Status: COMPLETED | OUTPATIENT
Start: 2021-09-24 | End: 2021-09-24

## 2021-09-24 RX ORDER — SULFAMETHOXAZOLE AND TRIMETHOPRIM 800; 160 MG/1; MG/1
1 TABLET ORAL ONCE
Status: COMPLETED | OUTPATIENT
Start: 2021-09-24 | End: 2021-09-24

## 2021-09-24 RX ORDER — SULFAMETHOXAZOLE AND TRIMETHOPRIM 800; 160 MG/1; MG/1
1 TABLET ORAL 2 TIMES DAILY
Qty: 14 TABLET | Refills: 0 | Status: SHIPPED | OUTPATIENT
Start: 2021-09-24 | End: 2021-10-01

## 2021-09-24 RX ORDER — CEPHALEXIN 500 MG/1
500 CAPSULE ORAL 4 TIMES DAILY
Qty: 28 CAPSULE | Refills: 0 | Status: SHIPPED | OUTPATIENT
Start: 2021-09-24 | End: 2021-10-01

## 2021-09-24 RX ADMIN — SULFAMETHOXAZOLE AND TRIMETHOPRIM 1 TABLET: 800; 160 TABLET ORAL at 19:59

## 2021-09-24 RX ADMIN — CEPHALEXIN 500 MG: 250 CAPSULE ORAL at 19:59

## 2021-09-24 ASSESSMENT — ENCOUNTER SYMPTOMS
ABDOMINAL PAIN: 0
DIARRHEA: 0
SHORTNESS OF BREATH: 0
EYE PAIN: 0
RHINORRHEA: 0
COUGH: 0
EYE DISCHARGE: 0
SORE THROAT: 0
BACK PAIN: 0
VOMITING: 0

## 2021-09-24 ASSESSMENT — PAIN DESCRIPTION - PAIN TYPE: TYPE: ACUTE PAIN

## 2021-09-24 ASSESSMENT — PAIN SCALES - GENERAL: PAINLEVEL_OUTOF10: 6

## 2021-09-24 ASSESSMENT — PAIN DESCRIPTION - ORIENTATION: ORIENTATION: LOWER

## 2021-09-24 ASSESSMENT — PAIN DESCRIPTION - LOCATION: LOCATION: LEG

## 2021-09-24 NOTE — ED PROVIDER NOTES
[address] [facility] ENCOUNTER      Pt Name: Lupis Moreno  MRN: [id number]  [place] 1960  Date of evaluation: 9/24/2021  Provider: Shaina Sanchez MD    CHIEF COMPLAINT       Chief Complaint   Patient presents with    Leg Swelling     right         HISTORY OF PRESENT ILLNESS      Lupis Moreno is a 63 y.o. male who presents to the emergency department  for   Chief Complaint   Patient presents with    Leg Swelling     right       44-year-old male presents with 5 days of right leg swelling and redness. He denies any trauma or injury to the leg. No history of DVT or PE. Does have remarkable cardiac history. He is on antiplatelet medications. He was seen 3 days ago for an ultrasound to evaluate for DVT and that was negative. He states he continues to have swelling in the leg despite using measures like elevating it to help it. He was prescribed topical triamcinolone for \"rash. \"  He states his symptoms are not improving. Denies any shortness of breath. No chest pain. No shortness of breath. He states his breathing is at baseline. Denies any fever or chills. He does have a history of heart failure. He endorses adherence with his Lasix. Denies any dietary indiscretions. He is walking without difficulty. No numbness or tingling in the leg. He does not identify any exacerbating alleviating factors. Nursing Notes, Triage Notes & Vital Signs were reviewed. REVIEW OF SYSTEMS    (2-9 systems for level 4, 10 or more for level 5)     Review of Systems   Constitutional: Negative for chills and fever. HENT: Negative for congestion, rhinorrhea and sore throat. Eyes: Negative for pain and discharge. Respiratory: Negative for cough and shortness of breath. Cardiovascular: Positive for leg swelling. Negative for chest pain and palpitations. Gastrointestinal: Negative for abdominal pain, diarrhea and vomiting.    Endocrine: Negative for polydipsia and polyuria. Genitourinary: Negative for dysuria and flank pain. Musculoskeletal: Negative for back pain and neck pain. Skin: Negative for pallor and wound. Neurological: Negative for dizziness, seizures, facial asymmetry, light-headedness, numbness and headaches. Psychiatric/Behavioral: Negative for confusion. Except as noted above the remainder of the review of systems was reviewed and negative. PAST MEDICAL HISTORY     Past Medical History:   Diagnosis Date    Aortic valve disease     Dr. Tavo El Ascending aortic aneurysm ([facility] [address] 1/20/2020    Surgically repaired 1/2020    BPH (benign prostatic hyperplasia) 7/27/2019    COPD (chronic obstructive pulmonary disease) ([facility] [address]     Hx: smoking - no pulmonologist as of 1/14/20    Coronary arteriosclerosis 7/27/2019    Dr. Caity Thompson    GERD (gastroesophageal reflux disease) 07/27/2019    Takes Zantac    Impotence 7/27/2019    Skin abnormality     on nose - takes Doxycycline    Wears dentures     top & bottom       Prior to Admission medications    Medication Sig Start Date End Date Taking? Authorizing Provider   cephALEXin (KEFLEX) 500 MG capsule Take 1 capsule by mouth 4 times daily for 7 days 9/24/21 10/1/21 Yes Steffi Elias MD   sulfamethoxazole-trimethoprim (BACTRIM DS) 800-160 MG per tablet Take 1 tablet by mouth 2 times daily for 7 days 9/24/21 10/1/21 Yes Steffi Elias MD   ondansetron (ZOFRAN-ODT) 4 MG disintegrating tablet Take 1 tablet by mouth every 8 hours as needed for Nausea or Vomiting 9/21/21   FABIENNE Paredes CNP   triamcinolone (KENALOG) 0.1 % cream Apply topically 2 times daily x 10 days.  9/21/21   FABIENNE Paredes CNP   furosemide (LASIX) 20 MG tablet TAKE 1 TABLET BY MOUTH 2 TIMES DAILY 7/23/21 10/21/21  Weston Sheikh MD   ipratropium (ATROVENT) 0.06 % nasal spray USE 2 SPRAYS BY NASAL ROUTE 3 TIMES DAILY 7/23/21   Weston Sheikh MD   tamsulosin [facility]) 0.4 MG capsule Take 1 capsule by mouth daily 4/29/21 7/28/21  Retia Life, MD   tadalafil (CIALIS) 5 MG tablet Take 1 tablet by mouth daily 4/29/21 7/28/21  Retia Life, MD   pantoprazole (PROTONIX) 40 MG tablet Take 1 tablet by mouth daily 4/29/21 7/28/21  Retia Life, MD   montelukast (SINGULAIR) 10 MG tablet Take 1 tablet by mouth nightly 4/29/21 7/28/21  Retia Life, MD   clopidogrel (PLAVIX) 75 MG tablet Take 1 tablet by mouth daily 4/29/21 7/28/21  Retia Life, MD   carvedilol (COREG) 6.25 MG tablet Take 1 tablet by mouth 2 times daily (with meals) 4/29/21 10/26/21  Retia Life, MD   atorvastatin (LIPITOR) 20 MG tablet Take 1 tablet by mouth nightly 4/29/21 7/28/21  Retia Life, MD   albuterol sulfate HFA (VENTOLIN HFA) 108 (90 Base) MCG/ACT inhaler Inhale 2 puffs into the lungs 4 times daily as needed for Wheezing 2/16/21 4/29/21  Retia Life, MD   aspirin 81 MG chewable tablet Take 1 tablet by mouth daily 12/15/20 4/29/21  Zachariah Phelps, MD        Patient Active Problem List   Diagnosis    GERD (gastroesophageal reflux disease)    Impotence    Coronary arteriosclerosis    BPH (benign prostatic hyperplasia)    Essential hypertension    Pure hypercholesterolemia    Aortic valve disease    COPD (chronic obstructive pulmonary disease) ([facility] [address]         SURGICAL HISTORY       Past Surgical History:   Procedure Laterality Date    AORTIC VALVE REPLACEMENT N/A 01/20/2020    Dr. James Jackson N/A 1/20/2020    MINIMALLY INVASIVE AORTIC VALVE REPLACEMENT, INTRAOPERATIVE RAY, INDUCED HYPOTHERMIA performed by Isabela Copeland MD at [address] [facility] [address]  2012   [address] [place]  2012    COLONOSCOPY  2012    Normal exam per pt - Dr. Mojgan Canales?     COLONOSCOPY  2017    CORONARY ANGIOPLASTY WITH STENT PLACEMENT      2010 - Dr. Mckeon End;   2017 - (RCA) by Dr. Bagley Postin Right 1990's    Inguinal    LUMBAR DISCECTOMY  03/2004    L4- L5    OTHER SURGICAL HISTORY      genital warts excised    THORACIC AORTIC ANEURYSM REPAIR N/A 1/20/2020    THORACIC ASCENDING AORTIC ANEURYSM REPAIR performed by Corbin Estrada MD at [id number]       Previous Medications    ALBUTEROL SULFATE HFA (VENTOLIN HFA) 108 (90 BASE) MCG/ACT INHALER    Inhale 2 puffs into the lungs 4 times daily as needed for Wheezing    ASPIRIN 81 MG CHEWABLE TABLET    Take 1 tablet by mouth daily    ATORVASTATIN (LIPITOR) 20 MG TABLET    Take 1 tablet by mouth nightly    CARVEDILOL (COREG) 6.25 MG TABLET    Take 1 tablet by mouth 2 times daily (with meals)    CLOPIDOGREL (PLAVIX) 75 MG TABLET    Take 1 tablet by mouth daily    FUROSEMIDE (LASIX) 20 MG TABLET    TAKE 1 TABLET BY MOUTH 2 TIMES DAILY    IPRATROPIUM (ATROVENT) 0.06 % NASAL SPRAY    USE 2 SPRAYS BY NASAL ROUTE 3 TIMES DAILY    MONTELUKAST (SINGULAIR) 10 MG TABLET    Take 1 tablet by mouth nightly    ONDANSETRON (ZOFRAN-ODT) 4 MG DISINTEGRATING TABLET    Take 1 tablet by mouth every 8 hours as needed for Nausea or Vomiting    PANTOPRAZOLE (PROTONIX) 40 MG TABLET    Take 1 tablet by mouth daily    TADALAFIL (CIALIS) 5 MG TABLET    Take 1 tablet by mouth daily    TAMSULOSIN (FLOMAX) 0.4 MG CAPSULE    Take 1 capsule by mouth daily    TRIAMCINOLONE (KENALOG) 0.1 % CREAM    Apply topically 2 times daily x 10 days.        ALLERGIES     Wellbutrin [bupropion], Chantix [varenicline tartrate], Lisinopril, and Testosterone    FAMILY HISTORY       Family History   Problem Relation Age of Onset    Stroke Mother     Hypertension Mother     Coronary Art Dis Other 64        uncle          SOCIAL HISTORY       Social History     Socioeconomic History    Marital status:      Spouse name: None    Number of children: None    Years of education: None    Highest education level: None   Occupational History    None   Tobacco Use    Smoking status: Former Smoker     Packs/day: 2.50     Years: 41.00     Pack years: 102.50     Types: Cigarettes     Start date: 14     Quit date: 2017     Years since quittin.7    Smokeless tobacco: Never Used   Vaping Use    Vaping Use: Never used   Substance and Sexual Activity    Alcohol use: Yes     Alcohol/week: 3.0 standard drinks     Types: 3 Shots of liquor per week     Comment: occaisional    Drug use: No    Sexual activity: None   Other Topics Concern    None   Social History Narrative    None     Social Determinants of Health     Financial Resource Strain: Low Risk     Difficulty of Paying Living Expenses: Not hard at all   Food Insecurity: No Food Insecurity    Worried About Running Out of Food in the Last Year: Never true    Aviva of Food in the Last Year: Never true   Transportation Needs: No Transportation Needs    Lack of Transportation (Medical): No    Lack of Transportation (Non-Medical): No   Physical Activity:     Days of Exercise per Week:     Minutes of Exercise per Session:    Stress:     Feeling of Stress :    Social Connections:     Frequency of Communication with Friends and Family:     Frequency of Social Gatherings with Friends and Family:     Attends Mu-ism Services:     Active Member of Clubs or Organizations:     Attends Club or Organization Meetings:     Marital Status:    Intimate Partner Violence:     Fear of Current or Ex-Partner:     Emotionally Abused:     Physically Abused:     Sexually Abused:        SCREENINGS               PHYSICAL EXAM    (up to 7 for level 4, 8 or more for level 5)     ED Triage Vitals [21]   BP Temp Temp Source Pulse Resp SpO2 Height Weight   125/67 97.1 °F (36.2 °C) Infrared 55 16 94 % 5' 11\" (1.803 m) 283 lb (128.4 kg)       Physical Exam  Vitals reviewed. Constitutional:       Appearance: He is not ill-appearing or toxic-appearing. HENT:      Head: Normocephalic and atraumatic. Nose: No congestion or rhinorrhea. patient. CONSULTS:  None    PROCEDURES:  None performed unless otherwise noted below     Procedures        FINAL IMPRESSION      1. Cellulitis of right lower extremity    2. Leg swelling          DISPOSITION/PLAN   DISPOSITION Discharge - Pending Orders Complete 09/24/2021 07:53:50 PM      PATIENT REFERRED TO:  Weston Sheikh MD  [facility]  [phone]    Schedule an appointment as soon as possible for a visit in 2 days        DISCHARGE MEDICATIONS:  New Prescriptions    CEPHALEXIN (KEFLEX) 500 MG CAPSULE    Take 1 capsule by mouth 4 times daily for 7 days    SULFAMETHOXAZOLE-TRIMETHOPRIM (BACTRIM DS) 800-160 MG PER TABLET    Take 1 tablet by mouth 2 times daily for 7 days       ED Provider Disposition Time  DISPOSITION Discharge - Pending Orders Complete 09/24/2021 07:53:50 PM      Appropriate personal protective equipment was worn during the patient's evaluation. These included surgical, eye protection, surgical mask or in 95 respirator and gloves. The patient was also placed in a surgical mask for the prevention of possible spread of respiratory viral illnesses. The Patient was instructed to read the package inserts with any medication that was prescribed. Major potential reactions and medication interactions were discussed. The Patient understands that there are numerous possible adverse reactions not covered. The patient was also instructed to arrange follow-up with his or her primary care provider for review of any pending labwork or incidental findings on any radiology results that were obtained. All efforts were made to discuss any incidental findings that require further monitoring. Controlled Substances Monitoring:     No flowsheet data found.     (Please note that portions of this note were completed with a voice recognition program.  Efforts were made to edit the dictations but occasionally words are mis-transcribed.)    Steffi Elias MD (electronically signed)  Attending Emergency Physician           Giancarlo Garcia MD  09/24/21 2002

## 2021-10-05 DIAGNOSIS — I25.10 CORONARY ARTERIOSCLEROSIS: ICD-10-CM

## 2021-10-05 DIAGNOSIS — E78.00 PURE HYPERCHOLESTEROLEMIA: ICD-10-CM

## 2021-10-05 DIAGNOSIS — I10 ESSENTIAL HYPERTENSION: ICD-10-CM

## 2021-10-05 DIAGNOSIS — R05.9 COUGH: ICD-10-CM

## 2021-10-06 RX ORDER — ATORVASTATIN CALCIUM 20 MG/1
20 TABLET, FILM COATED ORAL NIGHTLY
Qty: 90 TABLET | Refills: 0 | Status: SHIPPED | OUTPATIENT
Start: 2021-10-06 | End: 2022-04-28 | Stop reason: SDUPTHER

## 2021-10-06 RX ORDER — MONTELUKAST SODIUM 10 MG/1
10 TABLET ORAL NIGHTLY
Qty: 90 TABLET | Refills: 0 | Status: SHIPPED | OUTPATIENT
Start: 2021-10-06 | End: 2022-04-01

## 2021-10-06 RX ORDER — CLOPIDOGREL BISULFATE 75 MG/1
75 TABLET ORAL DAILY
Qty: 90 TABLET | Refills: 0 | Status: SHIPPED | OUTPATIENT
Start: 2021-10-06 | End: 2022-04-28 | Stop reason: SDUPTHER

## 2021-10-06 RX ORDER — TAMSULOSIN HYDROCHLORIDE 0.4 MG/1
0.4 CAPSULE ORAL DAILY
Qty: 90 CAPSULE | Refills: 0 | Status: SHIPPED | OUTPATIENT
Start: 2021-10-06 | End: 2022-04-28 | Stop reason: SDUPTHER

## 2021-10-06 RX ORDER — CARVEDILOL 6.25 MG/1
6.25 TABLET ORAL 2 TIMES DAILY WITH MEALS
Qty: 180 TABLET | Refills: 0 | Status: SHIPPED | OUTPATIENT
Start: 2021-10-06 | End: 2022-04-28 | Stop reason: SDUPTHER

## 2021-10-06 RX ORDER — ALBUTEROL SULFATE 90 UG/1
2 AEROSOL, METERED RESPIRATORY (INHALATION) 4 TIMES DAILY PRN
Qty: 1 EACH | Refills: 0 | Status: SHIPPED | OUTPATIENT
Start: 2021-10-06 | End: 2022-04-05

## 2021-10-06 RX ORDER — PANTOPRAZOLE SODIUM 40 MG/1
40 TABLET, DELAYED RELEASE ORAL DAILY
Qty: 90 TABLET | Refills: 0 | Status: SHIPPED | OUTPATIENT
Start: 2021-10-06 | End: 2022-04-28 | Stop reason: SDUPTHER

## 2021-10-06 RX ORDER — TADALAFIL 5 MG/1
5 TABLET ORAL DAILY
Qty: 90 TABLET | Refills: 0 | Status: SHIPPED | OUTPATIENT
Start: 2021-10-06 | End: 2022-01-10 | Stop reason: SDUPTHER

## 2021-10-06 RX ORDER — FUROSEMIDE 20 MG/1
20 TABLET ORAL 2 TIMES DAILY
Qty: 180 TABLET | Refills: 0 | Status: SHIPPED | OUTPATIENT
Start: 2021-10-06 | End: 2022-01-25

## 2021-10-28 ENCOUNTER — OFFICE VISIT (OUTPATIENT)
Dept: FAMILY MEDICINE CLINIC | Age: 61
End: 2021-10-28
Payer: COMMERCIAL

## 2021-10-28 VITALS
BODY MASS INDEX: 38.64 KG/M2 | WEIGHT: 276 LBS | HEART RATE: 59 BPM | SYSTOLIC BLOOD PRESSURE: 118 MMHG | DIASTOLIC BLOOD PRESSURE: 80 MMHG | OXYGEN SATURATION: 97 % | HEIGHT: 71 IN

## 2021-10-28 DIAGNOSIS — L91.8 SKIN TAG: ICD-10-CM

## 2021-10-28 DIAGNOSIS — E78.00 PURE HYPERCHOLESTEROLEMIA: Primary | ICD-10-CM

## 2021-10-28 DIAGNOSIS — L71.9 ACNE ROSACEA: ICD-10-CM

## 2021-10-28 DIAGNOSIS — I25.10 CORONARY ARTERIOSCLEROSIS: ICD-10-CM

## 2021-10-28 DIAGNOSIS — I10 ESSENTIAL HYPERTENSION: ICD-10-CM

## 2021-10-28 DIAGNOSIS — F41.1 GAD (GENERALIZED ANXIETY DISORDER): ICD-10-CM

## 2021-10-28 PROCEDURE — 99214 OFFICE O/P EST MOD 30 MIN: CPT | Performed by: FAMILY MEDICINE

## 2021-10-28 RX ORDER — DOXYCYCLINE HYCLATE 100 MG/1
100 CAPSULE ORAL DAILY
Qty: 90 CAPSULE | Refills: 1 | Status: SHIPPED | OUTPATIENT
Start: 2021-10-28 | End: 2022-03-21

## 2021-10-28 RX ORDER — ESCITALOPRAM OXALATE 10 MG/1
10 TABLET ORAL DAILY
Qty: 30 TABLET | Refills: 3 | Status: SHIPPED | OUTPATIENT
Start: 2021-10-28 | End: 2022-03-21 | Stop reason: SDUPTHER

## 2021-10-28 NOTE — PROGRESS NOTES
10/28/2021    Dayanna Franco    Chief Complaint   Patient presents with    6 Month Follow-Up    Anxiety    Skin Tag     below right eye       HPI    Deven Wolf is a 63 y.o. male who presents today with follow-up. Patient notes increased anxiety especially when being a passenger in a car. It does generalized irritability. Patient notes a skin tag below his right eye for the last couple months.         REVIEW OF SYSTEMS    Constitutional:  Denies fever, chills, weight loss or weakness  Eyes:  no photophobia or discharge  ENT:  no sore throat or ear pain  Cardiovascular:  Denies chest pain, palpitations or swelling  Respiratory:  Denies cough or shortness of breath  GI:  no abdominal pain, nausea, vomiting, or diarrhea  Musculoskeletal:  no back pain  Skin:  No rashes  Neurologic:  no headache, focal weakness, or sensory changes  Endocrine:  no polyuria or polydipsia      PAST MEDICAL HISTORY  Past Medical History:   Diagnosis Date    Aortic valve disease     Dr. Abigail Phillips Ascending aortic aneurysm ([facility] 1/20/2020    Surgically repaired 1/2020    BPH (benign prostatic hyperplasia) 7/27/2019    COPD (chronic obstructive pulmonary disease) ([facility]     Hx: smoking - no pulmonologist as of 1/14/20    Coronary arteriosclerosis 7/27/2019    Dr. Abigail Phillips GERD (gastroesophageal reflux disease) 07/27/2019    Takes Zantac    Impotence 7/27/2019    Skin abnormality     on nose - takes Doxycycline    Wears dentures     top & bottom       FAMILY HISTORY  Family History   Problem Relation Age of Onset    Stroke Mother     Hypertension Mother     Coronary Art Dis Other 64        uncle       SOCIAL HISTORY  Social History     Socioeconomic History    Marital status:      Spouse name: Not on file    Number of children: Not on file    Years of education: Not on file    Highest education level: Not on file   Occupational History    Not on file   Tobacco Use    Smoking status: Former Smoker     Packs/day: 2.50 PLACEMENT      2010 - Dr. Roma Gan;   2017 - (RCA) by Dr. Alexsandra Hernández Right 1990's    Inguinal    LUMBAR DISCECTOMY  03/2004    L4- L5    OTHER SURGICAL HISTORY      genital warts excised    THORACIC AORTIC ANEURYSM REPAIR N/A 1/20/2020    THORACIC ASCENDING AORTIC ANEURYSM REPAIR performed by Salvatore Hernandez MD at [facility] [facility]  Current Outpatient Medications   Medication Sig Dispense Refill    doxycycline hyclate (VIBRAMYCIN) 100 MG capsule Take 1 capsule by mouth daily 90 capsule 1    escitalopram (LEXAPRO) 10 MG tablet Take 1 tablet by mouth daily 30 tablet 3    montelukast (SINGULAIR) 10 MG tablet Take 1 tablet by mouth nightly 90 tablet 0    tamsulosin (FLOMAX) 0.4 MG capsule Take 1 capsule by mouth daily 90 capsule 0    clopidogrel (PLAVIX) 75 MG tablet Take 1 tablet by mouth daily 90 tablet 0    tadalafil (CIALIS) 5 MG tablet Take 1 tablet by mouth daily 90 tablet 0    carvedilol (COREG) 6.25 MG tablet Take 1 tablet by mouth 2 times daily (with meals) 180 tablet 0    pantoprazole (PROTONIX) 40 MG tablet Take 1 tablet by mouth daily 90 tablet 0    atorvastatin (LIPITOR) 20 MG tablet Take 1 tablet by mouth nightly 90 tablet 0    albuterol sulfate HFA (VENTOLIN HFA) 108 (90 Base) MCG/ACT inhaler Inhale 2 puffs into the lungs 4 times daily as needed for Wheezing 1 each 0    furosemide (LASIX) 20 MG tablet Take 1 tablet by mouth 2 times daily 180 tablet 0    ipratropium (ATROVENT) 0.06 % nasal spray USE 2 SPRAYS BY NASAL ROUTE 3 TIMES DAILY 1 Bottle 2    aspirin 81 MG chewable tablet Take 1 tablet by mouth daily 30 tablet 0     No current facility-administered medications for this visit.        ALLERGIES  Allergies   Allergen Reactions    Wellbutrin [Bupropion] Itching    Chantix [Varenicline Tartrate] Other (See Comments)     depression    Lisinopril Other (See Comments)     cough    Testosterone Other (See Comments)     Speeding - patient unsure of this allergy (1/14/20)       PHYSICAL EXAM  /80   Pulse 59   Ht 5' 11\" (1.803 m)   Wt 276 lb (125.2 kg)   SpO2 97%   BMI 38.49 kg/m²   1 x 2 x 3 mm skin tag below his right eye. No concerning features. Excellently controlled rosacea    ASSESSMENT & PLAN    1. Pure hypercholesterolemia  Reviewed prior labs at goal.  Remain on the same  Recheck labs just prior to next visit  - Lipid Panel; Future    2. MICKEY (generalized anxiety disorder)  Patient has been through counseling before. He prefers a trial of medication. Add Lexapro low-dose  - escitalopram (LEXAPRO) 10 MG tablet; Take 1 tablet by mouth daily  Dispense: 30 tablet; Refill: 3    3. Skin tag  Recommend patient cleanly tie a tight thread with 2 loops around tag    4. Acne rosacea  Issue controlled. Continue meds. Refilled meds. - doxycycline hyclate (VIBRAMYCIN) 100 MG capsule; Take 1 capsule by mouth daily  Dispense: 90 capsule; Refill: 1    5. Coronary arteriosclerosis  Issue controlled. Continue meds. Refilled meds. 6. Essential hypertension  Issue controlled. Continue meds. Refilled meds. - CBC Auto Differential; Future  - Comprehensive Metabolic Panel;  Future    Follow-up 4 to 6 months       Electronically signed by Arie Mckenna MD on 10/28/2021

## 2021-11-29 ENCOUNTER — TELEPHONE (OUTPATIENT)
Dept: FAMILY MEDICINE CLINIC | Age: 61
End: 2021-11-29

## 2021-11-29 DIAGNOSIS — R05.9 COUGH: ICD-10-CM

## 2021-11-29 RX ORDER — LORATADINE 10 MG/1
TABLET ORAL
Qty: 90 TABLET | Refills: 1 | Status: SHIPPED | OUTPATIENT
Start: 2021-11-29 | End: 2022-03-01

## 2021-11-29 NOTE — TELEPHONE ENCOUNTER
----- Message from Mireille Delgado sent at 11/26/2021  8:56 AM EST -----  Subject: Refill Request    QUESTIONS  Name of Medication? loratadine (CLARITIN) 10 MG tablet  Patient-reported dosage and instructions? 10 mg   How many days do you have left? 0  Preferred Pharmacy? [facility]/PHARMACY #[id number]  Pharmacy phone number (if available)? [phone]  Additional Information for Provider? Pt. is out of medication would like a   refill. Please advise  ---------------------------------------------------------------------------  --------------  CALL BACK INFO  What is the best way for the office to contact you? OK to leave message on   voicemail  Preferred Call Back Phone Number?  [phone]

## 2021-11-29 NOTE — TELEPHONE ENCOUNTER
Requested Prescriptions     Signed Prescriptions Disp Refills    loratadine (CLARITIN) 10 MG tablet 90 tablet 1     Sig: TAKE 1 TABLET BY MOUTH EVERY DAY     Authorizing Provider: Nessa Echeverria     Ordering User: Palak Pearce

## 2022-01-23 DIAGNOSIS — I10 ESSENTIAL HYPERTENSION: ICD-10-CM

## 2022-01-25 RX ORDER — FUROSEMIDE 20 MG/1
TABLET ORAL
Qty: 180 TABLET | Refills: 0 | Status: SHIPPED | OUTPATIENT
Start: 2022-01-25 | End: 2022-04-28 | Stop reason: SDUPTHER

## 2022-03-20 DIAGNOSIS — L71.9 ACNE ROSACEA: ICD-10-CM

## 2022-03-20 DIAGNOSIS — F41.1 GAD (GENERALIZED ANXIETY DISORDER): ICD-10-CM

## 2022-03-21 RX ORDER — ESCITALOPRAM OXALATE 10 MG/1
TABLET ORAL
Qty: 30 TABLET | Refills: 3 | OUTPATIENT
Start: 2022-03-21

## 2022-03-21 RX ORDER — DOXYCYCLINE HYCLATE 100 MG/1
CAPSULE ORAL
Qty: 90 CAPSULE | Refills: 1 | Status: SHIPPED | OUTPATIENT
Start: 2022-03-21 | End: 2022-04-28 | Stop reason: SDUPTHER

## 2022-03-25 DIAGNOSIS — F41.1 GAD (GENERALIZED ANXIETY DISORDER): ICD-10-CM

## 2022-03-25 RX ORDER — ESCITALOPRAM OXALATE 10 MG/1
10 TABLET ORAL DAILY
Qty: 90 TABLET | Refills: 0 | Status: SHIPPED | OUTPATIENT
Start: 2022-03-25 | End: 2022-04-28 | Stop reason: SDUPTHER

## 2022-04-01 RX ORDER — MONTELUKAST SODIUM 10 MG/1
TABLET ORAL
Qty: 30 TABLET | Refills: 0 | Status: SHIPPED | OUTPATIENT
Start: 2022-04-01 | End: 2022-04-28 | Stop reason: SDUPTHER

## 2022-04-05 DIAGNOSIS — R05.9 COUGH: ICD-10-CM

## 2022-04-05 RX ORDER — ALBUTEROL SULFATE 90 UG/1
2 AEROSOL, METERED RESPIRATORY (INHALATION) 4 TIMES DAILY PRN
Qty: 1 EACH | Refills: 0 | Status: SHIPPED | OUTPATIENT
Start: 2022-04-05 | End: 2022-05-05

## 2022-04-28 ENCOUNTER — OFFICE VISIT (OUTPATIENT)
Dept: FAMILY MEDICINE CLINIC | Age: 62
End: 2022-04-28
Payer: COMMERCIAL

## 2022-04-28 VITALS
OXYGEN SATURATION: 96 % | DIASTOLIC BLOOD PRESSURE: 78 MMHG | SYSTOLIC BLOOD PRESSURE: 120 MMHG | BODY MASS INDEX: 40.54 KG/M2 | WEIGHT: 289.6 LBS | HEIGHT: 71 IN | HEART RATE: 63 BPM

## 2022-04-28 DIAGNOSIS — Z76.89 ENCOUNTER TO ESTABLISH CARE WITH NEW DOCTOR: ICD-10-CM

## 2022-04-28 DIAGNOSIS — F41.1 GAD (GENERALIZED ANXIETY DISORDER): ICD-10-CM

## 2022-04-28 DIAGNOSIS — I10 ESSENTIAL HYPERTENSION: ICD-10-CM

## 2022-04-28 DIAGNOSIS — N52.9 IMPOTENCE: ICD-10-CM

## 2022-04-28 DIAGNOSIS — L71.9 ACNE ROSACEA: ICD-10-CM

## 2022-04-28 DIAGNOSIS — Z87.891 PERSONAL HISTORY OF TOBACCO USE: ICD-10-CM

## 2022-04-28 DIAGNOSIS — Z87.891 HISTORY OF TOBACCO USE: Primary | ICD-10-CM

## 2022-04-28 DIAGNOSIS — I25.10 CORONARY ARTERIOSCLEROSIS: ICD-10-CM

## 2022-04-28 DIAGNOSIS — K21.9 GASTROESOPHAGEAL REFLUX DISEASE, UNSPECIFIED WHETHER ESOPHAGITIS PRESENT: ICD-10-CM

## 2022-04-28 DIAGNOSIS — E78.00 PURE HYPERCHOLESTEROLEMIA: ICD-10-CM

## 2022-04-28 DIAGNOSIS — J43.9 PULMONARY EMPHYSEMA, UNSPECIFIED EMPHYSEMA TYPE (HCC): ICD-10-CM

## 2022-04-28 DIAGNOSIS — N40.0 BENIGN PROSTATIC HYPERPLASIA, UNSPECIFIED WHETHER LOWER URINARY TRACT SYMPTOMS PRESENT: ICD-10-CM

## 2022-04-28 PROCEDURE — 99214 OFFICE O/P EST MOD 30 MIN: CPT | Performed by: PHYSICIAN ASSISTANT

## 2022-04-28 PROCEDURE — G0296 VISIT TO DETERM LDCT ELIG: HCPCS | Performed by: PHYSICIAN ASSISTANT

## 2022-04-28 RX ORDER — LORATADINE 10 MG/1
TABLET ORAL
COMMUNITY
Start: 2022-03-20 | End: 2022-06-16

## 2022-04-28 RX ORDER — DOXYCYCLINE HYCLATE 100 MG/1
CAPSULE ORAL
Qty: 90 CAPSULE | Refills: 1 | Status: SHIPPED | OUTPATIENT
Start: 2022-04-28

## 2022-04-28 RX ORDER — ASPIRIN 81 MG/1
81 TABLET, CHEWABLE ORAL DAILY
Qty: 90 TABLET | Refills: 1 | Status: SHIPPED | OUTPATIENT
Start: 2022-04-28 | End: 2022-10-25

## 2022-04-28 RX ORDER — TADALAFIL 5 MG/1
5 TABLET ORAL DAILY
Qty: 90 TABLET | Refills: 1 | Status: SHIPPED | OUTPATIENT
Start: 2022-04-28 | End: 2022-07-27

## 2022-04-28 RX ORDER — AZELASTINE 1 MG/ML
SPRAY, METERED NASAL
COMMUNITY
Start: 2022-03-14

## 2022-04-28 RX ORDER — PANTOPRAZOLE SODIUM 40 MG/1
40 TABLET, DELAYED RELEASE ORAL DAILY
Qty: 90 TABLET | Refills: 1 | Status: SHIPPED | OUTPATIENT
Start: 2022-04-28 | End: 2022-10-25

## 2022-04-28 RX ORDER — CARVEDILOL 6.25 MG/1
6.25 TABLET ORAL 2 TIMES DAILY WITH MEALS
Qty: 180 TABLET | Refills: 0 | Status: SHIPPED | OUTPATIENT
Start: 2022-04-28 | End: 2022-07-27

## 2022-04-28 RX ORDER — ESCITALOPRAM OXALATE 10 MG/1
10 TABLET ORAL DAILY
Qty: 90 TABLET | Refills: 0 | Status: SHIPPED | OUTPATIENT
Start: 2022-04-28

## 2022-04-28 RX ORDER — MONTELUKAST SODIUM 10 MG/1
TABLET ORAL
Qty: 90 TABLET | Refills: 1 | Status: SHIPPED | OUTPATIENT
Start: 2022-04-28

## 2022-04-28 RX ORDER — ATORVASTATIN CALCIUM 20 MG/1
20 TABLET, FILM COATED ORAL NIGHTLY
Qty: 90 TABLET | Refills: 1 | Status: SHIPPED | OUTPATIENT
Start: 2022-04-28 | End: 2022-10-25

## 2022-04-28 RX ORDER — CLOPIDOGREL BISULFATE 75 MG/1
75 TABLET ORAL DAILY
Qty: 90 TABLET | Refills: 0 | Status: SHIPPED | OUTPATIENT
Start: 2022-04-28 | End: 2022-09-18 | Stop reason: SDUPTHER

## 2022-04-28 RX ORDER — TAMSULOSIN HYDROCHLORIDE 0.4 MG/1
0.4 CAPSULE ORAL DAILY
Qty: 90 CAPSULE | Refills: 0 | Status: SHIPPED | OUTPATIENT
Start: 2022-04-28 | End: 2022-07-27

## 2022-04-28 RX ORDER — FUROSEMIDE 20 MG/1
TABLET ORAL
Qty: 180 TABLET | Refills: 0 | Status: SHIPPED | OUTPATIENT
Start: 2022-04-28

## 2022-04-28 ASSESSMENT — PATIENT HEALTH QUESTIONNAIRE - PHQ9
SUM OF ALL RESPONSES TO PHQ QUESTIONS 1-9: 0
1. LITTLE INTEREST OR PLEASURE IN DOING THINGS: 0
SUM OF ALL RESPONSES TO PHQ QUESTIONS 1-9: 0
SUM OF ALL RESPONSES TO PHQ QUESTIONS 1-9: 0
SUM OF ALL RESPONSES TO PHQ9 QUESTIONS 1 & 2: 0
SUM OF ALL RESPONSES TO PHQ QUESTIONS 1-9: 0
2. FEELING DOWN, DEPRESSED OR HOPELESS: 0

## 2022-04-28 NOTE — PROGRESS NOTES
4/28/2022    Brenton Borja    Chief Complaint   Patient presents with   Sury Spaulding Doctor    6 Month Follow-Up       HPI  History was obtained from pt. Matty Taylor is a 63 y.o. male with a PMHx of anxiety, CAD with aortic valve replacement, COPD, GERD, BPH, HTN, HLD who presents today to establish care. Patient says he is doing well, and the acute complaints. Says he is going to miss Dr. Zenaida Bunn. Anxiety - pt is doing well on the lexapro, less tense than he used to be  CAD with aortic valve replacement - carvedilol, Plavix, Asa, sees [facility] cardiology- had echo recently and was told by pierce he was \"good for a year\". COPD - pt takes albuterol about every morning. Quit smoking 5 years ago. Also takes montelukast.   GERD - protonix - well controlled, used to take ranitadine  BPH - pt is on tamsulosin, no fam hx of prostate cancer, pt does not follow with urology currently  HTN - carvedilol, lasix, pt does have swelling in his legs if he misses a dose  HLD - 20 mg lipitor  Acne rosacea - currently on chronic doxy, tolerates it well  ED - tadalafil, works for the patient and \"keeps his wife happy\"    Care gaps  Needs labs  Wants ct lung screening      1. History of tobacco use    2. Encounter to establish care with new doctor    3. Pure hypercholesterolemia    4. Essential hypertension    5. Coronary arteriosclerosis    6. Acne rosacea    7. MICKEY (generalized anxiety disorder)    8. Pulmonary emphysema, unspecified emphysema type ([address]    9. Gastroesophageal reflux disease, unspecified whether esophagitis present    10. Benign prostatic hyperplasia, unspecified whether lower urinary tract symptoms present    11. Impotence    12.  Personal history of tobacco use             REVIEW OF SYMPTOMS    Review of Systems    PAST MEDICAL HISTORY  Past Medical History:   Diagnosis Date    Aortic valve disease     Dr. Taye Ramsey Ascending aortic aneurysm ([facility] 1/20/2020    Surgically repaired 1/2020    BPH (benign prostatic hyperplasia) 2019    COPD [facility]ch[facility] obstructive pulmonary disease) ([facility])     Hx: smoking - no pulmonologist as of 20    Coronary arteriosclerosis 2019    Dr. Albert Elmore GERD (gastroesophageal reflux disease) 2019    Takes Zantac    Impotence 2019    Skin abnormality     on nose - takes Doxycycline    Wears dentures     top & bottom       FAMILY HISTORY  Family History   Problem Relation Age of Onset    Stroke Mother     Hypertension Mother     Coronary Art Dis Other 64        uncle       SOCIAL HISTORY  Social History     Socioeconomic History    Marital status:      Spouse name: Not on file    Number of children: Not on file    Years of education: Not on file    Highest education level: Not on file   Occupational History    Not on file   Tobacco Use    Smoking status: Former Smoker     Packs/day: 2.50     Years: 41.00     Pack years: 102.50     Types: Cigarettes     Start date:      Quit date: 2017     Years since quittin.3    Smokeless tobacco: Never Used   Vaping Use    Vaping Use: Never used   Substance and Sexual Activity    Alcohol use: Yes     Alcohol/week: 3.0 standard drinks     Types: 3 Shots of liquor per week     Comment: occaisional    Drug use: No    Sexual activity: Not on file   Other Topics Concern    Not on file   Social History Narrative    Not on file     Social Determinants of Health     Financial Resource Strain: Low Risk     Difficulty of Paying Living Expenses: Not hard at all   Food Insecurity: No Food Insecurity    Worried About Running Out of Food in the Last Year: Never true    Aviva of Food in the Last Year: Never true   Transportation Needs: No Transportation Needs    Lack of Transportation (Medical): No    Lack of Transportation (Non-Medical):  No   Physical Activity:     Days of Exercise per Week: Not on file    Minutes of Exercise per Session: Not on file   Stress:     Feeling of Stress : Not on file Social Connections:     Frequency of Communication with Friends and Family: Not on file    Frequency of Social Gatherings with Friends and Family: Not on file    Attends Scientology Services: Not on file    Active Member of Clubs or Organizations: Not on file    Attends Club or Organization Meetings: Not on file    Marital Status: Not on file   Intimate Partner Violence:     Fear of Current or Ex-Partner: Not on file    Emotionally Abused: Not on file    Physically Abused: Not on file    Sexually Abused: Not on file   Housing Stability:     Unable to Pay for Housing in the Last Year: Not on file    Number of [place] in the Last Year: Not on file    Unstable Housing in the Last Year: Not on file        SURGICAL HISTORY  Past Surgical History:   Procedure Laterality Date    AORTIC VALVE REPLACEMENT N/A 01/20/2020    Dr. Tomasa Guillen N/A 1/20/2020    [address], INTRAOPERATIVE RAY, INDUCED HYPOTHERMIA performed by Daryle Buoy, MD at [address] [facility]  2012    COLONOSCOPY  2012    Normal exam per pt - Dr. Anna Pinon?     COLONOSCOPY  2017    CORONARY ANGIOPLASTY WITH STENT PLACEMENT      2010 - Dr. Dewayne Augustin;   2017 - (RCA) by Dr. Peg Lion Right 1990's    Inguinal    LUMBAR DISCECTOMY  03/2004    L4- L5    OTHER SURGICAL HISTORY      genital warts excised    THORACIC AORTIC ANEURYSM REPAIR N/A 1/20/2020    THORACIC ASCENDING AORTIC ANEURYSM REPAIR performed by Daryle Buoy, MD at [address] [place] [address]  Current Outpatient Medications   Medication Sig Dispense Refill    loratadine (CLARITIN) 10 MG tablet TAKE 1 TABLET BY MOUTH EVERY DAY      azelastine (ASTELIN) 0.1 % nasal spray APPLY 2 SPRAYS BY NASAL ROUTE 2 TIMES A DAY FOR 30 DAYS NASAL 30 DAY(S)      aspirin 81 MG chewable tablet Take 1 tablet by mouth daily 90 tablet 1    atorvastatin (LIPITOR) 20 MG tablet Take 1 tablet by mouth nightly 90 tablet 1    carvedilol (COREG) 6.25 MG tablet Take 1 tablet by mouth 2 times daily (with meals) 180 tablet 0    clopidogrel (PLAVIX) 75 MG tablet Take 1 tablet by mouth daily 90 tablet 0    doxycycline hyclate (VIBRAMYCIN) 100 MG capsule TAKE 1 CAPSULE BY MOUTH ONCE DAILY 90 capsule 1    escitalopram (LEXAPRO) 10 MG tablet Take 1 tablet by mouth daily 90 tablet 0    furosemide (LASIX) 20 MG tablet TAKE 1 TABLET BY MOUTH TWICE A  tablet 0    montelukast (SINGULAIR) 10 MG tablet TAKE 1 TABLET BY MOUTH EVERY DAY AT NIGHT 90 tablet 1    pantoprazole (PROTONIX) 40 MG tablet Take 1 tablet by mouth daily 90 tablet 1    tadalafil (CIALIS) 5 MG tablet Take 1 tablet by mouth daily 90 tablet 1    tamsulosin (FLOMAX) 0.4 MG capsule Take 1 capsule by mouth daily 90 capsule 0    albuterol sulfate  (90 Base) MCG/ACT inhaler INHALE 2 PUFFS INTO THE LUNGS 4 TIMES DAILY AS NEEDED FOR WHEEZING. 1 each 0    ipratropium (ATROVENT) 0.06 % nasal spray USE 2 SPRAYS BY NASAL ROUTE 3 TIMES DAILY 1 Bottle 2     No current facility-administered medications for this visit. ALLERGIES  Allergies   Allergen Reactions    Wellbutrin [Bupropion] Itching    Chantix [Varenicline Tartrate] Other (See Comments)     depression    Lisinopril Other (See Comments)     cough    Testosterone Other (See Comments)     Speeding - patient unsure of this allergy (1/14/20)       PHYSICAL EXAM    /78 (Site: Left Upper Arm, Position: Sitting, Cuff Size: Medium Adult)   Pulse 63   Ht 5' 11\" (1.803 m)   Wt 289 lb 9.6 oz (131.4 kg)   SpO2 96%   BMI 40.39 kg/m²     Physical Exam  Constitutional:       Appearance: Normal appearance. He is obese. HENT:      Head: Normocephalic and atraumatic. Right Ear: Tympanic membrane and external ear normal.      Left Ear: Tympanic membrane and external ear normal.      Nose: No rhinorrhea.       Mouth/Throat:      Mouth: Mucous membranes are moist.      Pharynx: Oropharynx is clear. No oropharyngeal exudate or posterior oropharyngeal erythema. Eyes:      General: No scleral icterus. Extraocular Movements: Extraocular movements intact. Conjunctiva/sclera: Conjunctivae normal.      Pupils: Pupils are equal, round, and reactive to light. Cardiovascular:      Rate and Rhythm: Normal rate and regular rhythm. Pulses: Normal pulses. Heart sounds: Murmur heard. No friction rub. No gallop. Pulmonary:      Effort: Pulmonary effort is normal.      Breath sounds: Normal breath sounds. No wheezing, rhonchi or rales. Abdominal:      General: Bowel sounds are normal. There is no distension. Palpations: Abdomen is soft. There is no mass. Tenderness: There is no abdominal tenderness. There is no right CVA tenderness, left CVA tenderness, guarding or rebound. Musculoskeletal:         General: No deformity. Normal range of motion. Cervical back: Normal range of motion and neck supple. No rigidity. No muscular tenderness. Right lower leg: Edema present. Left lower leg: Edema present. Skin:     General: Skin is warm and dry. Capillary Refill: Capillary refill takes less than 2 seconds. Findings: No bruising, erythema or rash. Neurological:      General: No focal deficit present. Mental Status: He is alert and oriented to person, place, and time. Coordination: Coordination normal.      Gait: Gait normal.   Psychiatric:         Mood and Affect: Mood normal.         Behavior: Behavior normal.         ASSESSMENT & PLAN    1. Encounter to establish care with new doctor  Care gaps  Needs labs  Wants ct lung screening    2. Pure hypercholesterolemia  Want to monitor  - atorvastatin (LIPITOR) 20 MG tablet; Take 1 tablet by mouth nightly  Dispense: 90 tablet; Refill: 1    3. Essential hypertension  Well-controlled  - carvedilol (COREG) 6.25 MG tablet;  Take 1 tablet by mouth 2 times daily (with meals)  Dispense: 180 tablet; Refill: 0  - furosemide (LASIX) 20 MG tablet; TAKE 1 TABLET BY MOUTH TWICE A DAY  Dispense: 180 tablet; Refill: 0    4. Coronary arteriosclerosis  Asymptomatic, following with cardiology  - clopidogrel (PLAVIX) 75 MG tablet; Take 1 tablet by mouth daily  Dispense: 90 tablet; Refill: 0    5. Acne rosacea  Continue current regimen, tolerating well  - doxycycline hyclate (VIBRAMYCIN) 100 MG capsule; TAKE 1 CAPSULE BY MOUTH ONCE DAILY  Dispense: 90 capsule; Refill: 1    6. MICKEY (generalized anxiety disorder)  Much improved with medication, continue regimen  - escitalopram (LEXAPRO) 10 MG tablet; Take 1 tablet by mouth daily  Dispense: 90 tablet; Refill: 0    7. History of tobacco use  Patient quit 5 years ago  - CT LUNG SCREENING; Future  - OK VISIT TO DISCUSS LUNG CA SCREEN W LDCT    8. Pulmonary emphysema, unspecified emphysema type ([address]  Has some shortness of breath    9. Gastroesophageal reflux disease, unspecified whether esophagitis present  Well-controlled  - pantoprazole (PROTONIX) 40 MG tablet; Take 1 tablet by mouth daily  Dispense: 90 tablet; Refill: 1    10. Benign prostatic hyperplasia, unspecified whether lower urinary tract symptoms present  Patient is urinating a lot with the Lasix but is tolerable  - tamsulosin (FLOMAX) 0.4 MG capsule; Take 1 capsule by mouth daily  Dispense: 90 capsule; Refill: 0    11. Impotence  Well-controlled with tadalafil  - tadalafil (CIALIS) 5 MG tablet; Take 1 tablet by mouth daily  Dispense: 90 tablet; Refill: 1    12. Personal history of tobacco use    - OK VISIT TO DISCUSS LUNG CA SCREEN W LDCT      Return in about 6 months (around 10/28/2022).          Electronically signed by Randy Rai on 4/28/2022        Comment: Please note this report has been produced using speech recognition software and may contain errors related to that system including errors in grammar, punctuation, and spelling, as well as words and phrases that may be inappropriate. If there are any questions or concerns please feel free to contact the dictating provider for clarification. Low Dose CT (LDCT) Lung Screening criteria met:     Age 50-77(Medicare) or 50-80 (USPST)   Pack year smoking >20   Still smoking or less than 15 year since quit   No sign or symptoms of lung cancer   > 11 months since last LDCT     Risks and benefits of lung cancer screening with LDCT scans discussed:    Significance of positive screen - False-positive LDCT results often occur. 95% of all positive results do not lead to a diagnosis of cancer. Usually further imaging can resolve most false-positive results; however, some patients may require invasive procedures. Over diagnosis risk - 10% to 12% of screen-detected lung cancer cases are over diagnosed--that is, the cancer would not have been detected in the patient's lifetime without the screening. Need for follow up screens annually to continue lung cancer screening effectiveness     Risks associated with radiation from annual LDCT- Radiation exposure is about the same as for a mammogram, which is about 1/3 of the annual background radiation exposure from everyday life. Starting screening at age 54 is not likely to increase cancer risk from radiation exposure. Patients with comorbidities resulting in life expectancy of < 10 years, or that would preclude treatment of an abnormality identified on CT, should not be screened due to lack of benefit.     To obtain maximal benefit from this screening, smoking cessation and long-term abstinence from smoking is critical

## 2022-05-06 ENCOUNTER — HOSPITAL ENCOUNTER (OUTPATIENT)
Dept: CT IMAGING | Age: 62
Discharge: HOME | End: 2022-05-06
Payer: COMMERCIAL

## 2022-05-06 DIAGNOSIS — Z87.891 HISTORY OF TOBACCO USE: ICD-10-CM

## 2022-05-06 PROCEDURE — 71271 CT THORAX LUNG CANCER SCR C-: CPT

## 2022-06-14 DIAGNOSIS — R05.9 COUGH, UNSPECIFIED: ICD-10-CM

## 2022-06-16 RX ORDER — LORATADINE 10 MG/1
TABLET ORAL
Qty: 90 TABLET | Refills: 1 | Status: SHIPPED | OUTPATIENT
Start: 2022-06-16

## 2022-07-20 ENCOUNTER — PATIENT MESSAGE (OUTPATIENT)
Dept: FAMILY MEDICINE CLINIC | Age: 62
End: 2022-07-20

## 2022-07-20 DIAGNOSIS — F41.1 GAD (GENERALIZED ANXIETY DISORDER): Primary | ICD-10-CM

## 2022-07-21 NOTE — TELEPHONE ENCOUNTER
From: Charlene Doyle  To: Jez Magana  Sent: 7/20/2022 10:33 AM EDT  Subject: Escitalopram 10mg    I have been taking this for sometime and I have been having issues with my breathing, ED and sweating. I read the side effects and it seems that this medicine may be the issue. Would it possible to try a different medicine?

## 2022-09-18 DIAGNOSIS — L71.9 ACNE ROSACEA: ICD-10-CM

## 2022-09-18 DIAGNOSIS — I25.10 CORONARY ARTERIOSCLEROSIS: ICD-10-CM

## 2022-09-20 RX ORDER — CLOPIDOGREL BISULFATE 75 MG/1
75 TABLET ORAL DAILY
Qty: 90 TABLET | Refills: 0 | Status: SHIPPED | OUTPATIENT
Start: 2022-09-20 | End: 2022-12-19

## 2022-09-20 RX ORDER — DOXYCYCLINE HYCLATE 100 MG/1
CAPSULE ORAL
Qty: 90 CAPSULE | Refills: 1 | OUTPATIENT
Start: 2022-09-20

## 2022-09-20 RX ORDER — METRONIDAZOLE 10 MG/G
GEL TOPICAL
Qty: 60 G | Refills: 1 | Status: SHIPPED | OUTPATIENT
Start: 2022-09-20

## 2022-11-23 ENCOUNTER — TELEPHONE (OUTPATIENT)
Dept: FAMILY MEDICINE CLINIC | Age: 62
End: 2022-11-23

## 2023-07-12 ENCOUNTER — APPOINTMENT (OUTPATIENT)
Dept: GENERAL RADIOLOGY | Age: 63
End: 2023-07-12
Payer: COMMERCIAL

## 2023-07-12 ENCOUNTER — HOSPITAL ENCOUNTER (EMERGENCY)
Age: 63
Discharge: HOME | End: 2023-07-12
Attending: STUDENT IN AN ORGANIZED HEALTH CARE EDUCATION/TRAINING PROGRAM
Payer: COMMERCIAL

## 2023-07-12 VITALS
TEMPERATURE: 98.2 F | RESPIRATION RATE: 18 BRPM | WEIGHT: 293 LBS | HEART RATE: 61 BPM | SYSTOLIC BLOOD PRESSURE: 120 MMHG | BODY MASS INDEX: 37.6 KG/M2 | DIASTOLIC BLOOD PRESSURE: 75 MMHG | OXYGEN SATURATION: 96 % | HEIGHT: 74 IN

## 2023-07-12 DIAGNOSIS — R60.9 PERIPHERAL EDEMA: Primary | ICD-10-CM

## 2023-07-12 PROCEDURE — 73630 X-RAY EXAM OF FOOT: CPT

## 2023-07-12 PROCEDURE — 6370000000 HC RX 637 (ALT 250 FOR IP): Performed by: EMERGENCY MEDICINE

## 2023-07-12 PROCEDURE — 6370000000 HC RX 637 (ALT 250 FOR IP): Performed by: STUDENT IN AN ORGANIZED HEALTH CARE EDUCATION/TRAINING PROGRAM

## 2023-07-12 PROCEDURE — 99283 EMERGENCY DEPT VISIT LOW MDM: CPT

## 2023-07-12 RX ORDER — IBUPROFEN 400 MG/1
800 TABLET ORAL ONCE
Status: COMPLETED | OUTPATIENT
Start: 2023-07-12 | End: 2023-07-12

## 2023-07-12 RX ORDER — CYCLOBENZAPRINE HCL 10 MG
10 TABLET ORAL 3 TIMES DAILY PRN
Qty: 21 TABLET | Refills: 0 | Status: SHIPPED | OUTPATIENT
Start: 2023-07-12 | End: 2023-07-22

## 2023-07-12 RX ORDER — HYDROCODONE BITARTRATE AND ACETAMINOPHEN 5; 325 MG/1; MG/1
1 TABLET ORAL ONCE
Status: COMPLETED | OUTPATIENT
Start: 2023-07-12 | End: 2023-07-12

## 2023-07-12 RX ORDER — FUROSEMIDE 20 MG/1
20 TABLET ORAL ONCE
Status: COMPLETED | OUTPATIENT
Start: 2023-07-12 | End: 2023-07-12

## 2023-07-12 RX ADMIN — HYDROCODONE BITARTRATE AND ACETAMINOPHEN 1 TABLET: 5; 325 TABLET ORAL at 20:34

## 2023-07-12 RX ADMIN — IBUPROFEN 800 MG: 400 TABLET, FILM COATED ORAL at 18:27

## 2023-07-12 RX ADMIN — FUROSEMIDE 20 MG: 20 TABLET ORAL at 20:34

## 2023-07-12 ASSESSMENT — PAIN DESCRIPTION - ORIENTATION
ORIENTATION: RIGHT;LEFT

## 2023-07-12 ASSESSMENT — PAIN DESCRIPTION - ONSET: ONSET: ON-GOING

## 2023-07-12 ASSESSMENT — PAIN DESCRIPTION - LOCATION
LOCATION: FOOT
LOCATION: LEG

## 2023-07-12 ASSESSMENT — PAIN DESCRIPTION - DESCRIPTORS
DESCRIPTORS: ACHING
DESCRIPTORS: ACHING
DESCRIPTORS: BURNING
DESCRIPTORS: ACHING

## 2023-07-12 ASSESSMENT — PAIN SCALES - GENERAL
PAINLEVEL_OUTOF10: 9

## 2023-07-12 ASSESSMENT — PAIN - FUNCTIONAL ASSESSMENT: PAIN_FUNCTIONAL_ASSESSMENT: 0-10

## 2023-07-12 ASSESSMENT — PAIN DESCRIPTION - PAIN TYPE: TYPE: ACUTE PAIN

## 2023-07-12 ASSESSMENT — PAIN DESCRIPTION - FREQUENCY: FREQUENCY: CONTINUOUS

## 2023-07-13 NOTE — ED PROVIDER NOTES
Emergency Department Encounter    Patient: Emmanuelle Myles  MRN: [id number]  : 1960  Date of Evaluation: 2023  ED Provider:  Sary Wheat DO    Triage Chief Complaint:   Foot Pain (Reports bilat feet pain and swelling for around a moth. Reports out pt blood work 2 days ago)    [place]:  Emmanuelle Myles is a 63 y.o. male with a past medical history of CAD, COPD, presents to the ED with a history of bilateral feet pain for the past couple of days. Patient also complains of swelling of the feet. Patient states that he is on his feet most of the time. Patient was recently in an outside health facility where he had a negative work-up regarding CBC, and electrolytes as well. Liver enzymes were also unremarkable and that evaluation. No history of shortness of breath, fever, abdominal pain, or worsening fatigue. No history of chest pain or palpitations.       ROS - see HPI, below listed is current ROS at time of my eval:  Review of Systems    ROS is an in history; otherwise unremarkable    Past Medical History:   Diagnosis Date    Aortic valve disease     Dr. Dalia Nicolas    Ascending aortic aneurysm ([address]) 2020    Surgically repaired 2020    BPH (benign prostatic hyperplasia) 2019    COPD (chronic obstructive pulmonary disease) ([address])     Hx: smoking - no pulmonologist as of 20    Coronary arteriosclerosis 2019    Dr. Dalia Nicolas    GERD (gastroesophageal reflux disease) 2019    Takes Zantac    Impotence 2019    Skin abnormality     on nose - takes Doxycycline    Wears dentures     top & bottom     Past Surgical History:   Procedure Laterality Date    AORTIC VALVE REPLACEMENT N/A 2020    Dr. Wayne Lunsford N/A 2020    MINIMALLY INVASIVE AORTIC VALVE REPLACEMENT, INTRAOPERATIVE RAY, INDUCED HYPOTHERMIA performed by Dana Heck MD at [facility]      CHOLECYSTECTOMY      COLONOSCOPY  2012    Normal exam per pt -

## 2023-07-13 NOTE — ED NOTES
Patient prepared for and ready to be discharged. Patient discharged at this time in no acute distress after verbalizing understanding of discharge instructions. Patient left after receiving After Visit Summary instructions.        John Hayward RN  07/12/23 [id number]

## 2023-07-13 NOTE — DISCHARGE INSTRUCTIONS
Double your nighttime Lasix dose to now take 40 mg tonight and stated 20 for the next 4 days. Also elevate your legs at night above the level of your heart.

## 2023-07-13 NOTE — ED PROVIDER NOTES
CHIEF COMPLAINT    Chief Complaint   Patient presents with    Foot Pain     Reports bilat feet pain and swelling for around a moth. Reports out pt blood work 2 days ago     NILDA Myles is a 63 y.o. male with history of GERD, coronary artery disease, ascending aortic replacement and aortic valve replacement who presents to the ED with complaints of pain and swelling to bilateral lower extremities. Patient states he has been experiencing some increased swelling and pain to bilateral lower extremities over the last 2 weeks or so. He has experienced Imler symptoms in the past with improvement with Lasix. He states that he takes 20 mg of Lasix twice daily at home at baseline but has not increased this. He had laboratory studies obtained on 07/06 related to this complaint with mildly elevated BNP. Renal function was reassuring. His pain is described as a pressure and burning/pleuritic type of pain. The pain is rated as moderate to severe in severity. Nothing seems to make it better. Palpation makes it worse. Denies fevers, chills, chest pain, shortness of breath, dizziness, lightheadedness, nausea, vomiting. REVIEW OF SYSTEMS  Constitutional: No fever, chills   Eye: No visual changes  HENT: No earache or sore throat. Resp: No SOB or productive cough. Cardio: No chest pain or palpitations. Complains of swelling to bilateral lower extremities  GI: No abdominal pain, nausea, vomiting, constipation or diarrhea. No melena. : No dysuria, urgency or frequency. Endocrine: No heat intolerance, no cold intolerance, no polydipsia   Lymphatics: No adenopathy  Musculoskeletal: No new muscle aches  Neuro: No headaches. Psych: No homicidal or suicidal thoughts  Skin: No rash, No itching. ?  ?   PAST MEDICAL HISTORY  Past Medical History:   Diagnosis Date    Aortic valve disease     Dr. Dalia Nicolas    Ascending aortic aneurysm ([facility]) 1/20/2020    Surgically repaired 1/2020    BPH (benign prostatic hyperplasia)

## 2024-09-23 ENCOUNTER — HOSPITAL ENCOUNTER (EMERGENCY)
Age: 64
Discharge: HOME | End: 2024-09-23
Attending: EMERGENCY MEDICINE
Payer: COMMERCIAL

## 2024-09-23 ENCOUNTER — APPOINTMENT (OUTPATIENT)
Dept: GENERAL RADIOLOGY | Age: 64
End: 2024-09-23
Payer: COMMERCIAL

## 2024-09-23 VITALS
RESPIRATION RATE: 18 BRPM | BODY MASS INDEX: 36.59 KG/M2 | DIASTOLIC BLOOD PRESSURE: 70 MMHG | WEIGHT: 285 LBS | TEMPERATURE: 97.9 F | SYSTOLIC BLOOD PRESSURE: 144 MMHG | OXYGEN SATURATION: 97 % | HEART RATE: 60 BPM

## 2024-09-23 DIAGNOSIS — M71.50 TRAUMATIC BURSITIS: Primary | ICD-10-CM

## 2024-09-23 PROCEDURE — 99283 EMERGENCY DEPT VISIT LOW MDM: CPT

## 2024-09-23 PROCEDURE — 73562 X-RAY EXAM OF KNEE 3: CPT

## 2024-10-09 ENCOUNTER — APPOINTMENT (OUTPATIENT)
Dept: GENERAL RADIOLOGY | Age: 64
End: 2024-10-09
Payer: COMMERCIAL

## 2024-10-09 ENCOUNTER — HOSPITAL ENCOUNTER (EMERGENCY)
Age: 64
Discharge: HOME | End: 2024-10-09
Attending: EMERGENCY MEDICINE
Payer: COMMERCIAL

## 2024-10-09 VITALS
SYSTOLIC BLOOD PRESSURE: 132 MMHG | HEART RATE: 71 BPM | BODY MASS INDEX: 36.78 KG/M2 | OXYGEN SATURATION: 96 % | DIASTOLIC BLOOD PRESSURE: 81 MMHG | TEMPERATURE: 98.5 F | WEIGHT: 286.5 LBS

## 2024-10-09 DIAGNOSIS — N30.00 ACUTE CYSTITIS WITHOUT HEMATURIA: ICD-10-CM

## 2024-10-09 DIAGNOSIS — J06.9 ACUTE UPPER RESPIRATORY INFECTION: Primary | ICD-10-CM

## 2024-10-09 LAB
BILIRUB UR QL STRIP: ABNORMAL
CHARACTER UR: ABNORMAL
CLARITY UR: CLEAR
COLOR UR: YELLOW
EPI CELLS #/AREA URNS HPF: ABNORMAL /HPF
GLUCOSE BLD-MCNC: 81 MG/DL (ref 74–99)
GLUCOSE UR STRIP-MCNC: NEGATIVE MG/DL
HGB UR QL STRIP.AUTO: ABNORMAL
KETONES UR STRIP-MCNC: ABNORMAL MG/DL
LEUKOCYTE ESTERASE UR QL STRIP: ABNORMAL
MUCOUS THREADS URNS QL MICRO: PRESENT
NITRITE UR QL STRIP: POSITIVE
PH UR STRIP: 5.5 [PH] (ref 5–8)
PROT UR STRIP-MCNC: 100 MG/DL
RBC #/AREA URNS HPF: ABNORMAL /HPF
SARS-COV-2 RDRP RESP QL NAA+PROBE: NOT DETECTED
SP GR UR STRIP: >1.03 (ref 1–1.03)
SPECIMEN DESCRIPTION: NORMAL
UROBILINOGEN UR STRIP-ACNC: 1 EU/DL (ref 0–1)
WBC #/AREA URNS HPF: ABNORMAL /HPF

## 2024-10-09 PROCEDURE — 87086 URINE CULTURE/COLONY COUNT: CPT

## 2024-10-09 PROCEDURE — 87186 SC STD MICRODIL/AGAR DIL: CPT

## 2024-10-09 PROCEDURE — 81001 URINALYSIS AUTO W/SCOPE: CPT

## 2024-10-09 PROCEDURE — 99284 EMERGENCY DEPT VISIT MOD MDM: CPT

## 2024-10-09 PROCEDURE — 87635 SARS-COV-2 COVID-19 AMP PRB: CPT

## 2024-10-09 PROCEDURE — 71046 X-RAY EXAM CHEST 2 VIEWS: CPT

## 2024-10-09 PROCEDURE — 87077 CULTURE AEROBIC IDENTIFY: CPT

## 2024-10-09 PROCEDURE — 82962 GLUCOSE BLOOD TEST: CPT

## 2024-10-09 PROCEDURE — 6370000000 HC RX 637 (ALT 250 FOR IP): Performed by: EMERGENCY MEDICINE

## 2024-10-09 RX ORDER — CEPHALEXIN 500 MG/1
500 CAPSULE ORAL 4 TIMES DAILY
Qty: 28 CAPSULE | Refills: 0 | Status: SHIPPED | OUTPATIENT
Start: 2024-10-09 | End: 2024-10-16

## 2024-10-09 RX ADMIN — CEPHALEXIN 500 MG: 250 CAPSULE ORAL at 18:51

## 2024-10-09 ASSESSMENT — PAIN SCALES - GENERAL: PAINLEVEL_OUTOF10: 8

## 2024-10-09 ASSESSMENT — PAIN - FUNCTIONAL ASSESSMENT
PAIN_FUNCTIONAL_ASSESSMENT: ACTIVITIES ARE NOT PREVENTED
PAIN_FUNCTIONAL_ASSESSMENT: 0-10

## 2024-10-09 ASSESSMENT — PAIN DESCRIPTION - DESCRIPTORS: DESCRIPTORS: ACHING

## 2024-10-09 ASSESSMENT — PAIN DESCRIPTION - LOCATION: LOCATION: GENERALIZED

## 2024-10-09 NOTE — ED PROVIDER NOTES
Emergency Department Encounter    Patient: Herminio Peralta  MRN: [id number]  : 1960  Date of Evaluation: 10/9/2024  ED Provider:  Akiko Holm MD    Triage Chief Complaint:   Cough and Generalized Body Aches    [place]:  Herminio Peralta is a 64 y.o. male that presents with complaint of cough, generalized bodyaches, urinary frequency and dysuria.  He has been having cough and bodyaches and generally feeling unwell for the last couple of days, has congestion.  No diarrhea.  Feels like he has the urge show to the bathroom every 5 minutes but then dribbling and having dysuria.  He does have type 2 diabetes.  Not checked his sugar.  Has not eaten well for the last couple of days because he feels so ill.  Cough is nonproductive.  Does have a history of COPD.  Now feels like he is having some increased difficulty breathing.  Brought in by family member. They did two covid tests at home that were negative.     ROS - see HPI, below listed is current ROS at time of my eval:  10 systems reviewed and negative except as above.     Past Medical History:   Diagnosis Date    Aortic valve disease     Dr. Brunner    Ascending aortic aneurysm (HCC) 2020    Surgically repaired 2020    BPH (benign prostatic hyperplasia) 2019    COPD (chronic obstructive pulmonary disease) ([facility])     Hx: smoking - no pulmonologist as of 20    Coronary arteriosclerosis 2019    Dr. Brunner    GERD (gastroesophageal reflux disease) 2019    Takes Zantac    Impotence 2019    Skin abnormality     on nose - takes Doxycycline    Wears dentures     top & bottom     Past Surgical History:   Procedure Laterality Date    AORTIC VALVE REPLACEMENT N/A 2020    Dr. Downing     AORTIC VALVE REPLACEMENT N/A 2020    MINIMALLY INVASIVE AORTIC VALVE REPLACEMENT, INTRAOPERATIVE RAY, INDUCED HYPOTHERMIA performed by Rosi Downing MD at [facility] OR    APPENDECTOMY      CHOLECYSTECTOMY      COLONOSCOPY      Normal exam

## 2024-10-09 NOTE — ED NOTES
Pt ambulatory into ed c/o hot and cold chils with general body aches x 2 days. Cough and congestion.

## 2024-10-12 LAB
MICROORGANISM SPEC CULT: ABNORMAL
MICROORGANISM SPEC CULT: ABNORMAL
SPECIMEN DESCRIPTION: ABNORMAL

## 2024-10-12 NOTE — PROGRESS NOTES
Pharmacy Note  ED Culture Follow-up    Herminio Peralta is a 64 y.o. male.     Allergies: Wellbutrin [bupropion], Chantix [varenicline tartrate], Lisinopril, and Testosterone     Current antimicrobials:   Reviewed patient's urine culture - culture positive for 75K CFU E Coli.  Patient was discharged on Keflex, and culture is sensitive to prescribed medication.  Antibiotic prescribed at discharge is appropriate - no changes made to antibiotic regimen.      Please call with any questions. Ext. [phone]    Joseline Velasco RPH, PharmD 10:50 AM 10/12/2024

## 2024-12-13 ENCOUNTER — AMBULATORY SURGICAL CENTER (OUTPATIENT)
Dept: URBAN - METROPOLITAN AREA SURGERY 5 | Facility: SURGERY | Age: 64
End: 2024-12-13
Payer: COMMERCIAL

## 2024-12-13 ENCOUNTER — OFFICE (OUTPATIENT)
Dept: URBAN - METROPOLITAN AREA PATHOLOGY 1 | Facility: PATHOLOGY | Age: 64
End: 2024-12-13
Payer: COMMERCIAL

## 2024-12-13 VITALS
OXYGEN SATURATION: 99 % | DIASTOLIC BLOOD PRESSURE: 68 MMHG | DIASTOLIC BLOOD PRESSURE: 75 MMHG | RESPIRATION RATE: 16 BRPM | SYSTOLIC BLOOD PRESSURE: 115 MMHG | WEIGHT: 283 LBS | RESPIRATION RATE: 19 BRPM | OXYGEN SATURATION: 97 % | HEART RATE: 58 BPM | HEART RATE: 55 BPM | SYSTOLIC BLOOD PRESSURE: 151 MMHG | DIASTOLIC BLOOD PRESSURE: 65 MMHG | RESPIRATION RATE: 14 BRPM | DIASTOLIC BLOOD PRESSURE: 62 MMHG | DIASTOLIC BLOOD PRESSURE: 56 MMHG | DIASTOLIC BLOOD PRESSURE: 81 MMHG | RESPIRATION RATE: 18 BRPM | SYSTOLIC BLOOD PRESSURE: 114 MMHG | HEART RATE: 59 BPM | OXYGEN SATURATION: 98 % | HEIGHT: 74 IN | RESPIRATION RATE: 18 BRPM | DIASTOLIC BLOOD PRESSURE: 56 MMHG | DIASTOLIC BLOOD PRESSURE: 72 MMHG | DIASTOLIC BLOOD PRESSURE: 81 MMHG | HEART RATE: 53 BPM | HEART RATE: 60 BPM | SYSTOLIC BLOOD PRESSURE: 133 MMHG | DIASTOLIC BLOOD PRESSURE: 68 MMHG | HEART RATE: 58 BPM | RESPIRATION RATE: 15 BRPM | SYSTOLIC BLOOD PRESSURE: 151 MMHG | DIASTOLIC BLOOD PRESSURE: 72 MMHG | RESPIRATION RATE: 19 BRPM | SYSTOLIC BLOOD PRESSURE: 114 MMHG | RESPIRATION RATE: 14 BRPM | SYSTOLIC BLOOD PRESSURE: 113 MMHG | RESPIRATION RATE: 16 BRPM | RESPIRATION RATE: 15 BRPM | DIASTOLIC BLOOD PRESSURE: 75 MMHG | HEART RATE: 59 BPM | OXYGEN SATURATION: 97 % | TEMPERATURE: 97.7 F | SYSTOLIC BLOOD PRESSURE: 113 MMHG | HEART RATE: 54 BPM | HEART RATE: 54 BPM | SYSTOLIC BLOOD PRESSURE: 131 MMHG | HEIGHT: 74 IN | OXYGEN SATURATION: 99 % | TEMPERATURE: 97.7 F | HEART RATE: 60 BPM | DIASTOLIC BLOOD PRESSURE: 65 MMHG | HEART RATE: 53 BPM | DIASTOLIC BLOOD PRESSURE: 62 MMHG | SYSTOLIC BLOOD PRESSURE: 133 MMHG | WEIGHT: 283 LBS | SYSTOLIC BLOOD PRESSURE: 131 MMHG | HEART RATE: 55 BPM | OXYGEN SATURATION: 98 % | SYSTOLIC BLOOD PRESSURE: 115 MMHG

## 2024-12-13 DIAGNOSIS — K57.30 DIVERTICULOSIS OF LARGE INTESTINE WITHOUT PERFORATION OR ABS: ICD-10-CM

## 2024-12-13 DIAGNOSIS — K62.1 RECTAL POLYP: ICD-10-CM

## 2024-12-13 DIAGNOSIS — D12.2 BENIGN NEOPLASM OF ASCENDING COLON: ICD-10-CM

## 2024-12-13 DIAGNOSIS — K63.5 POLYP OF COLON: ICD-10-CM

## 2024-12-13 DIAGNOSIS — Z12.11 ENCOUNTER FOR SCREENING FOR MALIGNANT NEOPLASM OF COLON: ICD-10-CM

## 2024-12-13 PROCEDURE — 45385 COLONOSCOPY W/LESION REMOVAL: CPT | Mod: 33 | Performed by: INTERNAL MEDICINE

## 2024-12-13 PROCEDURE — 88305 TISSUE EXAM BY PATHOLOGIST: CPT | Performed by: PATHOLOGY

## 2024-12-19 LAB
PDF: PDF REPORT: (no result)
PDF: PDF REPORT: (no result)

## 2025-02-09 ENCOUNTER — HOSPITAL ENCOUNTER (OUTPATIENT)
Age: 65
Setting detail: OBSERVATION
Discharge: HOME | End: 2025-02-10
Attending: EMERGENCY MEDICINE | Admitting: STUDENT IN AN ORGANIZED HEALTH CARE EDUCATION/TRAINING PROGRAM
Payer: COMMERCIAL

## 2025-02-09 ENCOUNTER — APPOINTMENT (OUTPATIENT)
Dept: CT IMAGING | Age: 65
End: 2025-02-09
Payer: COMMERCIAL

## 2025-02-09 DIAGNOSIS — R29.898 LEFT LEG WEAKNESS: ICD-10-CM

## 2025-02-09 DIAGNOSIS — R42 DIZZINESS: Primary | ICD-10-CM

## 2025-02-09 DIAGNOSIS — H55.00 NYSTAGMUS: ICD-10-CM

## 2025-02-09 PROBLEM — R29.90 STROKE-LIKE SYMPTOMS: Status: ACTIVE | Noted: 2025-02-09

## 2025-02-09 LAB
ALBUMIN SERPL-MCNC: 3.9 G/DL (ref 3.4–5)
ALBUMIN/GLOB SERPL: 1.4 {RATIO} (ref 1.1–2.2)
ALP SERPL-CCNC: 87 U/L (ref 40–129)
ALT SERPL-CCNC: 20 U/L (ref 10–40)
ANION GAP SERPL CALCULATED.3IONS-SCNC: 8 MMOL/L (ref 4–16)
AST SERPL-CCNC: 19 U/L (ref 15–37)
BASOPHILS # BLD: 0.04 K/UL
BASOPHILS NFR BLD: 1 % (ref 0–1)
BILIRUB SERPL-MCNC: 0.5 MG/DL (ref 0–1)
BUN SERPL-MCNC: 20 MG/DL (ref 6–23)
CALCIUM SERPL-MCNC: 8.8 MG/DL (ref 8.3–10.6)
CHLORIDE SERPL-SCNC: 105 MMOL/L (ref 99–110)
CO2 SERPL-SCNC: 28 MMOL/L (ref 21–32)
CREAT SERPL-MCNC: 1 MG/DL (ref 0.9–1.3)
EKG ATRIAL RATE: 48 BPM
EKG DIAGNOSIS: NORMAL
EKG P AXIS: -9 DEGREES
EKG P-R INTERVAL: 152 MS
EKG Q-T INTERVAL: 454 MS
EKG QRS DURATION: 90 MS
EKG QTC CALCULATION (BAZETT): 405 MS
EKG R AXIS: -21 DEGREES
EKG T AXIS: 95 DEGREES
EKG VENTRICULAR RATE: 48 BPM
EOSINOPHIL # BLD: 0.29 K/UL
EOSINOPHILS RELATIVE PERCENT: 5 % (ref 0–3)
ERYTHROCYTE [DISTWIDTH] IN BLOOD BY AUTOMATED COUNT: 13.3 % (ref 11.7–14.9)
GFR, ESTIMATED: 85 ML/MIN/1.73M2
GLUCOSE BLD-MCNC: 125 MG/DL
GLUCOSE BLD-MCNC: 125 MG/DL (ref 74–99)
GLUCOSE BLD-MCNC: 92 MG/DL (ref 74–99)
GLUCOSE SERPL-MCNC: 139 MG/DL (ref 74–99)
HCT VFR BLD AUTO: 42.8 % (ref 42–52)
HGB BLD-MCNC: 13.9 G/DL (ref 13.5–18)
IMM GRANULOCYTES # BLD AUTO: 0.08 K/UL
IMM GRANULOCYTES NFR BLD: 1 %
INR PPP: 0.9
LYMPHOCYTES NFR BLD: 2.06 K/UL
LYMPHOCYTES RELATIVE PERCENT: 32 % (ref 24–44)
MCH RBC QN AUTO: 30.2 PG (ref 27–31)
MCHC RBC AUTO-ENTMCNC: 32.5 G/DL (ref 32–36)
MCV RBC AUTO: 93 FL (ref 78–100)
MONOCYTES NFR BLD: 0.46 K/UL
MONOCYTES NFR BLD: 7 % (ref 0–4)
NEUTROPHILS NFR BLD: 54 % (ref 36–66)
NEUTS SEG NFR BLD: 3.46 K/UL
PLATELET # BLD AUTO: 137 K/UL (ref 140–440)
PLATELET CONFIRMATION: NORMAL
PLATELET ESTIMATE: NORMAL
PMV BLD AUTO: 10.9 FL (ref 7.5–11.1)
POTASSIUM SERPL-SCNC: 4.1 MMOL/L (ref 3.5–5.1)
PROT SERPL-MCNC: 6.7 G/DL (ref 6.4–8.2)
PROTHROMBIN TIME: 12.9 SEC (ref 11.7–14.5)
RBC # BLD AUTO: 4.6 M/UL (ref 4.6–6.2)
RBC # BLD: NORMAL 10*6/UL
SODIUM SERPL-SCNC: 141 MMOL/L (ref 135–145)
TROPONIN I SERPL HS-MCNC: 16 NG/L (ref 0–21)
WBC # BLD: NORMAL 10*3/UL
WBC OTHER # BLD: 6.4 K/UL (ref 4–10.5)

## 2025-02-09 PROCEDURE — 99285 EMERGENCY DEPT VISIT HI MDM: CPT

## 2025-02-09 PROCEDURE — 93010 ELECTROCARDIOGRAM REPORT: CPT | Performed by: INTERNAL MEDICINE

## 2025-02-09 PROCEDURE — 82962 GLUCOSE BLOOD TEST: CPT

## 2025-02-09 PROCEDURE — 70498 CT ANGIOGRAPHY NECK: CPT

## 2025-02-09 PROCEDURE — 93005 ELECTROCARDIOGRAM TRACING: CPT | Performed by: EMERGENCY MEDICINE

## 2025-02-09 PROCEDURE — 6360000004 HC RX CONTRAST MEDICATION: Performed by: EMERGENCY MEDICINE

## 2025-02-09 PROCEDURE — 2500000003 HC RX 250 WO HCPCS: Performed by: STUDENT IN AN ORGANIZED HEALTH CARE EDUCATION/TRAINING PROGRAM

## 2025-02-09 PROCEDURE — 85610 PROTHROMBIN TIME: CPT

## 2025-02-09 PROCEDURE — G0378 HOSPITAL OBSERVATION PER HR: HCPCS

## 2025-02-09 PROCEDURE — 6360000002 HC RX W HCPCS: Performed by: EMERGENCY MEDICINE

## 2025-02-09 PROCEDURE — 80053 COMPREHEN METABOLIC PANEL: CPT

## 2025-02-09 PROCEDURE — 84484 ASSAY OF TROPONIN QUANT: CPT

## 2025-02-09 PROCEDURE — 96374 THER/PROPH/DIAG INJ IV PUSH: CPT

## 2025-02-09 PROCEDURE — 6370000000 HC RX 637 (ALT 250 FOR IP): Performed by: EMERGENCY MEDICINE

## 2025-02-09 PROCEDURE — 70450 CT HEAD/BRAIN W/O DYE: CPT

## 2025-02-09 PROCEDURE — 85025 COMPLETE CBC W/AUTO DIFF WBC: CPT

## 2025-02-09 PROCEDURE — 6370000000 HC RX 637 (ALT 250 FOR IP): Performed by: STUDENT IN AN ORGANIZED HEALTH CARE EDUCATION/TRAINING PROGRAM

## 2025-02-09 PROCEDURE — 96376 TX/PRO/DX INJ SAME DRUG ADON: CPT

## 2025-02-09 PROCEDURE — 82947 ASSAY GLUCOSE BLOOD QUANT: CPT

## 2025-02-09 RX ORDER — IOPAMIDOL 755 MG/ML
75 INJECTION, SOLUTION INTRAVASCULAR
Status: COMPLETED | OUTPATIENT
Start: 2025-02-09 | End: 2025-02-09

## 2025-02-09 RX ORDER — ONDANSETRON 4 MG/1
4 TABLET, ORALLY DISINTEGRATING ORAL EVERY 8 HOURS PRN
Status: DISCONTINUED | OUTPATIENT
Start: 2025-02-09 | End: 2025-02-10 | Stop reason: HOSPADM

## 2025-02-09 RX ORDER — MECLIZINE HYDROCHLORIDE 25 MG/1
12.5 TABLET ORAL 3 TIMES DAILY PRN
Status: DISCONTINUED | OUTPATIENT
Start: 2025-02-09 | End: 2025-02-10

## 2025-02-09 RX ORDER — ATORVASTATIN CALCIUM 40 MG/1
80 TABLET, FILM COATED ORAL NIGHTLY
Status: DISCONTINUED | OUTPATIENT
Start: 2025-02-09 | End: 2025-02-10 | Stop reason: HOSPADM

## 2025-02-09 RX ORDER — DEXTROSE MONOHYDRATE 100 MG/ML
INJECTION, SOLUTION INTRAVENOUS CONTINUOUS PRN
Status: DISCONTINUED | OUTPATIENT
Start: 2025-02-09 | End: 2025-02-10 | Stop reason: HOSPADM

## 2025-02-09 RX ORDER — ASPIRIN 81 MG/1
81 TABLET, CHEWABLE ORAL DAILY
Status: DISCONTINUED | OUTPATIENT
Start: 2025-02-10 | End: 2025-02-09

## 2025-02-09 RX ORDER — ASPIRIN 81 MG/1
81 TABLET, CHEWABLE ORAL DAILY
Status: DISCONTINUED | OUTPATIENT
Start: 2025-02-09 | End: 2025-02-10 | Stop reason: HOSPADM

## 2025-02-09 RX ORDER — LORAZEPAM 2 MG/ML
0.5 INJECTION INTRAMUSCULAR ONCE
Status: COMPLETED | OUTPATIENT
Start: 2025-02-09 | End: 2025-02-09

## 2025-02-09 RX ORDER — POLYETHYLENE GLYCOL 3350 17 G/17G
17 POWDER, FOR SOLUTION ORAL DAILY PRN
Status: DISCONTINUED | OUTPATIENT
Start: 2025-02-09 | End: 2025-02-10 | Stop reason: HOSPADM

## 2025-02-09 RX ORDER — ENOXAPARIN SODIUM 100 MG/ML
30 INJECTION SUBCUTANEOUS 2 TIMES DAILY
Status: DISCONTINUED | OUTPATIENT
Start: 2025-02-10 | End: 2025-02-09

## 2025-02-09 RX ORDER — MECLIZINE HCL 12.5 MG 12.5 MG/1
25 TABLET ORAL ONCE
Status: COMPLETED | OUTPATIENT
Start: 2025-02-09 | End: 2025-02-09

## 2025-02-09 RX ORDER — GLUCAGON 1 MG/ML
1 KIT INJECTION PRN
Status: DISCONTINUED | OUTPATIENT
Start: 2025-02-09 | End: 2025-02-10 | Stop reason: HOSPADM

## 2025-02-09 RX ORDER — LORAZEPAM 2 MG/ML
1 INJECTION INTRAMUSCULAR ONCE
Status: COMPLETED | OUTPATIENT
Start: 2025-02-09 | End: 2025-02-09

## 2025-02-09 RX ORDER — ASPIRIN 300 MG/1
300 SUPPOSITORY RECTAL DAILY
Status: DISCONTINUED | OUTPATIENT
Start: 2025-02-09 | End: 2025-02-10 | Stop reason: HOSPADM

## 2025-02-09 RX ORDER — SODIUM CHLORIDE 9 MG/ML
INJECTION, SOLUTION INTRAVENOUS PRN
Status: DISCONTINUED | OUTPATIENT
Start: 2025-02-09 | End: 2025-02-10 | Stop reason: HOSPADM

## 2025-02-09 RX ORDER — SODIUM CHLORIDE 0.9 % (FLUSH) 0.9 %
5-40 SYRINGE (ML) INJECTION EVERY 12 HOURS SCHEDULED
Status: DISCONTINUED | OUTPATIENT
Start: 2025-02-09 | End: 2025-02-10 | Stop reason: HOSPADM

## 2025-02-09 RX ORDER — INSULIN LISPRO 100 [IU]/ML
0-8 INJECTION, SOLUTION INTRAVENOUS; SUBCUTANEOUS
Status: DISCONTINUED | OUTPATIENT
Start: 2025-02-09 | End: 2025-02-10 | Stop reason: HOSPADM

## 2025-02-09 RX ORDER — ONDANSETRON 2 MG/ML
4 INJECTION INTRAMUSCULAR; INTRAVENOUS EVERY 6 HOURS PRN
Status: DISCONTINUED | OUTPATIENT
Start: 2025-02-09 | End: 2025-02-10 | Stop reason: HOSPADM

## 2025-02-09 RX ORDER — SODIUM CHLORIDE 0.9 % (FLUSH) 0.9 %
5-40 SYRINGE (ML) INJECTION PRN
Status: DISCONTINUED | OUTPATIENT
Start: 2025-02-09 | End: 2025-02-10 | Stop reason: HOSPADM

## 2025-02-09 RX ADMIN — ATORVASTATIN CALCIUM 80 MG: 40 TABLET, FILM COATED ORAL at 20:01

## 2025-02-09 RX ADMIN — ASPIRIN 81 MG: 81 TABLET, CHEWABLE ORAL at 20:01

## 2025-02-09 RX ADMIN — APIXABAN 5 MG: 5 TABLET, FILM COATED ORAL at 21:16

## 2025-02-09 RX ADMIN — MECLIZINE HYDROCHLORIDE 25 MG: 12.5 TABLET ORAL at 13:51

## 2025-02-09 RX ADMIN — SODIUM CHLORIDE, PRESERVATIVE FREE 10 ML: 5 INJECTION INTRAVENOUS at 21:16

## 2025-02-09 RX ADMIN — LORAZEPAM 0.5 MG: 2 INJECTION INTRAMUSCULAR; INTRAVENOUS at 16:56

## 2025-02-09 RX ADMIN — LORAZEPAM 1 MG: 2 INJECTION INTRAMUSCULAR; INTRAVENOUS at 15:09

## 2025-02-09 RX ADMIN — IOPAMIDOL 75 ML: 755 INJECTION, SOLUTION INTRAVENOUS at 14:04

## 2025-02-09 ASSESSMENT — PAIN SCALES - GENERAL
PAINLEVEL_OUTOF10: 0
PAINLEVEL_OUTOF10: 0

## 2025-02-09 ASSESSMENT — PAIN - FUNCTIONAL ASSESSMENT: PAIN_FUNCTIONAL_ASSESSMENT: NONE - DENIES PAIN

## 2025-02-09 NOTE — ED PROVIDER NOTES
Hematocrit 42.4 42.0 - 52.0 %    MCV 94.2 78.0 - 100.0 fL    MCH 29.6 27.0 - 31.0 pg    MCHC 31.4 (L) 32.0 - 36.0 g/dL    RDW 13.6 11.7 - 14.9 %    MPV 11.0 7.5 - 11.1 fL    Platelet, Fluorescence 132 (L) 140 - 440 k/uL   Hemoglobin A1c   Result Value Ref Range    Hemoglobin A1C 5.9 4.2 - 6.3 %    Estimated Avg Glucose 122 mg/dL   Lipid Panel   Result Value Ref Range    Cholesterol, Total 112 (L) 125 - 199 mg/dL    HDL 38 (L) >40 mg/dL    LDL Cholesterol 51 <101 mg/dL    Triglycerides 116 <150 mg/dL   Vitamin B12 & Folate   Result Value Ref Range    Vitamin B-12 599 211 - 911 pg/mL    Folate 11.2 4.8 - 24.2 ng/mL   POCT Glucose   Result Value Ref Range    Glucose 125 mg/dL   POCT Glucose   Result Value Ref Range    POC Glucose 125 (H) 74 - 99 mg/dL   POCT Glucose   Result Value Ref Range    POC Glucose 92 74 - 99 mg/dL   POCT Glucose   Result Value Ref Range    POC Glucose 102 (H) 74 - 99 mg/dL   POCT Glucose   Result Value Ref Range    POC Glucose 99 74 - 99 mg/dL   EKG 12 Lead - Recommend after Head CT performed   Result Value Ref Range    Ventricular Rate 48 BPM    Atrial Rate 48 BPM    P-R Interval 152 ms    QRS Duration 90 ms    Q-T Interval 454 ms    QTc Calculation (Bazett) 405 ms    P Axis -9 degrees    R Axis -21 degrees    T Axis 95 degrees    Diagnosis       Sinus bradycardia  Moderate voltage criteria for LVH, may be normal variant  Cannot rule out Septal infarct , age undetermined  Abnormal ECG  When compared with ECG of 14-JAN-2020 09:56,  Minimal criteria for Septal infarct are now present  T wave amplitude has decreased in Inferior leads  T wave inversion now evident in Anterior leads  Confirmed by DUY SALAZAR MD ([id number]) on 2/9/2025 3:29:39 PM          Radiographs (if obtained):  Radiologist's Report Reviewed:      Independent Imaging Interpretation by me: no intracranial hemorrhage    Ordered and reviewed diagnostic studies     ED Course as of 02/11/25 2127   Sun Feb 09, 2025   1327 Pulse(!): 49

## 2025-02-09 NOTE — H&P
visualized sinuses are clear. IMPRESSION: No evidence of acute intracranial pathology. This dictation was created with voice recognition software. Although every effort is made to review the dictation as it is transcribed, on occasion the spoken word can be misinterpreted by the technology leading to omissions or inappropriate words, phrases or sentences.  Dictated and Electronically Signed By: Kashif Stapleton DO 2/9/2025 14:29          Personally reviewed Lab Studies, Imaging, and discussed case with pt and family    Electronically signed by Vin Jackson MD on 2/9/2025 at 8:18 PM

## 2025-02-10 ENCOUNTER — APPOINTMENT (OUTPATIENT)
Dept: MRI IMAGING | Age: 65
End: 2025-02-10
Payer: COMMERCIAL

## 2025-02-10 VITALS
OXYGEN SATURATION: 95 % | SYSTOLIC BLOOD PRESSURE: 123 MMHG | RESPIRATION RATE: 15 BRPM | WEIGHT: 291.67 LBS | BODY MASS INDEX: 38.66 KG/M2 | HEIGHT: 73 IN | HEART RATE: 46 BPM | DIASTOLIC BLOOD PRESSURE: 66 MMHG | TEMPERATURE: 97.6 F

## 2025-02-10 LAB
ANION GAP SERPL CALCULATED.3IONS-SCNC: 8 MMOL/L (ref 9–17)
BUN SERPL-MCNC: 16 MG/DL (ref 7–20)
CALCIUM SERPL-MCNC: 8.5 MG/DL (ref 8.3–10.6)
CHLORIDE SERPL-SCNC: 108 MMOL/L (ref 99–110)
CHOLEST SERPL-MCNC: 112 MG/DL (ref 125–199)
CO2 SERPL-SCNC: 26 MMOL/L (ref 21–32)
CREAT SERPL-MCNC: 1.1 MG/DL (ref 0.8–1.3)
ERYTHROCYTE [DISTWIDTH] IN BLOOD BY AUTOMATED COUNT: 13.6 % (ref 11.7–14.9)
EST. AVERAGE GLUCOSE BLD GHB EST-MCNC: 122 MG/DL
FOLATE SERPL-MCNC: 11.2 NG/ML (ref 4.8–24.2)
GFR, ESTIMATED: 72 ML/MIN/1.73M2
GLUCOSE BLD-MCNC: 102 MG/DL (ref 74–99)
GLUCOSE BLD-MCNC: 99 MG/DL (ref 74–99)
GLUCOSE SERPL-MCNC: 145 MG/DL (ref 74–99)
HBA1C MFR BLD: 5.9 % (ref 4.2–6.3)
HCT VFR BLD AUTO: 42.4 % (ref 42–52)
HDLC SERPL-MCNC: 38 MG/DL
HGB BLD-MCNC: 13.3 G/DL (ref 13.5–18)
LDLC SERPL CALC-MCNC: 51 MG/DL
MCH RBC QN AUTO: 29.6 PG (ref 27–31)
MCHC RBC AUTO-ENTMCNC: 31.4 G/DL (ref 32–36)
MCV RBC AUTO: 94.2 FL (ref 78–100)
PLATELET, FLUORESCENCE: 132 K/UL (ref 140–440)
PMV BLD AUTO: 11 FL (ref 7.5–11.1)
POTASSIUM SERPL-SCNC: 4.2 MMOL/L (ref 3.5–5.1)
RBC # BLD AUTO: 4.5 M/UL (ref 4.6–6.2)
SODIUM SERPL-SCNC: 142 MMOL/L (ref 136–145)
TRIGL SERPL-MCNC: 116 MG/DL
VIT B12 SERPL-MCNC: 599 PG/ML (ref 211–911)
WBC OTHER # BLD: 5.3 K/UL (ref 4–10.5)

## 2025-02-10 PROCEDURE — 83036 HEMOGLOBIN GLYCOSYLATED A1C: CPT

## 2025-02-10 PROCEDURE — 6370000000 HC RX 637 (ALT 250 FOR IP): Performed by: STUDENT IN AN ORGANIZED HEALTH CARE EDUCATION/TRAINING PROGRAM

## 2025-02-10 PROCEDURE — 80048 BASIC METABOLIC PNL TOTAL CA: CPT

## 2025-02-10 PROCEDURE — 6370000000 HC RX 637 (ALT 250 FOR IP): Performed by: NURSE PRACTITIONER

## 2025-02-10 PROCEDURE — 2500000003 HC RX 250 WO HCPCS: Performed by: STUDENT IN AN ORGANIZED HEALTH CARE EDUCATION/TRAINING PROGRAM

## 2025-02-10 PROCEDURE — 99223 1ST HOSP IP/OBS HIGH 75: CPT | Performed by: STUDENT IN AN ORGANIZED HEALTH CARE EDUCATION/TRAINING PROGRAM

## 2025-02-10 PROCEDURE — 82962 GLUCOSE BLOOD TEST: CPT

## 2025-02-10 PROCEDURE — G0378 HOSPITAL OBSERVATION PER HR: HCPCS

## 2025-02-10 PROCEDURE — 36415 COLL VENOUS BLD VENIPUNCTURE: CPT

## 2025-02-10 PROCEDURE — 82607 VITAMIN B-12: CPT

## 2025-02-10 PROCEDURE — 82746 ASSAY OF FOLIC ACID SERUM: CPT

## 2025-02-10 PROCEDURE — 80061 LIPID PANEL: CPT

## 2025-02-10 PROCEDURE — 85027 COMPLETE CBC AUTOMATED: CPT

## 2025-02-10 PROCEDURE — 70551 MRI BRAIN STEM W/O DYE: CPT

## 2025-02-10 PROCEDURE — 94761 N-INVAS EAR/PLS OXIMETRY MLT: CPT

## 2025-02-10 RX ORDER — MECLIZINE HYDROCHLORIDE 25 MG/1
25 TABLET ORAL EVERY 8 HOURS
Qty: 30 TABLET | Refills: 0 | Status: SHIPPED | OUTPATIENT
Start: 2025-02-10 | End: 2025-02-20

## 2025-02-10 RX ORDER — MECLIZINE HYDROCHLORIDE 25 MG/1
25 TABLET ORAL EVERY 8 HOURS
Status: DISCONTINUED | OUTPATIENT
Start: 2025-02-10 | End: 2025-02-10 | Stop reason: HOSPADM

## 2025-02-10 RX ADMIN — ASPIRIN 81 MG: 81 TABLET, CHEWABLE ORAL at 07:45

## 2025-02-10 RX ADMIN — MECLIZINE HYDROCHLORIDE 25 MG: 25 TABLET ORAL at 10:13

## 2025-02-10 RX ADMIN — APIXABAN 5 MG: 5 TABLET, FILM COATED ORAL at 07:45

## 2025-02-10 RX ADMIN — SODIUM CHLORIDE, PRESERVATIVE FREE 10 ML: 5 INJECTION INTRAVENOUS at 07:46

## 2025-02-10 NOTE — PROGRESS NOTES
4 Eyes Skin Assessment     NAME:  Herminio Peralta  YOB: 1960  MEDICAL RECORD NUMBER:  [id number]    The patient is being assessed for  Admission    I agree that at least one RN has performed a thorough Head to Toe Skin Assessment on the patient. ALL assessment sites listed below have been assessed.      Areas assessed by both nurses:    Head, Face, Ears, Shoulders, Back, Chest, Arms, Elbows, Hands, Sacrum. Buttock, Coccyx, Ischium, Legs. Feet and Heels, and Under Medical Devices         Does the Patient have a Wound? No noted wound(s)       Scot Prevention initiated by RN: No  Wound Care Orders initiated by RN: No    Pressure Injury (Stage 3,4, Unstageable, DTI, NWPT, and Complex wounds) if present, place Wound referral order by RN under : No    New Ostomies, if present place, Ostomy referral order under : No     Nurse 1 eSignature: Electronically signed by Wandy Benton RN on 2/9/25 at 7:09 PM EST    **SHARE this note so that the co-signing nurse can place an eSignature**    Nurse 2 eSignature: Electronically signed by Gloria Quan RN on 2/10/25 at 1:36 AM EST

## 2025-02-10 NOTE — DISCHARGE SUMMARY
V2.0  Discharge Summary    Name:  Herminio Peralta /Age/Sex: 1960 (64 y.o. male)   Admit Date: 2025  Discharge Date: 2/10/25    MRN & CSN:  [id number] & [id number] Encounter Date and Time 2/10/25 2:59 PM EST    Attending:  Tc Arriaga,* Discharging Provider: Tc Owens MD       Hospital Course:     Brief HPI: Herminio Peralta is a 64 y.o. male who presented with dizziness started middle of the night. Pt reports balance issues wit walking. He reports feeling more dizziness with moving his head. Last well known 1AM day of admission. Describes dizziness as room spinning and lightheadedness associated with nausea. No double vision. No difficulty speaking, no weakness or numbness. Pt's HR has been in the 40-50's at home.      At [facility] ED, pt was afebrile, hemodynamically stable; on room air. Labs unremarkable. CT head no acute hemorrhage. CTA head/neck no LVO. No stroke code called. Discussed with ED, will admit pt to obs  for stroke like symptoms  workup and management.     Brief Problem Based Course:       Patient presented with Dizziness. Brain MRI came back negative for Stroke. Hold coreg for now. EKG showed sinus [place]. He follows up with Cardiology and had appointment today. Recommended to follow up as OP. Will DC on meclezine PRN and provide referral for  vestibular therapy.     Dizziness peripheral vs central etiology - Stroke ruled out  P/w dizziness, ataxia started overnight day of admission  NIHSS 0. Last well known 1AM day of admission  No focal neurological deficits.   Need to rule out stroke. But could be BPPV vs vestibular neuritis  horizontal nystagmus noted on exam  - neuro checks  - monitor on tele  - resume home ASA, Eliquis  - MRI brain WO contrast  - meclizine as needed  - monitor on tele  - check HA1C, lipid panel     Essential HTN  Await home med recs. Resume home meds tomorrow     Atrial fibrillation. Started on Eliquis recently. Has been on Coreg 3.125

## 2025-02-10 NOTE — PLAN OF CARE
Problem: Discharge Planning  Goal: Discharge to home or other facility with appropriate resources  2/9/2025 2310 by Gloria Quan, RN  Outcome: Progressing  2/9/2025 1828 by Wandy Benton, RN  Outcome: Progressing     Problem: Pain  Goal: Verbalizes/displays adequate comfort level or baseline comfort level  2/9/2025 2310 by Gloria Quan, RN  Outcome: Progressing  2/9/2025 1828 by Wandy Benton, RN  Outcome: Progressing

## 2025-02-10 NOTE — CONSULTS
Patient presenting with peripheral vertigo in the setting of acute otitis media.  MRI head is nonacute.  Neurologic exam is nonfocal and nonlateralizing.  He reports feeling \"50% better\" with meclizine.  Recommend outpatient vestibular therapy if treatment of otitis media does not resolve the remainder of his symptoms.  No further inpatient neurodiagnostics indicated at this time.  We will sign off at this time.  Thank you for allowing us to participate in the care of your patient.    Greater than 80 minutes total were spent on this patient's case including chart review, reviewing prior and recent imaging, obtaining history, examining the patient, and and documentation.    Ruchi Castro DO 2/10/2025 5:20 PM

## 2025-02-12 ENCOUNTER — APPOINTMENT (OUTPATIENT)
Dept: GENERAL RADIOLOGY | Age: 65
End: 2025-02-12
Payer: COMMERCIAL

## 2025-02-12 ENCOUNTER — HOSPITAL ENCOUNTER (EMERGENCY)
Age: 65
Discharge: HOME | End: 2025-02-12
Attending: EMERGENCY MEDICINE
Payer: COMMERCIAL

## 2025-02-12 VITALS
OXYGEN SATURATION: 98 % | HEIGHT: 74 IN | BODY MASS INDEX: 37.47 KG/M2 | WEIGHT: 292 LBS | RESPIRATION RATE: 18 BRPM | DIASTOLIC BLOOD PRESSURE: 68 MMHG | HEART RATE: 61 BPM | SYSTOLIC BLOOD PRESSURE: 142 MMHG | TEMPERATURE: 97.8 F

## 2025-02-12 DIAGNOSIS — R05.9 COUGH, UNSPECIFIED TYPE: ICD-10-CM

## 2025-02-12 DIAGNOSIS — H92.09 OTALGIA, UNSPECIFIED LATERALITY: ICD-10-CM

## 2025-02-12 DIAGNOSIS — J02.9 ACUTE PHARYNGITIS, UNSPECIFIED ETIOLOGY: ICD-10-CM

## 2025-02-12 DIAGNOSIS — J11.1 INFLUENZA: Primary | ICD-10-CM

## 2025-02-12 LAB
INFLUENZA A BY PCR: DETECTED
INFLUENZA B BY PCR: NOT DETECTED
S PYO AG THROAT QL: NEGATIVE
SARS-COV-2 RDRP RESP QL NAA+PROBE: NOT DETECTED
SPECIMEN DESCRIPTION: NORMAL
SPECIMEN SOURCE: NORMAL

## 2025-02-12 PROCEDURE — 99284 EMERGENCY DEPT VISIT MOD MDM: CPT

## 2025-02-12 PROCEDURE — 6370000000 HC RX 637 (ALT 250 FOR IP): Performed by: EMERGENCY MEDICINE

## 2025-02-12 PROCEDURE — 71045 X-RAY EXAM CHEST 1 VIEW: CPT

## 2025-02-12 PROCEDURE — 87880 STREP A ASSAY W/OPTIC: CPT

## 2025-02-12 PROCEDURE — 87502 INFLUENZA DNA AMP PROBE: CPT

## 2025-02-12 PROCEDURE — 2500000003 HC RX 250 WO HCPCS: Performed by: EMERGENCY MEDICINE

## 2025-02-12 PROCEDURE — 87635 SARS-COV-2 COVID-19 AMP PRB: CPT

## 2025-02-12 PROCEDURE — 87081 CULTURE SCREEN ONLY: CPT

## 2025-02-12 RX ORDER — ACETAMINOPHEN 500 MG
500 TABLET ORAL 4 TIMES DAILY PRN
Qty: 360 TABLET | Refills: 1 | Status: SHIPPED | OUTPATIENT
Start: 2025-02-12

## 2025-02-12 RX ORDER — GUAIFENESIN/DEXTROMETHORPHAN 100-10MG/5
5 SYRUP ORAL 4 TIMES DAILY PRN
Qty: 120 ML | Refills: 0 | Status: SHIPPED | OUTPATIENT
Start: 2025-02-12 | End: 2025-02-22

## 2025-02-12 RX ORDER — BENZONATATE 100 MG/1
100 CAPSULE ORAL 3 TIMES DAILY PRN
Qty: 10 CAPSULE | Refills: 0 | Status: SHIPPED | OUTPATIENT
Start: 2025-02-12 | End: 2025-02-19

## 2025-02-12 RX ORDER — NAPROXEN 250 MG/1
500 TABLET ORAL ONCE
Status: COMPLETED | OUTPATIENT
Start: 2025-02-12 | End: 2025-02-12

## 2025-02-12 RX ORDER — BENZONATATE 100 MG/1
100 CAPSULE ORAL ONCE
Status: COMPLETED | OUTPATIENT
Start: 2025-02-12 | End: 2025-02-12

## 2025-02-12 RX ORDER — OSELTAMIVIR PHOSPHATE 75 MG/1
75 CAPSULE ORAL 2 TIMES DAILY
Qty: 10 CAPSULE | Refills: 0 | Status: SHIPPED | OUTPATIENT
Start: 2025-02-12 | End: 2025-02-17

## 2025-02-12 RX ORDER — NAPROXEN 500 MG/1
500 TABLET ORAL 2 TIMES DAILY
Qty: 60 TABLET | Refills: 0 | Status: SHIPPED | OUTPATIENT
Start: 2025-02-12

## 2025-02-12 RX ORDER — GUAIFENESIN 200 MG/10ML
200 LIQUID ORAL ONCE
Status: COMPLETED | OUTPATIENT
Start: 2025-02-12 | End: 2025-02-12

## 2025-02-12 RX ADMIN — NAPROXEN 500 MG: 250 TABLET ORAL at 08:43

## 2025-02-12 RX ADMIN — GUAIFENESIN 200 MG: 200 SOLUTION ORAL at 08:43

## 2025-02-12 RX ADMIN — BENZONATATE 100 MG: 100 CAPSULE ORAL at 08:43

## 2025-02-12 ASSESSMENT — PAIN DESCRIPTION - DESCRIPTORS: DESCRIPTORS: ACHING

## 2025-02-12 ASSESSMENT — PAIN - FUNCTIONAL ASSESSMENT
PAIN_FUNCTIONAL_ASSESSMENT: 0-10
PAIN_FUNCTIONAL_ASSESSMENT: ACTIVITIES ARE NOT PREVENTED

## 2025-02-12 ASSESSMENT — ENCOUNTER SYMPTOMS
RHINORRHEA: 1
COUGH: 1
SORE THROAT: 1
EYES NEGATIVE: 1
GASTROINTESTINAL NEGATIVE: 1
ALLERGIC/IMMUNOLOGIC NEGATIVE: 1

## 2025-02-12 ASSESSMENT — PAIN DESCRIPTION - LOCATION: LOCATION: EAR;THROAT

## 2025-02-12 ASSESSMENT — PAIN SCALES - GENERAL: PAINLEVEL_OUTOF10: 9

## 2025-02-12 ASSESSMENT — PAIN DESCRIPTION - ORIENTATION: ORIENTATION: RIGHT

## 2025-02-12 NOTE — ED PROVIDER NOTES
precautions and follow-up information, discharge    CLINICAL IMPRESSION:  Final diagnoses:   Cough, unspecified type   Acute pharyngitis, unspecified etiology   Otalgia, unspecified laterality   Influenza       (Please note that portions of this note may have been completed with a voice recognition program. Efforts were made to edit the dictations but occasionally words aremis-transcribed.)    DISPOSITION REFERRAL (if applicable):  Jerrica Patterson, DO  [address]   [facility] [address]  [phone]    Schedule an appointment as soon as possible for a visit in 1 day      [facility] Emergency Department  [address] [facility] [address]  [phone]    If symptoms worsen      DISPOSITION MEDICATIONS (if applicable):  New Prescriptions    ACETAMINOPHEN (TYLENOL) 500 MG TABLET    Take 1 tablet by mouth 4 times daily as needed for Pain    BENZONATATE (TESSALON PERLES) 100 MG CAPSULE    Take 1 capsule by mouth 3 times daily as needed for Cough    GUAIFENESIN-DEXTROMETHORPHAN (ROBITUSSIN DM) 100-10 MG/5ML SYRUP    Take 5 mLs by mouth 4 times daily as needed for Cough    NAPROXEN (NAPROSYN) 500 MG TABLET    Take 1 tablet by mouth 2 times daily    OSELTAMIVIR (TAMIFLU) 75 MG CAPSULE    Take 1 capsule by mouth 2 times daily for 5 days          DO Maria Teresa Dawson James, DO  02/12/25 0966

## 2025-02-15 LAB
MICROORGANISM SPEC CULT: NORMAL
SPECIMEN DESCRIPTION: NORMAL

## 2025-04-14 ENCOUNTER — AMBULATORY SURGICAL CENTER (OUTPATIENT)
Dept: URBAN - METROPOLITAN AREA SURGERY 5 | Facility: SURGERY | Age: 65
End: 2025-04-14
Payer: COMMERCIAL

## 2025-04-14 VITALS
DIASTOLIC BLOOD PRESSURE: 76 MMHG | HEART RATE: 67 BPM | OXYGEN SATURATION: 84 % | HEART RATE: 53 BPM | DIASTOLIC BLOOD PRESSURE: 84 MMHG | HEART RATE: 58 BPM | SYSTOLIC BLOOD PRESSURE: 109 MMHG | SYSTOLIC BLOOD PRESSURE: 144 MMHG | DIASTOLIC BLOOD PRESSURE: 73 MMHG | SYSTOLIC BLOOD PRESSURE: 125 MMHG | RESPIRATION RATE: 13 BRPM | DIASTOLIC BLOOD PRESSURE: 69 MMHG | OXYGEN SATURATION: 98 % | SYSTOLIC BLOOD PRESSURE: 119 MMHG | OXYGEN SATURATION: 99 % | SYSTOLIC BLOOD PRESSURE: 133 MMHG | RESPIRATION RATE: 16 BRPM | HEART RATE: 59 BPM | OXYGEN SATURATION: 96 % | RESPIRATION RATE: 12 BRPM | HEART RATE: 57 BPM | HEIGHT: 74 IN | DIASTOLIC BLOOD PRESSURE: 90 MMHG | RESPIRATION RATE: 15 BRPM | HEART RATE: 51 BPM | SYSTOLIC BLOOD PRESSURE: 126 MMHG | HEART RATE: 60 BPM | WEIGHT: 290 LBS | HEART RATE: 62 BPM | DIASTOLIC BLOOD PRESSURE: 75 MMHG | SYSTOLIC BLOOD PRESSURE: 117 MMHG | DIASTOLIC BLOOD PRESSURE: 71 MMHG | SYSTOLIC BLOOD PRESSURE: 146 MMHG | DIASTOLIC BLOOD PRESSURE: 55 MMHG | DIASTOLIC BLOOD PRESSURE: 67 MMHG | SYSTOLIC BLOOD PRESSURE: 118 MMHG | OXYGEN SATURATION: 97 % | OXYGEN SATURATION: 100 % | TEMPERATURE: 97.2 F

## 2025-04-14 DIAGNOSIS — K57.30 DIVERTICULOSIS OF LARGE INTESTINE WITHOUT PERFORATION OR ABS: ICD-10-CM

## 2025-04-14 DIAGNOSIS — Z09 ENCOUNTER FOR FOLLOW-UP EXAMINATION AFTER COMPLETED TREATMEN: ICD-10-CM

## 2025-04-14 DIAGNOSIS — K63.5 POLYP OF COLON: ICD-10-CM

## 2025-04-14 DIAGNOSIS — Z86.0101 PERSONAL HISTORY OF ADENOMATOUS AND SERRATED COLON POLYPS: ICD-10-CM

## 2025-04-14 PROBLEM — Z86.010 SURVEILLANCE DUE TO PRIOR COLONIC NEOPLASIA: Status: ACTIVE | Noted: 2025-04-14

## 2025-04-14 PROCEDURE — 45385 COLONOSCOPY W/LESION REMOVAL: CPT | Performed by: INTERNAL MEDICINE

## 2025-04-18 LAB — PDF: PDF REPORT: (no result)

## (undated) DEVICE — MYO/WIRE® ULTRA-THIN BIPOLAR INLINE - 60CM - BLUE - FLARE 8MM ELECTRODE: Brand: MYO/WIRE® ULTRA-THIN TEMPORARY PACING WIRES

## (undated) DEVICE — SOLUTION IV IRRIG POUR BRL 0.9% SODIUM CHL 2F7124

## (undated) DEVICE — COR-KNOT MINI® COMBO KITBASE PACKAGE TYPE - KITEACH STERILE PACKAGE KIT CONTAINS (2) SINGLE PATIENT USE COR-KNOT MINI® DEVICES AND (12) COR-KNOT® QUICK LOADS®.: Brand: COR-KNOT MINI®

## (undated) DEVICE — ADAPTER PERF L7.5IN INLET LEG 3IN Y TYP VENT CLR CODE CLMP

## (undated) DEVICE — CANNULA PERF 12GA L12.25IN GRY S STL AORT ROOT W/ LUER CONN

## (undated) DEVICE — GOWN,SIRUS,FABRNF,RAGLAN,L,ST,30/CS: Brand: MEDLINE

## (undated) DEVICE — DRAPE SHEET ULTRAGARD: Brand: MEDLINE

## (undated) DEVICE — SUTURE NONABSORBABLE MONOFILAMENT 4-0 RB-1 36 IN BLU PROLENE 8557H

## (undated) DEVICE — MARKER SURG SKIN UTIL REGULAR/FINE 2 TIP W/ RUL AND 9 LBL

## (undated) DEVICE — CANNULA PERF 20FR L25CM CONN 3 8IN ART HI FLOW NVENT CRV TIP

## (undated) DEVICE — TUBING, SUCTION, 9/32" X 10', STRAIGHT: Brand: MEDLINE

## (undated) DEVICE — BLADE CLIPPER GEN PURP NS

## (undated) DEVICE — LINER,SEMI-RIGID,3000CC,50EA/CS: Brand: MEDLINE

## (undated) DEVICE — LOOP VES W25MM THK1MM MAXI RED SIL FLD REPELLENT 100 PER

## (undated) DEVICE — SET ADMIN 25ML L117IN PMP MOD CK VLV RLER CLMP 2 SMRTSITE

## (undated) DEVICE — SPONGE LAP W18XL18IN WHT COT 4 PLY FLD STRUNG RADPQ DISP ST

## (undated) DEVICE — Z INACTIVE USE 2641837 CLIP LIG M BLU TI HRT SHP WIRE HORZ 600 PER BX

## (undated) DEVICE — Device

## (undated) DEVICE — CANNULA PERF L5.5IN DIA9FR AORT ROOT AG STD TIP W/ VENT LN

## (undated) DEVICE — INTENDED FOR TISSUE SEPARATION, AND OTHER PROCEDURES THAT REQUIRE A SHARP SURGICAL BLADE TO PUNCTURE OR CUT.: Brand: BARD-PARKER ® STAINLESS STEEL BLADES

## (undated) DEVICE — ROTATING SURGICAL PUNCHES, 1 PER POUCH: Brand: A&E MEDICAL / ROTATING SURGICAL PUNCHES

## (undated) DEVICE — OPEN HEART PACK: Brand: MEDLINE INDUSTRIES, INC.

## (undated) DEVICE — BAG AUTOTRNS 600ML BLD SGL CHMBR 3 PRT PLAS DEHP PVC

## (undated) DEVICE — TOTAL TRAY, DB, 100% SILI FOLEY, 16FR 10: Brand: MEDLINE

## (undated) DEVICE — SET ADMIN L105IN 10GTT 3 NDL FREE CK VLV 2 PC M LUERLOCK

## (undated) DEVICE — KIT DIL 8/12/16/20/24FR NDL 18GA GWIRE L180CM DIA0.035IN

## (undated) DEVICE — DECANTER FLD 9IN ST BG FOR ASEP TRNSF OF FLD

## (undated) DEVICE — KIT PLT CONC DISP SGL W/ ACDA GPS II

## (undated) DEVICE — GLOVE ORANGE PI 8   MSG9080

## (undated) DEVICE — Z DUP USE 2257490 ADHESIVE SKIN CLSRE 036ML TPCL 2CTL CNCRLTE HIGH VSCSTY DRMB

## (undated) DEVICE — MPS® DELIVERY SET W/ARREST AGENT AND ADDITIVE CASSETTES, HEAT EXCHANGER & 10 FT. DELIVERY TUBING: Brand: MPS

## (undated) DEVICE — DRAPE,UTILITY,XL,4/PK,STERILE: Brand: MEDLINE

## (undated) DEVICE — BLADE SAW W10XL54MM FOR PRI REPEAT STRNOTMY

## (undated) DEVICE — DRAIN SURG L3/8-1/2IN DIA3/16IN SIL CARD CONN 1:1 BLAK

## (undated) DEVICE — DRAIN,WOUND,ROUND,24FR,5/16",FULL-FLUTED: Brand: MEDLINE

## (undated) DEVICE — PERCUTANEOUS INSERTION KIT-VENOUS: Brand: PERCUTANEOUS INSERTION KIT-VENOUS

## (undated) DEVICE — GLOVE ORANGE PI 7   MSG9070

## (undated) DEVICE — SUTURE NRLN SZ 1 L18IN NONABSORBABLE BLK L36MM CT-1 1/2 CIR C520D

## (undated) DEVICE — SENSOR PLSE OXMTR AD CBL L3FT ADH TRANSMISSIVE

## (undated) DEVICE — CONNECTOR IV 3/8X3/8X3/8 Y

## (undated) DEVICE — SURGICAL PROCEDURE PACK PERF TBNG

## (undated) DEVICE — COUNTER NDL 30 COUNT FOAM STRP SGL MAG

## (undated) DEVICE — 34" SINGLE PATIENT USE HOVERMATT BREATHABLE: Brand: SINGLE PATIENT USE HOVERMATT

## (undated) DEVICE — SUTURE PROL DBL ARMED 5-0 C-1 18IN N ABSRB MFIL M8717

## (undated) DEVICE — GOWN,SIRUS,FABRNF,RAGLAN,XL,ST,28/CS: Brand: MEDLINE

## (undated) DEVICE — ELECTRODE ES AD CRDLSS PT RET REM POLYHESIVE

## (undated) DEVICE — PLEDGET VASC W3/16XL3/8IN THK1/16IN PTFE SFT

## (undated) DEVICE — SWAN-GANZ THERMODILUTION CATHETER: Brand: SWAN-GANZ

## (undated) DEVICE — SUTURE VCRL 2-0 L36IN ABSRB UD CTX L48MM 1/2 CIR TAPERPOINT J979H

## (undated) DEVICE — TAPE,CLOTH/SILK,CURAD,3"X10YD,LF,40/CS: Brand: CURAD

## (undated) DEVICE — CHLORAPREP 26ML ORANGE

## (undated) DEVICE — ELECTRODE ES L4IN PTFE INSUL BLDE W/ SFTY SL DISP EDGE

## (undated) DEVICE — TOWEL,OR,DSP,ST,BLUE,STD,6/PK,12PK/CS: Brand: MEDLINE

## (undated) DEVICE — WAX SURG 2.5GM HEMSTAT BNE BEESWAX PARAFFIN ISO PALMITATE

## (undated) DEVICE — FEM-FLEX II FEMORAL ARTERIAL CANNULA: Brand: FEM-FLEX II FEMORAL ARTERIAL CANNULA

## (undated) DEVICE — SPONGE,PEANUT,XRAY,ST,SM,3/8",5/CARD: Brand: MEDLINE INDUSTRIES, INC.

## (undated) DEVICE — COR-KNOT® QUICK LOAD® 6-POUCH: Brand: COR-KNOT® QUICK LOAD®

## (undated) DEVICE — GLOVE SURG SZ 65 L12IN FNGR THK87MIL WHT LTX FREE

## (undated) DEVICE — BLOOD MANAGEMENT SYSTEM.: Brand: VAMP

## (undated) DEVICE — DEVICE RESUS AD TB L40IN SELF INFL MASK TEXT BG DBL SWVL EL

## (undated) DEVICE — MPS® RETROGRADE EXTENSION PRESSURE LINE W/Y-SET: Brand: MPS

## (undated) DEVICE — MINI STICK MAX COAXIAL MICROINTRODUCER KIT: Brand: ANGIODYNAMICS

## (undated) DEVICE — SUTURE PROL 3-0 L36IN NONABSORBABLE BLU L26MM SH 1/2 CIR P8522

## (undated) DEVICE — SUTURE VCRL SZ 2 L27IN ABSRB UD L65MM TP-1 1/2 CIR J849G

## (undated) DEVICE — SUTURE SURGLON SZ 1 L30IN NONABSORBABLE BLK NO NDL NYL PRE 8886191971

## (undated) DEVICE — SUTURE S STL SZ 5 L18IN NONABSORBABLE SIL V-40 L48MM 1/2 M650G

## (undated) DEVICE — SUTURE ETHBND EXCEL SZ 2-0 L36IN NONABSORBABLE GRN L26MM SH X523H

## (undated) DEVICE — SUTURE NONABSORBABLE BRAIDED 2-0 SH-2 10X30 IN ETHBND EXCEL SXP82

## (undated) DEVICE — SET TRNQT W/ STD 7IN 12FR 5 TB 1 SNR DLP

## (undated) DEVICE — SUMP INTCARD SUCT AD 20FR PERICARD MAYO STYL FLX VERSATILE

## (undated) DEVICE — SUTURE S STL SZ 5 L18IN NONABSORBABLE SIL L48MM CCS 1/4 CIR M657G

## (undated) DEVICE — CANNULA PRFSN 12.5IN 15FR CS ADLT RTRGD

## (undated) DEVICE — 6 FOOT DISPOSABLE EXTENSION CABLE WITH SAFE CONNECT / SCREW-DOWN

## (undated) DEVICE — FOGARTY - HYDRAGRIP SURGICAL - CLAMP INSERTS: Brand: FOGARTY SOFTJAW

## (undated) DEVICE — CANNULA VENT 16FR MAL INTRO SIL CONN 0.25IN NVENT BULL TIP

## (undated) DEVICE — GLOVE SURG SZ 6 THK91MIL LTX FREE SYN POLYISOPRENE ANTI

## (undated) DEVICE — SET ADMIN PRIMING 67ML L105IN NVENT 180UM FLTR 3 RLER CLMP

## (undated) DEVICE — SET GRAV VENT NVENT CK VLV 3 NDL FREE PRT 10 GTT

## (undated) DEVICE — CANNULA PERF L72.5CM DIA7.7X8.3MM FEM VEN MINIMALLY

## (undated) DEVICE — SUTURE MCRYL SZ 3-0 L27IN ABSRB UD L24MM PS-1 3/8 CIR PRIM Y936H

## (undated) DEVICE — SENSOR OXMTR SM AD DISP FOR INVOS SYS

## (undated) DEVICE — TOWEL,OR,DSP,ST,WHITE,DLX,XR,4/PK,20PK/C: Brand: MEDLINE

## (undated) DEVICE — KIT INTRO 8.5FR L4IN PERC POLYUR SHTH RADPQ W/ INTEGR

## (undated) DEVICE — SUTURE NONABSORBABLE MONOFILAMENT 6-0 C-1 4X18 IN PROLENE M8718

## (undated) DEVICE — BLOOD TRANSFUSION FILTER: Brand: HAEMONETICS

## (undated) DEVICE — TOTAL TRAY, 16FR 10ML SIL FOLEY, URN: Brand: MEDLINE

## (undated) DEVICE — SUTURE VCRL SZ 4-0 L27IN ABSRB VLT L26MM SH 1/2 CIR J315H

## (undated) DEVICE — SUTURE VCRL SZ 4-0 L18IN ABSRB UD L19MM PS-2 3/8 CIR PRIM J496H

## (undated) DEVICE — KIT CVC AD 7FR L20CM POLYUR BLU FLEXTIP ANTIMIC MULTILUMEN

## (undated) DEVICE — DRESSING TRNSPAR W5XL4.5IN FLM SHT SEMIPERMEABLE WIND

## (undated) DEVICE — Z INACTIVE USE 2660664 SOLUTION IRRIG 3000ML 0.9% SOD CHL USP UROMATIC PLAS CONT

## (undated) DEVICE — CONTAINER,SPECIMEN,OR STERILE,4OZ: Brand: MEDLINE

## (undated) DEVICE — DRESSING TRNSPAR W2XL2.75IN FLM SHT SEMIPERMEABLE WIND

## (undated) DEVICE — SUTURE ETHIB EXCL X BR GRN V-7 DA 2-0 30 PX977 PX977H

## (undated) DEVICE — OSCILLATOR BLADE, 19.5 X 71 X 0.8 MM (.031 IN.): Brand: CONMED

## (undated) DEVICE — BLADE ES L2.75IN ELASTOMERIC COAT DURABLE BEND UPTO 90DEG

## (undated) DEVICE — SUTURE PROL SZ 4-0 L36IN NONABSORBABLE BLU L26MM SH 1/2 CIR 8521H

## (undated) DEVICE — CANNULA ART 20FR NVENT 3 8IN CONN EOPA 3D

## (undated) DEVICE — PRESSURE TUBING: Brand: TRUWAVE

## (undated) DEVICE — DRAIN SURG SGL COLL PT TB FOR ATS BG OASIS

## (undated) DEVICE — Z INACTIVE USE 2540311 LEAD PACE L475MM CHN A OR V MYOCARDIAL STEROID ELUT SIL

## (undated) DEVICE — BLANKET THER AD W24XL60IN FAB COVERING SUP SFT ULT THN LTWT

## (undated) DEVICE — MEDI-TRACE CADENCE ADULT, DEFIBRILLATION ELECTRODE -RTS  (10 PR/PK) - ZOLL: Brand: MEDI-TRACE CADENCE

## (undated) DEVICE — SET EXTN 4ML L32IN 4 W STPCOCK SPIN M LUERLOCK STD BOR

## (undated) DEVICE — YANKAUER,BULB TIP,W/O VENT,RIGID,STERILE: Brand: MEDLINE